# Patient Record
Sex: MALE | Race: WHITE | Employment: OTHER | URBAN - METROPOLITAN AREA
[De-identification: names, ages, dates, MRNs, and addresses within clinical notes are randomized per-mention and may not be internally consistent; named-entity substitution may affect disease eponyms.]

---

## 2017-10-31 PROBLEM — S72.415A: Status: ACTIVE | Noted: 2017-10-31

## 2017-10-31 PROBLEM — E87.1 HYPONATREMIA: Status: ACTIVE | Noted: 2017-10-31

## 2017-10-31 PROBLEM — E87.20 LACTIC ACIDOSIS: Status: ACTIVE | Noted: 2017-10-31

## 2017-10-31 PROBLEM — F10.10 ALCOHOL ABUSE: Status: ACTIVE | Noted: 2017-10-31

## 2017-11-01 PROBLEM — E44.0 MODERATE PROTEIN-CALORIE MALNUTRITION (HCC): Status: ACTIVE | Noted: 2017-11-01

## 2017-11-21 ENCOUNTER — OFFICE VISIT (OUTPATIENT)
Dept: ORTHOPEDIC SURGERY | Age: 67
End: 2017-11-21

## 2017-11-21 VITALS — BODY MASS INDEX: 24.39 KG/M2 | WEIGHT: 155.42 LBS | HEIGHT: 67 IN

## 2017-11-21 DIAGNOSIS — S72.415A: Primary | ICD-10-CM

## 2017-11-21 PROCEDURE — 1036F TOBACCO NON-USER: CPT | Performed by: ORTHOPAEDIC SURGERY

## 2017-11-21 PROCEDURE — 4040F PNEUMOC VAC/ADMIN/RCVD: CPT | Performed by: ORTHOPAEDIC SURGERY

## 2017-11-21 PROCEDURE — 1123F ACP DISCUSS/DSCN MKR DOCD: CPT | Performed by: ORTHOPAEDIC SURGERY

## 2017-11-21 PROCEDURE — G8420 CALC BMI NORM PARAMETERS: HCPCS | Performed by: ORTHOPAEDIC SURGERY

## 2017-11-21 PROCEDURE — G8484 FLU IMMUNIZE NO ADMIN: HCPCS | Performed by: ORTHOPAEDIC SURGERY

## 2017-11-21 PROCEDURE — G8427 DOCREV CUR MEDS BY ELIG CLIN: HCPCS | Performed by: ORTHOPAEDIC SURGERY

## 2017-11-21 PROCEDURE — 27516 TREAT THIGH FX GROWTH PLATE: CPT | Performed by: ORTHOPAEDIC SURGERY

## 2017-11-21 PROCEDURE — 1111F DSCHRG MED/CURRENT MED MERGE: CPT | Performed by: ORTHOPAEDIC SURGERY

## 2017-11-21 PROCEDURE — 3017F COLORECTAL CA SCREEN DOC REV: CPT | Performed by: ORTHOPAEDIC SURGERY

## 2017-11-21 PROCEDURE — 99203 OFFICE O/P NEW LOW 30 MIN: CPT | Performed by: ORTHOPAEDIC SURGERY

## 2017-11-21 NOTE — PROGRESS NOTES
left knee at that point. Consideration for full weightbearing and advancing activities then. All questions and concerns were addressed today. Patient is in agreement with the plan.         Nohemi Tsai MD  Hand & Upper Extremity Surgery  [address] [facility] [place] [facility] ([facility])

## 2017-12-12 ENCOUNTER — OFFICE VISIT (OUTPATIENT)
Dept: ORTHOPEDIC SURGERY | Age: 67
End: 2017-12-12

## 2017-12-12 VITALS — WEIGHT: 155.42 LBS | BODY MASS INDEX: 24.39 KG/M2 | HEIGHT: 67 IN

## 2017-12-12 DIAGNOSIS — S72.415A: Primary | ICD-10-CM

## 2017-12-12 PROCEDURE — 99024 POSTOP FOLLOW-UP VISIT: CPT | Performed by: ORTHOPAEDIC SURGERY

## 2021-07-20 ENCOUNTER — APPOINTMENT (OUTPATIENT)
Dept: GENERAL RADIOLOGY | Age: 71
DRG: 305 | End: 2021-07-20
Payer: MEDICARE

## 2021-07-20 ENCOUNTER — APPOINTMENT (OUTPATIENT)
Dept: CT IMAGING | Age: 71
DRG: 305 | End: 2021-07-20
Payer: MEDICARE

## 2021-07-20 ENCOUNTER — HOSPITAL ENCOUNTER (INPATIENT)
Age: 71
LOS: 6 days | Discharge: HOME | DRG: 305 | End: 2021-07-27
Attending: INTERNAL MEDICINE | Admitting: INTERNAL MEDICINE
Payer: MEDICARE

## 2021-07-20 DIAGNOSIS — I10 HYPERTENSION, UNSPECIFIED TYPE: ICD-10-CM

## 2021-07-20 DIAGNOSIS — R10.84 GENERALIZED ABDOMINAL CRAMPING: ICD-10-CM

## 2021-07-20 DIAGNOSIS — R55 SYNCOPE AND COLLAPSE: Primary | ICD-10-CM

## 2021-07-20 DIAGNOSIS — R11.2 NON-INTRACTABLE VOMITING WITH NAUSEA, UNSPECIFIED VOMITING TYPE: ICD-10-CM

## 2021-07-20 DIAGNOSIS — Z78.9 ALCOHOL USE: ICD-10-CM

## 2021-07-20 LAB
A/G RATIO: 1.5 (ref 1.1–2.2)
ALBUMIN SERPL-MCNC: 4.8 G/DL (ref 3.4–5)
ALP BLD-CCNC: 108 U/L (ref 40–129)
ALT SERPL-CCNC: 21 U/L (ref 10–40)
AMPHETAMINE SCREEN, URINE: NORMAL
ANION GAP SERPL CALCULATED.3IONS-SCNC: 16 MMOL/L (ref 3–16)
AST SERPL-CCNC: 36 U/L (ref 15–37)
BARBITURATE SCREEN URINE: NORMAL
BASOPHILS ABSOLUTE: 0.1 K/UL (ref 0–0.2)
BASOPHILS RELATIVE PERCENT: 0.7 %
BENZODIAZEPINE SCREEN, URINE: NORMAL
BILIRUB SERPL-MCNC: 0.5 MG/DL (ref 0–1)
BILIRUBIN URINE: NEGATIVE
BLOOD, URINE: NEGATIVE
BUN BLDV-MCNC: 10 MG/DL (ref 7–20)
CALCIUM SERPL-MCNC: 10.1 MG/DL (ref 8.3–10.6)
CANNABINOID SCREEN URINE: NORMAL
CHLORIDE BLD-SCNC: 92 MMOL/L (ref 99–110)
CLARITY: CLEAR
CO2: 26 MMOL/L (ref 21–32)
COCAINE METABOLITE SCREEN URINE: NORMAL
COLOR: YELLOW
CREAT SERPL-MCNC: 0.7 MG/DL (ref 0.8–1.3)
EOSINOPHILS ABSOLUTE: 0 K/UL (ref 0–0.6)
EOSINOPHILS RELATIVE PERCENT: 0.2 %
ETHANOL: NORMAL MG/DL (ref 0–0.08)
GFR AFRICAN AMERICAN: >60
GFR NON-AFRICAN AMERICAN: >60
GLOBULIN: 3.2 G/DL
GLUCOSE BLD-MCNC: 118 MG/DL (ref 70–99)
GLUCOSE URINE: NEGATIVE MG/DL
HCT VFR BLD CALC: 46.2 % (ref 40.5–52.5)
HEMOGLOBIN: 16.2 G/DL (ref 13.5–17.5)
KETONES, URINE: 15 MG/DL
LEUKOCYTE ESTERASE, URINE: NEGATIVE
LIPASE: 23 U/L (ref 13–60)
LYMPHOCYTES ABSOLUTE: 0.7 K/UL (ref 1–5.1)
LYMPHOCYTES RELATIVE PERCENT: 6.9 %
Lab: NORMAL
MCH RBC QN AUTO: 33.1 PG (ref 26–34)
MCHC RBC AUTO-ENTMCNC: 35.2 G/DL (ref 31–36)
MCV RBC AUTO: 94.1 FL (ref 80–100)
METHADONE SCREEN, URINE: NORMAL
MICROSCOPIC EXAMINATION: ABNORMAL
MONOCYTES ABSOLUTE: 0.4 K/UL (ref 0–1.3)
MONOCYTES RELATIVE PERCENT: 4.1 %
NEUTROPHILS ABSOLUTE: 9.2 K/UL (ref 1.7–7.7)
NEUTROPHILS RELATIVE PERCENT: 88.1 %
NITRITE, URINE: NEGATIVE
OPIATE SCREEN URINE: NORMAL
OXYCODONE URINE: NORMAL
PDW BLD-RTO: 13.4 % (ref 12.4–15.4)
PH UA: 7.5
PH UA: 7.5 (ref 5–8)
PHENCYCLIDINE SCREEN URINE: NORMAL
PLATELET # BLD: 244 K/UL (ref 135–450)
PMV BLD AUTO: 6.6 FL (ref 5–10.5)
POTASSIUM SERPL-SCNC: 4.2 MMOL/L (ref 3.5–5.1)
PROPOXYPHENE SCREEN: NORMAL
PROTEIN UA: NEGATIVE MG/DL
RBC # BLD: 4.91 M/UL (ref 4.2–5.9)
SODIUM BLD-SCNC: 134 MMOL/L (ref 136–145)
SPECIFIC GRAVITY UA: 1.01 (ref 1–1.03)
TOTAL PROTEIN: 8 G/DL (ref 6.4–8.2)
TROPONIN: <0.01 NG/ML
URINE REFLEX TO CULTURE: ABNORMAL
URINE TYPE: ABNORMAL
UROBILINOGEN, URINE: 0.2 E.U./DL
WBC # BLD: 10.5 K/UL (ref 4–11)

## 2021-07-20 PROCEDURE — 96375 TX/PRO/DX INJ NEW DRUG ADDON: CPT

## 2021-07-20 PROCEDURE — 93005 ELECTROCARDIOGRAM TRACING: CPT | Performed by: INTERNAL MEDICINE

## 2021-07-20 PROCEDURE — 82077 ASSAY SPEC XCP UR&BREATH IA: CPT

## 2021-07-20 PROCEDURE — 83690 ASSAY OF LIPASE: CPT

## 2021-07-20 PROCEDURE — 85025 COMPLETE CBC W/AUTO DIFF WBC: CPT

## 2021-07-20 PROCEDURE — 84484 ASSAY OF TROPONIN QUANT: CPT

## 2021-07-20 PROCEDURE — 99285 EMERGENCY DEPT VISIT HI MDM: CPT

## 2021-07-20 PROCEDURE — 81003 URINALYSIS AUTO W/O SCOPE: CPT

## 2021-07-20 PROCEDURE — 74177 CT ABD & PELVIS W/CONTRAST: CPT

## 2021-07-20 PROCEDURE — 6360000004 HC RX CONTRAST MEDICATION: Performed by: NURSE PRACTITIONER

## 2021-07-20 PROCEDURE — 6360000002 HC RX W HCPCS: Performed by: NURSE PRACTITIONER

## 2021-07-20 PROCEDURE — 80307 DRUG TEST PRSMV CHEM ANLYZR: CPT

## 2021-07-20 PROCEDURE — 96374 THER/PROPH/DIAG INJ IV PUSH: CPT

## 2021-07-20 PROCEDURE — 80053 COMPREHEN METABOLIC PANEL: CPT

## 2021-07-20 PROCEDURE — 70450 CT HEAD/BRAIN W/O DYE: CPT

## 2021-07-20 PROCEDURE — 71046 X-RAY EXAM CHEST 2 VIEWS: CPT

## 2021-07-20 RX ORDER — HYDRALAZINE HYDROCHLORIDE 20 MG/ML
10 INJECTION INTRAMUSCULAR; INTRAVENOUS ONCE
Status: COMPLETED | OUTPATIENT
Start: 2021-07-20 | End: 2021-07-20

## 2021-07-20 RX ORDER — ONDANSETRON 2 MG/ML
4 INJECTION INTRAMUSCULAR; INTRAVENOUS ONCE
Status: COMPLETED | OUTPATIENT
Start: 2021-07-20 | End: 2021-07-20

## 2021-07-20 RX ADMIN — HYDRALAZINE HYDROCHLORIDE 10 MG: 20 INJECTION INTRAMUSCULAR; INTRAVENOUS at 21:16

## 2021-07-20 RX ADMIN — ONDANSETRON 4 MG: 2 INJECTION INTRAMUSCULAR; INTRAVENOUS at 23:24

## 2021-07-20 RX ADMIN — IOPAMIDOL 75 ML: 755 INJECTION, SOLUTION INTRAVENOUS at 23:40

## 2021-07-20 ASSESSMENT — PAIN DESCRIPTION - LOCATION: LOCATION: HEAD

## 2021-07-20 ASSESSMENT — PAIN DESCRIPTION - PAIN TYPE: TYPE: ACUTE PAIN

## 2021-07-20 ASSESSMENT — PAIN SCALES - GENERAL: PAINLEVEL_OUTOF10: 4

## 2021-07-20 NOTE — Clinical Note
Patient Class: Observation [104]   REQUIRED: Diagnosis: Headache [[id number]]   Estimated Length of Stay: Estimated stay of less than 2 midnights   Admitting Provider:  Laura Flowers [[id number]]   Telemetry/Cardiac Monitoring Required?: Yes

## 2021-07-20 NOTE — ED NOTES
Bed: 31  Expected date:   Expected time:   Means of arrival:   Comments:  Steve Jiang RN  07/20/21 7805

## 2021-07-21 ENCOUNTER — APPOINTMENT (OUTPATIENT)
Dept: GENERAL RADIOLOGY | Age: 71
DRG: 305 | End: 2021-07-21
Payer: MEDICARE

## 2021-07-21 PROBLEM — R51.9 HEADACHE: Status: ACTIVE | Noted: 2021-07-21

## 2021-07-21 PROBLEM — R11.2 INTRACTABLE NAUSEA AND VOMITING: Status: ACTIVE | Noted: 2021-07-21

## 2021-07-21 PROBLEM — R13.10 DYSPHAGIA: Status: ACTIVE | Noted: 2021-07-21

## 2021-07-21 PROBLEM — Z85.819 HISTORY OF ORAL CANCER: Status: ACTIVE | Noted: 2021-07-21

## 2021-07-21 LAB
ANION GAP SERPL CALCULATED.3IONS-SCNC: 18 MMOL/L (ref 3–16)
BASOPHILS ABSOLUTE: 0 K/UL (ref 0–0.2)
BASOPHILS RELATIVE PERCENT: 0.2 %
BUN BLDV-MCNC: 13 MG/DL (ref 7–20)
CALCIUM SERPL-MCNC: 10 MG/DL (ref 8.3–10.6)
CHLORIDE BLD-SCNC: 92 MMOL/L (ref 99–110)
CO2: 23 MMOL/L (ref 21–32)
CREAT SERPL-MCNC: 0.7 MG/DL (ref 0.8–1.3)
EKG ATRIAL RATE: 80 BPM
EKG DIAGNOSIS: NORMAL
EKG P AXIS: 15 DEGREES
EKG P-R INTERVAL: 140 MS
EKG Q-T INTERVAL: 448 MS
EKG QRS DURATION: 124 MS
EKG QTC CALCULATION (BAZETT): 516 MS
EKG R AXIS: -42 DEGREES
EKG T AXIS: 38 DEGREES
EKG VENTRICULAR RATE: 80 BPM
EOSINOPHILS ABSOLUTE: 0.1 K/UL (ref 0–0.6)
EOSINOPHILS RELATIVE PERCENT: 0.5 %
GFR AFRICAN AMERICAN: >60
GFR NON-AFRICAN AMERICAN: >60
GLUCOSE BLD-MCNC: 136 MG/DL (ref 70–99)
GLUCOSE BLD-MCNC: 142 MG/DL (ref 70–99)
GLUCOSE BLD-MCNC: 151 MG/DL (ref 70–99)
GLUCOSE BLD-MCNC: 151 MG/DL (ref 70–99)
GLUCOSE BLD-MCNC: 154 MG/DL (ref 70–99)
HCT VFR BLD CALC: 49.2 % (ref 40.5–52.5)
HEMOGLOBIN: 17.2 G/DL (ref 13.5–17.5)
LYMPHOCYTES ABSOLUTE: 0.7 K/UL (ref 1–5.1)
LYMPHOCYTES RELATIVE PERCENT: 4.2 %
MAGNESIUM: 2.3 MG/DL (ref 1.8–2.4)
MCH RBC QN AUTO: 32.4 PG (ref 26–34)
MCHC RBC AUTO-ENTMCNC: 34.9 G/DL (ref 31–36)
MCV RBC AUTO: 92.9 FL (ref 80–100)
MONOCYTES ABSOLUTE: 1 K/UL (ref 0–1.3)
MONOCYTES RELATIVE PERCENT: 6.1 %
NEUTROPHILS ABSOLUTE: 14.7 K/UL (ref 1.7–7.7)
NEUTROPHILS RELATIVE PERCENT: 89 %
PDW BLD-RTO: 13.7 % (ref 12.4–15.4)
PERFORMED ON: ABNORMAL
PLATELET # BLD: 253 K/UL (ref 135–450)
PMV BLD AUTO: 6.6 FL (ref 5–10.5)
POTASSIUM SERPL-SCNC: 3.5 MMOL/L (ref 3.5–5.1)
RBC # BLD: 5.3 M/UL (ref 4.2–5.9)
REASON FOR REJECTION: NORMAL
REJECTED TEST: NORMAL
SODIUM BLD-SCNC: 133 MMOL/L (ref 136–145)
WBC # BLD: 16.5 K/UL (ref 4–11)

## 2021-07-21 PROCEDURE — 85025 COMPLETE CBC W/AUTO DIFF WBC: CPT

## 2021-07-21 PROCEDURE — 99221 1ST HOSP IP/OBS SF/LOW 40: CPT | Performed by: INTERNAL MEDICINE

## 2021-07-21 PROCEDURE — 83735 ASSAY OF MAGNESIUM: CPT

## 2021-07-21 PROCEDURE — 80048 BASIC METABOLIC PNL TOTAL CA: CPT

## 2021-07-21 PROCEDURE — 74220 X-RAY XM ESOPHAGUS 1CNTRST: CPT

## 2021-07-21 PROCEDURE — C9113 INJ PANTOPRAZOLE SODIUM, VIA: HCPCS | Performed by: INTERNAL MEDICINE

## 2021-07-21 PROCEDURE — 92610 EVALUATE SWALLOWING FUNCTION: CPT

## 2021-07-21 PROCEDURE — 2580000003 HC RX 258: Performed by: INTERNAL MEDICINE

## 2021-07-21 PROCEDURE — 6360000002 HC RX W HCPCS: Performed by: INTERNAL MEDICINE

## 2021-07-21 PROCEDURE — 1200000000 HC SEMI PRIVATE

## 2021-07-21 PROCEDURE — 92526 ORAL FUNCTION THERAPY: CPT

## 2021-07-21 PROCEDURE — 93010 ELECTROCARDIOGRAM REPORT: CPT | Performed by: INTERNAL MEDICINE

## 2021-07-21 RX ORDER — PROMETHAZINE HYDROCHLORIDE 25 MG/ML
12.5 INJECTION, SOLUTION INTRAMUSCULAR; INTRAVENOUS EVERY 4 HOURS PRN
Status: DISCONTINUED | OUTPATIENT
Start: 2021-07-21 | End: 2021-07-27 | Stop reason: HOSPADM

## 2021-07-21 RX ORDER — ACETAMINOPHEN 650 MG/1
650 SUPPOSITORY RECTAL EVERY 6 HOURS PRN
Status: DISCONTINUED | OUTPATIENT
Start: 2021-07-21 | End: 2021-07-27 | Stop reason: HOSPADM

## 2021-07-21 RX ORDER — SODIUM CHLORIDE 9 MG/ML
25 INJECTION, SOLUTION INTRAVENOUS PRN
Status: DISCONTINUED | OUTPATIENT
Start: 2021-07-21 | End: 2021-07-27 | Stop reason: HOSPADM

## 2021-07-21 RX ORDER — POTASSIUM CHLORIDE 7.45 MG/ML
10 INJECTION INTRAVENOUS PRN
Status: DISCONTINUED | OUTPATIENT
Start: 2021-07-21 | End: 2021-07-23

## 2021-07-21 RX ORDER — ONDANSETRON 2 MG/ML
4 INJECTION INTRAMUSCULAR; INTRAVENOUS EVERY 4 HOURS PRN
Status: DISCONTINUED | OUTPATIENT
Start: 2021-07-21 | End: 2021-07-21

## 2021-07-21 RX ORDER — POTASSIUM CHLORIDE 20 MEQ/1
40 TABLET, EXTENDED RELEASE ORAL PRN
Status: DISCONTINUED | OUTPATIENT
Start: 2021-07-21 | End: 2021-07-23

## 2021-07-21 RX ORDER — ACETAMINOPHEN 325 MG/1
650 TABLET ORAL EVERY 6 HOURS PRN
Status: DISCONTINUED | OUTPATIENT
Start: 2021-07-21 | End: 2021-07-27 | Stop reason: HOSPADM

## 2021-07-21 RX ORDER — SODIUM CHLORIDE, SODIUM LACTATE, POTASSIUM CHLORIDE, CALCIUM CHLORIDE 600; 310; 30; 20 MG/100ML; MG/100ML; MG/100ML; MG/100ML
2000 INJECTION, SOLUTION INTRAVENOUS ONCE
Status: COMPLETED | OUTPATIENT
Start: 2021-07-21 | End: 2021-07-21

## 2021-07-21 RX ORDER — SODIUM CHLORIDE 9 MG/ML
INJECTION, SOLUTION INTRAVENOUS CONTINUOUS
Status: DISCONTINUED | OUTPATIENT
Start: 2021-07-21 | End: 2021-07-22

## 2021-07-21 RX ORDER — ONDANSETRON 4 MG/1
4 TABLET, ORALLY DISINTEGRATING ORAL EVERY 4 HOURS PRN
Status: DISCONTINUED | OUTPATIENT
Start: 2021-07-21 | End: 2021-07-21

## 2021-07-21 RX ORDER — SODIUM CHLORIDE 0.9 % (FLUSH) 0.9 %
10 SYRINGE (ML) INJECTION PRN
Status: DISCONTINUED | OUTPATIENT
Start: 2021-07-21 | End: 2021-07-27 | Stop reason: HOSPADM

## 2021-07-21 RX ORDER — PANTOPRAZOLE SODIUM 40 MG/10ML
40 INJECTION, POWDER, LYOPHILIZED, FOR SOLUTION INTRAVENOUS DAILY
Status: DISCONTINUED | OUTPATIENT
Start: 2021-07-21 | End: 2021-07-27 | Stop reason: HOSPADM

## 2021-07-21 RX ORDER — PROMETHAZINE HYDROCHLORIDE 25 MG/1
12.5 TABLET ORAL EVERY 4 HOURS PRN
Status: DISCONTINUED | OUTPATIENT
Start: 2021-07-21 | End: 2021-07-27 | Stop reason: HOSPADM

## 2021-07-21 RX ORDER — HYDRALAZINE HYDROCHLORIDE 20 MG/ML
10 INJECTION INTRAMUSCULAR; INTRAVENOUS EVERY 6 HOURS PRN
Status: DISCONTINUED | OUTPATIENT
Start: 2021-07-21 | End: 2021-07-27 | Stop reason: HOSPADM

## 2021-07-21 RX ORDER — MAGNESIUM SULFATE 1 G/100ML
1000 INJECTION INTRAVENOUS PRN
Status: DISCONTINUED | OUTPATIENT
Start: 2021-07-21 | End: 2021-07-27 | Stop reason: HOSPADM

## 2021-07-21 RX ADMIN — PANTOPRAZOLE SODIUM 40 MG: 40 INJECTION, POWDER, FOR SOLUTION INTRAVENOUS at 10:01

## 2021-07-21 RX ADMIN — HYDRALAZINE HYDROCHLORIDE 10 MG: 20 INJECTION INTRAMUSCULAR; INTRAVENOUS at 10:02

## 2021-07-21 RX ADMIN — PROMETHAZINE HYDROCHLORIDE 12.5 MG: 25 INJECTION INTRAMUSCULAR; INTRAVENOUS at 05:23

## 2021-07-21 RX ADMIN — ENOXAPARIN SODIUM 40 MG: 40 INJECTION SUBCUTANEOUS at 10:25

## 2021-07-21 RX ADMIN — SODIUM CHLORIDE, POTASSIUM CHLORIDE, SODIUM LACTATE AND CALCIUM CHLORIDE 2000 ML: 600; 310; 30; 20 INJECTION, SOLUTION INTRAVENOUS at 05:45

## 2021-07-21 RX ADMIN — SODIUM CHLORIDE: 9 INJECTION, SOLUTION INTRAVENOUS at 12:43

## 2021-07-21 ASSESSMENT — PAIN SCALES - GENERAL
PAINLEVEL_OUTOF10: 0
PAINLEVEL_OUTOF10: 0

## 2021-07-21 NOTE — PROGRESS NOTES
4 Eyes Skin Assessment     The patient is being assess for   Admission    I agree that 2 RN's have performed a thorough Head to Toe Skin Assessment on the patient. ALL assessment sites listed below have been assessed. Areas assessed for pressure by both nurses:   [x]   Head, Face, and Ears   [x]   Shoulders, Back, and Chest, Abdomen  [x]   Arms, Elbows, and Hands   [x]   Coccyx, Sacrum, and Ischium  [x]   Legs, Feet, and Heels        Skin Assessed Under all Medical Devices by both nurses:  none              All Mepilex Borders were peeled back and area peeked at by both nurses:  Yes  Please list where Mepilex Borders are located:  n/a    Mass noted to right side of lower leg. **SHARE this note so that the co-signing nurse is able to place an eSignature**    Co-signer eSignature: Electronically signed by Gera Tuttle RN on 7/21/21 at 3:15 PM EDT    Does the Patient have Skin Breakdown related to pressure?   No              Stanislav Prevention initiated:  Yes   Wound Care Orders initiated:  No      [facility] nurse consulted for Pressure Injury (Stage 3,4, Unstageable, DTI, NWPT, Complex wounds)and New or Established Ostomies:  NA      Primary Nurse eSignature: Electronically signed by Ayah Mariscal RN on 7/21/21 at 3:13 PM EDT

## 2021-07-21 NOTE — PROGRESS NOTES
Called GI consult @0749  Re: Dysphagia w/ intractable nausea and vomiting per   RN-Natali spoke to  @[phone]

## 2021-07-21 NOTE — PROGRESS NOTES
Patient admitted to room 361 from ED. Patient oriented to room, call light, bed rails, phone, lights and bathroom. Patient instructed about the schedule of the day including: vital sign frequency, lab draws, possible tests, frequency of MD and staff rounds, including RN/MD rounding together at bedside, daily weights, and I &O's. Patient instructed about prescribed diet, how to use 8MENU, and television. Bed alarm in place, patient aware of placement and reason. Telemetry box 82 in place, patient aware of placement and reason. Bed locked, in lowest position, side rails up 2/4, call light within reach. Will continue to monitor.

## 2021-07-21 NOTE — ED NOTES
Was called to Radiology due to patient having multiple episodes of emesis. Patient was medicated with Zofran. Will continue to monitor.      Annel Box RN  07/20/21 7305

## 2021-07-21 NOTE — PROGRESS NOTES
Speech Language Pathology  Facility/Department: [facility] - MED SURG   CLINICAL BEDSIDE SWALLOW EVALUATION      Recommended Diet and Intervention  Diet Solids Recommendation: NPO  Liquid Consistency Recommendation: NPO  Recommended Form of Meds: Via alternative means of nutrition  *Oral care 2-3x/day; poor dental / oral hygiene    NAME: Sammi Louise  : 1950  MRN: [id number]    ADMISSION DATE: 2021  ADMITTING DIAGNOSIS: has Nondisplaced unspecified condyle fracture of lower end of left femur, initial encounter for closed fracture ([facility] [address] Alcohol abuse; Lactic acidosis; Hyponatremia; Moderate protein-calorie malnutrition ([facility] [address] Headache; Intractable nausea and vomiting; History of oral cancer; and Dysphagia on their problem list.  ONSET DATE: Pt admitted 21    Recent Chest Xray (21): Impression   No radiographic evidence of acute pulmonary disease. Date of Eval: 2021  Evaluating Therapist: HERMANN Ashley    Current Diet level:  Current Diet : NPO  Current Liquid Diet : NPO      Primary Complaint  Patient Complaint: Per MD H&P, \"Jerome Rosa is a 70 y.o. male. He presents primarily complaining of left sided headache, nausea and vomiting. He is a somewhat difficult historian and does not volunteer much information unless specifically asked. For example, his sister visited the ER earlier and related that he has also had three syncopal episodes in the past week.     He has a h/o oral cancer. He underwent partial glossectomy and mandibulectomy about seven years ago in  followed by radiation therapy. He states he completed five years of regular follow up and monitoring thereafter. It does not seem he has gotten much medical care aside from that. He does not take any prescribed medications.     Headache  He describes a fairly persistent left temporal headache over the past 3 days. He denies any visual changes but has noticed some photophobia.   It is unusual for him to have a headache.     Nausea/Vomiting  Eventually he reports about one year's worth of dysphagia, solids > liquids. He relates it feels like food gets stuck in the middle of his chest\". Pain:  Pain Assessment  Pain Assessment: 0-10  Pain Level: 0  Pain Type: Acute pain  Pain Location: Head    Reason for Referral  Nena Jefferson was referred for a bedside swallow evaluation to assess the efficiency of his swallow function, identify signs and symptoms of aspiration and make recommendations regarding safe dietary consistencies, effective compensatory strategies, and safe eating environment. Impression  Dysphagia Diagnosis: Moderate to severe oral stage dysphagia; Suspected needs further assessment  Dysphagia Outcome Severity Scale: Level 1: Severe dysphagia- NPO. Unable to tolerate any PO safely   Pt seen upright in bed, alert and agreeable to evaluation, RN OK'd SLP entry and evaluation. Pt's sister at bedside. Noted poor oral / dental hygiene with barium residue noted along hard and soft palate, lingual surface (as well as brown-tinged secretions?). Unable to successfully clear majority of residue with moistened toothette / toothbrush. SLP requesting suctioning set-up by RN. Oral suctiona lso used to clear, as well as, swish/spit with hot water. After multiple trials of oral care and swish/spit, majority of lingual surface now mucosa-colored, although brown-tinged secretions remain largely on tip of tongue. Noted pt with intermittent throat clearing during completion of swish/spit, as well as, instances of dry retching. Pt and pt's sister reported that pt typically uses a straw to drink. Pt not appropriate for PO trials at this time d/t poor oral / dental hygiene, as well as, intermittent throat clears noted during completion of oral care alone. Per chart and RN, pt likely to have EGD completed tomorrow.   Recommend that pt continue to be NPO with oral care 2-3x/day pending ongoing assessment. RN notified of recs. ST to continue to follow. Vitals/labs:   Temp: n/a  SpO2: 96%  RR: 16-20/min  BP: 139/88  HR: 89    Treatment Plan  Requires SLP Intervention: Yes  Duration/Frequency of Treatment: 3-5x/week for LOS  D/C Recommendations: To be determined       Recommended Diet and Intervention  Diet Solids Recommendation: NPO  Liquid Consistency Recommendation: NPO  Recommended Form of Meds: Via alternative means of nutrition  Recommendations: Dysphagia treatment; Modified barium swallow study;NPO  Therapeutic Interventions: Diet tolerance monitoring;Oral care; Patient/Family education    Compensatory Swallowing Strategies  NPO, oral care 2-3x/day    Treatment/Goals  Short-term Goals  Timeframe for Short-term Goals: 5 days (7/26/21)  Long-term Goals  Timeframe for Long-term Goals: 7 days (7/28/21)  Goal 1: The pt will tolerate safest and least restrictive diet without clinical s/s of aspiration. Dysphagia Goals: The patient will tolerate recommended diet without observed clinical signs of aspiration; The patient/caregiver will demonstrate understanding of compensatory strategies for improved swallowing safety. ;The patient will tolerate instrumental swallowing procedure; The patient will tolerate repeat BSE when able. General  Chart Reviewed: Yes  Comments: Pt admitted d/t syncope and collapse, N/V. Pt has PMHx of head and neck cancer with hemiglossectomy and mandibulectomy in 2014 per chart (pt had chemo and radiation at that time). Behavior/Cognition: Alert; Cooperative  Respiratory Status: Room air  O2 Device: None (Room air)  Communication Observation: Dysarthria  Follows Directions: Simple  Dentition: Edentulous; Poor dental/oral hygeine  Patient Positioning: Upright in bed  Baseline Vocal Quality: Weak  Volitional Cough: Weak  Prior Dysphagia History: Pt had MBSS completed in November 2019 per chart with radiologist findings stating, \"[id number]  Consistencies Administered:  (Lightly moistened toothete / toothbrush, hot water for oral swish and spit)           Vision/Hearing  Vision  Vision: Within Functional Limits  Hearing  Hearing: Within functional limits    Oral Motor Deficits  Oral/Motor  Oral Motor: Exceptions to [facility] (Reduced buccal strength)  Labial ROM: Reduced left; Reduced right  Labial Strength: Reduced  Labial Coordination: Reduced  Lingual ROM: Reduced left; Reduced right  Lingual Strength: Reduced  Lingual Coordination: Reduced    Oral Phase Dysfunction  Oral Phase  Oral Phase: Exceptions     Indicators of Pharyngeal Phase Dysfunction   Pharyngeal Phase  Pharyngeal Phase: Exceptions    Prognosis  Prognosis  Prognosis for safe diet advancement: fair  Barriers to reach goals: other (comment);severity of dysphagia  Barriers/Prognosis Comment: s/p partial glossectomy and mandibulectomy  Individuals consulted  Consulted and agree with results and recommendations: Family member;Patient;RN  Family member consulted: pt's sister    Education  Patient Education:Pt educated on reason for referral, role of ST, assessment results and recommendations. Patient Education Response: Needs reinforcement;Verbalizes understanding  Safety Devices in place: Yes  Type of devices: Call light within reach;Nurse notified; Left in bed       Therapy Time  SLP Individual Minutes  Time In: 1500  Time Out: 0721  Minutes: 30; dysphagia eval and tx    Savana Mckenzie M.S. [facility]  Speech-language pathologist  [phone]

## 2021-07-21 NOTE — ED NOTES
Patient incontinent of urine and had an episode of emesis. Patient's linen and gown changed. Will continue to monitor.      Ana Yi RN  07/21/21 [id number]

## 2021-07-21 NOTE — ED PROVIDER NOTES
**ADVANCED PRACTICE PROVIDER, I HAVE EVALUATED THIS PATIENT**        [facility] B3 - MED SURG  EMERGENCY DEPARTMENT ENCOUNTER      Pt Name: López Mcarthur  OVK:[id number]  [place] 1950  Date of evaluation: 7/20/2021  Provider: ROSALBA Youngblood - CNP      Chief Complaint:    Chief Complaint   Patient presents with    Nausea     gave 4 zofran in route, started this morning     Fatigue     having trouble standing up    Hypertension    Headache         Nursing Notes, Past Medical Hx, Past Surgical Hx, Social Hx, Allergies, and Family Hx were all reviewed and agreed with or any disagreements were addressed in the HPI.    HPI: (Location, Duration, Timing, Severity, Quality, Assoc Sx, Context, Modifying factors)    Chief Complaint of sided headache associated with nausea and vomiting since this morning. This is a  70 y.o. male who presents who has felt weak, not feeling good and left sided HA. States that he has been having nauseated and vomited this morning, but denies any abdominal pain. He states that his blood pressure is elevated which is new. 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 He is not on any prescribed medicines at home. However, by the time the sister arrives, she informs the patient has passed out 3 times over the past week. Patient states that he has had some intermittent lightheadedness or dizziness but is not having any the symptoms on exam.  He does report feeling very nauseous and has vomited several times this morning along with a headache on the left side of his head. He has elevated blood pressure upon ED arrival however, he does have a history of hypertension is not on any medicines for this. He is mostly complaining of left-sided headache, rates the pain a 4 out of 10. He has not taken any medicine for his symptoms. He denies any cough, congestion, fever or chills. No chest pain pleuritic chest pain or shortness of breath.   He denies any urinary symptoms. He does report simply just feeling weak and under the weather over the past week. He denies any additional complaints, no additional aggravating relieving factors. Patient presents awake, alert and in no acute respiratory stress or toxic appearance. PastMedical/Surgical History:      Diagnosis Date    Cancer ([facility] [address]     jaw    Radiation     S/P chemotherapy, time since greater than 12 weeks          Procedure Laterality Date    EYE SURGERY Right 12/26/13    cataract    OTHER SURGICAL HISTORY      jaw cancer       Medications:  Current Discharge Medication List      CONTINUE these medications which have NOT CHANGED    Details   dextromethorphan-guaiFENesin (MUCINEX DM)  MG per extended release tablet Take 1 tablet by mouth every 12 hours as needed               Review of Systems:  (2-9 systems needed)  Review of Systems   Constitutional: Negative for chills and fever. He denies any fever or chills, generally just feels weak. HENT: Negative for congestion. Patient has had resection of his jaw secondary to cancer. Eyes: Negative for photophobia and visual disturbance. He denies any visual disturbances, no blurred or lost vision. No photo or phonophobia. Respiratory: Negative for cough, shortness of breath and wheezing. Cardiovascular: Negative for chest pain. Gastrointestinal: Negative for abdominal pain, diarrhea, nausea and vomiting. He has been nauseated but denies abdominal pain, has had a couple episodes of vomiting this morning. He denies any diarrhea or blood in stool. Genitourinary: Negative for difficulty urinating, dysuria, frequency and hematuria. Musculoskeletal: Negative for back pain. Skin: Negative for color change. Neurological: Positive for weakness and headaches. Negative for numbness.         Patient complains of feeling weak and not feeling good for the past couple of days, also complains of a left-sided headache specifically in his temple region. Denies worst headache of life. \"Positives and Pertinent negatives as per HPI\"    Physical Exam:  Physical Exam  Vitals and nursing note reviewed. Constitutional:       Appearance: He is well-developed. He is not diaphoretic. HENT:      Head: Normocephalic. Right Ear: External ear normal.      Left Ear: External ear normal.   Eyes:      General: No scleral icterus. Right eye: No discharge. Left eye: No discharge. Cardiovascular:      Rate and Rhythm: Normal rate. Comments: Normal S1 and 2, peripheral pulses 2+, no edema observed. Pulmonary:      Effort: Pulmonary effort is normal. No respiratory distress. Comments: Lungs are clear anteriorly, diminished posteriorly in the bases, patient is not tachypneic or dyspneic, saturations are 96% on room air. Abdominal:      Tenderness: There is no abdominal tenderness. Comments: Abdomen is soft and nondistended, bowel sounds are positive but appears to be more hypoactive, patient has no acute tenderness guarding or rebound tenderness. No ascites or rigidity. Musculoskeletal:         General: Normal range of motion. Cervical back: Normal range of motion and neck supple. Skin:     General: Skin is warm. Coloration: Skin is not pale. Neurological:      General: No focal deficit present. Mental Status: He is alert and oriented to person, place, and time. GCS: GCS eye subscore is 4. GCS verbal subscore is 5. GCS motor subscore is 6. Cranial Nerves: Cranial nerves are intact. Sensory: Sensation is intact. Motor: Motor function is intact. Comments: Patient is awake, alert following commands correctly, neurologically intact no focal deficits. No numbness tingling or paresthesias. No facial asymmetry. Tongue is midline. Patient follows all commands correctly. Neurologically intact with no focal deficits.    Psychiatric:         Behavior: Behavior normal. MEDICAL DECISION MAKING    Vitals:    Vitals:    07/23/21 0000 07/23/21 0309 07/23/21 0545 07/23/21 0800   BP: (!) 139/50  125/87 (!) 128/93   Pulse: 72  80 85   Resp: 18  17 18   Temp: 97.6 °F (36.4 °C)  97.4 °F (36.3 °C) 97.4 °F (36.3 °C)   TempSrc: Axillary  Axillary Axillary   SpO2: 97%  96%    Weight:  159 lb 14.4 oz (72.5 kg)     Height:           LABS:  Labs Reviewed   CBC WITH AUTO DIFFERENTIAL - Abnormal; Notable for the following components:       Result Value    Neutrophils Absolute 9.2 (*)     Lymphocytes Absolute 0.7 (*)     All other components within normal limits    Narrative:     Performed at:  [place] [address] [employer]   Phone [phone]   URINE RT REFLEX TO CULTURE - Abnormal; Notable for the following components:    Ketones, Urine 15 (*)     All other components within normal limits    Narrative:     Performed at:  [place] [address] [employer]   Phone [phone]   COMPREHENSIVE METABOLIC PANEL - Abnormal; Notable for the following components:    Sodium 134 (*)     Chloride 92 (*)     Glucose 118 (*)     CREATININE 0.7 (*)     All other components within normal limits    Narrative:     Performed at:  [place] [address] [employer]   Phone [phone]   CBC WITH AUTO DIFFERENTIAL - Abnormal; Notable for the following components:    WBC 16.5 (*)     Neutrophils Absolute 14.7 (*)     Lymphocytes Absolute 0.7 (*)     All other components within normal limits    Narrative:     Farzaneh Martinez tel. [phone],  Rejected Test Name/Called to: RHEA Friedman, 07/21/2021 08:43, by Reilly Brownlee  Performed at:  [address], [facility] [employer]   Phone [phone]   BASIC METABOLIC PANEL - Abnormal; Notable for the following components:    Sodium 133 (*)     Chloride 92 (*) Anion Gap 18 (*)     Glucose 151 (*)     CREATININE 0.7 (*)     All other components within normal limits    Narrative:     Performed at:  [address],  [place], [address]   Phone [phone] PANEL - Abnormal; Notable for the following components:    Potassium 2.8 (*)     Chloride 95 (*)     Glucose 119 (*)     CREATININE 0.6 (*)     All other components within normal limits    Narrative:     CALL  Ngo  [facility] Davy Arvizu [phone],  Chemistry results called to and read back by Van Peters RN, 07/22/2021  09:39, by [facility]  Performed at:  [address],  [place], [address]   Phone [phone]   CBC WITH AUTO DIFFERENTIAL - Abnormal; Notable for the following components:    WBC 13.9 (*)     Neutrophils Absolute 11.9 (*)     All other components within normal limits    Narrative:     Performed at:  [address],  [place], [address]   Phone [phone]   TSH WITH REFLEX TO FT4 - Abnormal; Notable for the following components:    TSH Reflex FT4 4.61 (*)     All other components within normal limits    Narrative:     Performed at:  [address] [address]   Phone [phone]   BASIC METABOLIC PANEL - Abnormal; Notable for the following components:    Sodium 132 (*)     Potassium 2.7 (*)     Chloride 95 (*)     Glucose 130 (*)     CREATININE <0.5 (*)     All other components within normal limits    Narrative:     Jarrell Sage [address]. [phone],  Chemistry results called to and read back by RHEA Patel, 07/23/2021  07:28, by Ayah Garces  Performed at:  [address],  [place], [address] [place]   Phone [phone]   CBC WITH AUTO DIFFERENTIAL - Abnormal; Notable for the following components:    Neutrophils Absolute 8.5 (*)     All other components within normal limits    Narrative:     Performed at:  [address], [address] [employer]   Phone [phone]   BASIC METABOLIC PANEL - Abnormal; Notable for the following components:    Sodium 132 (*)     Potassium 2.7 (*)     Chloride 94 (*)     Glucose 123 (*)     CREATININE <0.5 (*)     All other components within normal limits    Narrative:     Bharti VILLALBA tel. [phone],  Chemistry results called to and read back by Sammi Becker RN, 07/23/2021  00:53, by [facility]  Performed at:  [facility]  [address] [facility],  [place], [address] [employer]   Phone [phone]   POCT GLUCOSE - Abnormal; Notable for the following components:    POC Glucose 151 (*)     All other components within normal limits    Narrative:     Performed at:  [address], [address] [employer]   Phone [phone]   POCT GLUCOSE - Abnormal; Notable for the following components:    POC Glucose 154 (*)     All other components within normal limits    Narrative:     Performed at:  [address], [address] [employer]   Phone [phone]   POCT GLUCOSE - Abnormal; Notable for the following components:    POC Glucose 142 (*)     All other components within normal limits    Narrative:     Performed at:  [facility]) [address],  [place], [address] [employer]   Phone [phone]   POCT GLUCOSE - Abnormal; Notable for the following components:    POC Glucose 136 (*)     All other components within normal limits    Narrative:     Performed at:  [address],  [place], [address] [employer]   Phone [phone]   POCT GLUCOSE - Abnormal; Notable for the following components:    POC Glucose 109 (*)     All other components within normal limits    Narrative:     Performed at:  [facility]) - [address], [address] [employer]   Phone [phone]   POCT GLUCOSE - Abnormal; Notable for the following components:    POC Glucose 109 (*)     All other components within normal limits    Narrative:     Performed at:  [address], [facility] [employer]   Phone [phone]   POCT GLUCOSE - Abnormal; Notable for the following components:    POC Glucose 104 (*)     All other components within normal limits    Narrative:     Performed at:  [address], [facility] [employer]   Phone [phone]   POCT GLUCOSE - Abnormal; Notable for the following components:    POC Glucose 106 (*)     All other components within normal limits    Narrative:     Performed at:  [address], [facility] [employer]   Phone [phone]   POCT GLUCOSE - Abnormal; Notable for the following components:    POC Glucose 104 (*)     All other components within normal limits    Narrative:     Performed at:  [address], [facility] [employer]   Phone [phone]   POCT GLUCOSE - Abnormal; Notable for the following components:    POC Glucose 114 (*)     All other components within normal limits    Narrative:     Performed at:  [address], [facility] [employer]   Phone [phone]   LIPASE    Narrative:     Performed at:  [address], [facility] [employer]   Phone [phone]   TROPONIN    Narrative:     Performed at:  [address], [facility] [employer]   Phone [phone]   ETHANOL    Narrative:     Performed at:  [address], [facility] [employer]   Phone [phone]   URINE DRUG SCREEN    Narrative: Performed at:  [address], [facility] [employer]   Phone [phone]    Narrative:     Jeannie Feliz tel. [phone],  Rejected Test Name/Called to: RHEA Brownlee, 07/21/2021 08:43, by Devyn Gilmore  Performed at:  [address], [facility] [employer]   Phone [phone]   MAGNESIUM    Narrative:     Performed at:  [address], [facility] [employer]   Phone [phone]   MAGNESIUM    Narrative:     [facility] tel. [phone],  Chemistry results called to and read back by Mayda Go RN, 07/22/2021  09:39, by [facility]  Performed at:  [address], [facility] [employer]   Phone [phone]   CORTISOL TOTAL    Narrative:     Performed at:  [facility]  [address] [facility] [employer] [address]   Phone [phone]   URINE DRUG SCREEN    Narrative:     Performed at:  [address], [facility] [employer]   Phone [phone]   MAGNESIUM    Narrative:     Shayna Jonathan tel. [phone],  Chemistry results called to and read back by RHEA Demarco, 07/23/2021  07:28, by Devyn Gilmore  Performed at:  [address], [facility] [employer]   Phone [phone]   T4, FREE    Narrative:     Performed at:  [facility]  [address] [facility] [employer] [address]   Phone [phone]   ALDOSTERONE   METANEPHRINES PLASMA FREE   RENIN   POCT GLUCOSE   POCT GLUCOSE   POCT GLUCOSE   POCT GLUCOSE   POCT GLUCOSE   POCT GLUCOSE   POCT GLUCOSE   POCT GLUCOSE   POCT GLUCOSE   POCT GLUCOSE   POCT GLUCOSE   POCT GLUCOSE   POCT GLUCOSE   POCT GLUCOSE   POCT GLUCOSE        Remainder of labs reviewed and were negative at this time or not returned at the time of this note. RADIOLOGY:   Non-plain film images such as CT, Ultrasound and MRI are read by the radiologist. Mira BROWN, APRN - CNP have directly visualized the radiologic plain film image(s) with the below findings:      Interpretation per the Radiologist below, if available at the time of this note:    MRI BRAIN WO CONTRAST   Final Result   1. No acute intracranial abnormality. No acute infarct. 2. There is global parenchymal volume loss with mild chronic microvascular   ischemic changes. 3.  The ventricular dilatation is out of proportion to the cortical sulci. A   component of normal pressure hydrocephalus cannot be excluded. FL ESOPHAGRAM   Final Result   1. Suboptimal study due to patient's limited ability to cooperate. 2. Esophageal dysmotility with disordered primary peristalsis. 3. Findings suggestive of smooth luminal narrowing of the distal esophagus in   the region of the GE junction. Finding could be on the basis of reflux   esophagitis. Other etiologies cannot be excluded based on this examination. CT ABDOMEN PELVIS W IV CONTRAST Additional Contrast? None   Final Result   1. Wall thickening of the visualized esophagus. Infection versus   inflammation. Clinical follow-up recommended. 2. No appendicitis, diverticulitis, or small bowel obstruction. 3. Other findings as described. CT Head WO Contrast   Final Result   Age-related involutional changes with no acute intracranial abnormality. XR CHEST (2 VW)   Final Result   No radiographic evidence of acute pulmonary disease.          VL Renal Arterial Duplex Complete    (Results Pending)        MEDICAL DECISION MAKING / ED COURSE:    Because of high probability of sudden clinical deterioration of the patient's condition and risk of further deterioration, critical care time required my full attention to the patient's condition; which included chart data review, documentation, medication ordering, reviewing the patient's old records, reevaluation patient's cardiac, pulmonary and neurological status. Reevaluation of vital signs. Consultations with ED attending and admitting physician. Ordering, interpreting reviewing diagnostic testing. Therefore a critical care time was 35 minutes of direct attention to the patient's condition did not include time spent on procedures.     PROCEDURES:   Procedures    None    Patient was given:  Medications   promethazine (PHENERGAN) injection 12.5 mg (12.5 mg Intravenous Given 7/21/21 0523)   promethazine (PHENERGAN) tablet 12.5 mg (has no administration in time range)   pantoprazole (PROTONIX) injection 40 mg (40 mg Intravenous Given 7/22/21 0846)   sodium chloride flush 0.9 % injection 10 mL (10 mLs Intravenous Given 7/22/21 1035)   0.9 % sodium chloride infusion (has no administration in time range)   potassium chloride (KLOR-CON M) extended release tablet 40 mEq ( Oral See Alternative 7/23/21 0752)     Or   potassium bicarb-citric acid (EFFER-K) effervescent tablet 40 mEq ( Oral See Alternative 7/23/21 0752)     Or   potassium chloride 10 mEq/100 mL IVPB (Peripheral Line) (10 mEq Intravenous New Bag 7/23/21 0752)   magnesium sulfate 1000 mg in dextrose 5% 100 mL IVPB (has no administration in time range)   acetaminophen (TYLENOL) tablet 650 mg (has no administration in time range)     Or   acetaminophen (TYLENOL) suppository 650 mg (has no administration in time range)   enoxaparin (LOVENOX) injection 40 mg (40 mg Subcutaneous Given 7/22/21 0846)   hydrALAZINE (APRESOLINE) injection 10 mg (10 mg Intravenous Given 7/22/21 0846)   butalbital-acetaminophen-caffeine (FIORICET, ESGIC) per tablet 1 tablet (has no administration in time range)   metoprolol (LOPRESSOR) injection 5 mg (5 mg Intravenous Given 7/22/21 1030)   cloNIDine (CATAPRES) 0.1 MG/24HR 1 patch (1 patch Transdermal Patch Applied 7/22/21 5844)   dextrose 5 % and 0.45 % sodium chloride infusion ( Intravenous New Bag 7/22/21 1505)   hydrALAZINE (APRESOLINE) injection 10 mg (10 mg Intravenous Given 7/20/21 2116)   ondansetron (ZOFRAN) injection 4 mg (4 mg Intravenous Given 7/20/21 2324)   iopamidol (ISOVUE-370) 76 % injection 75 mL (75 mLs Intravenous Given 7/20/21 2340)   lactated ringers infusion 2,000 mL (0 mLs Intravenous Stopped 7/21/21 1004)   potassium chloride 10 mEq/100 mL IVPB (Peripheral Line) (10 mEq Intravenous New Bag 7/22/21 2028)       Patient complains that he has felt weak, not feeling good and left sided HA. States that he has been having nauseated and vomited this morning, but denies any abdominal pain. He states that his blood pressure is elevated which is new. 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 He is not on any prescribed medicines at home. However, by the time the sister arrives, she informs the patient has passed out 3 times over the past week. Patient states that he has had some intermittent lightheadedness or dizziness but is not having any the symptoms on exam.    After evaluation and examination patient he denies worse headache of life, denies thunderclap presentation. He actually has an unremarkable neurological and physiological exam.  He states he just does not feel well, feels weak and has fallen a couple times. However he denies hitting his head or having loss of conscious. After examination IV access, blood work, chest x-ray, EKG, CT scan of the head were ordered. EKG showed a sinus rhythm rate of 80 bpm, no acute ST changes, please see Dr. Clark [place] EKG interpretation note. CBC shows no acute sepsis or anemia. Metabolic panel shows no electrolyte disturbances or renal insufficiency. Liver functions are normal.  EtOH is negative. Troponin is negative. CT head shows age-related involutional changes no acute intracranial abnormality.   Urinalysis shows no infection however there is ketones, I was leery to give the patient fluids because of his high blood pressure. When I went back to see the patient he was having elevation in his blood pressure still, who sister was no the bedside, she states because she is concerned that the patient keeps having vomiting. Patient evidently has vomited 3 times however did not tell myself or the nursing staff that he has vomited since he has been here. I ordered or more antiemetics. He states he is having some abdominal cramping, I given medicine for his blood pressure is not on any medicine at home. I then spoke with the sister about doing a CT scan of the abdomen, patient agrees and said his sister both. However, due to the patient actually actively vomiting without notifying the staff, developing pain while here in the ER, having an elevated white count of unknown etiology, the elevation in blood pressure I spoke with him about being admitted to the hospital, they both agree with this plan. I then informed both the patient and daughter that I would do a CT scan the abdomen, give him medicine for his blood pressure but more importantly that if he needed something he needs to notify the nursing staff, he does not seem to be want to complain. The sister states that patient has passed out earlier this week and lives alone, patient did not tell me any of this upon his ED arrival.  It was then agreed that he would be admitted. I did page Dr. Galloway Do the hospitalist on-call via [employer] to inform him of this need of admission. I informed him that the CT scan the abdomen was still pending, he agreed to accept the patient for admission. However, due to the end of my shift I gave face-to-face report to Dr. Carlson Ast the nighttime ED attending. However, the plan will be the patient be admitted. I instructed nursing staff to monitor his blood pressure more closely. The patient tolerated their visit well. I evaluated the patient. The physician was available for consultation as needed.   The patient and / or the family were informed of the results of any tests, a time was given to answer questions, a plan was proposed and they agreed with plan. CLINICAL IMPRESSION:  1. Syncope and collapse    2. Hypertension, unspecified type    3. Alcohol use    4. Non-intractable vomiting with nausea, unspecified vomiting type    5. Generalized abdominal cramping        DISPOSITION Admitted 07/21/2021 05:18:59 AM      PATIENT REFERRED TO:  No follow-up provider specified.     DISCHARGE MEDICATIONS:  Current Discharge Medication List          DISCONTINUED MEDICATIONS:  Current Discharge Medication List      STOP taking these medications       HYDROcodone-acetaminophen ([address]) 5-325 MG per tablet Comments:   Reason for Stopping:         docusate sodium (COLACE) 100 MG capsule Comments:   Reason for Stopping:                      (Please note the MDM and HPI sections of this note were completed with a voice recognition program.  Efforts were made to edit the dictations but occasionally words are mis-transcribed.)    Electronically signed, ROSALBA Yusuf CNP,          ROSALBA Yusuf CNP  07/23/21 0823       ROSALBA Yusuf CNP  07/23/21 0820 Increased nutrient needs (specify)

## 2021-07-21 NOTE — PROGRESS NOTES
Hospitalist Progress Note      PCP: No primary care provider on file. Date of Admission: 7/20/2021    Chief Complaint:   N/V/Headache    Hospital Course:   Yue Aguillon is a 70 y.o. male with a history of head and neck cancer-apparently in remission. Has had jaw surgery. He was admitted for intractable headache as well as dysphagia to solids more than liquids. He has a history of partial glossectomy and mandibulectomy about seven years ago in 2014 followed by radiation therapy. He states he completed five years of regular follow up and monitoring thereafter. It does not seem he has gotten much medical care aside from that. He does not take any prescribed medications. His headache is mostly in the temporal region on the left side more so in the last 3 days. He denies any visual symptoms but has photophobia. He has associated nausea and vomiting. Has had dysphagia to solids as well but needs work-up. He had an esophagogram this morning suggestive of esophagitis with impaired peristalsis. Subjective:   Still has a headache  Nausea has improved  Dysphagia persists      Medications:  Reviewed    Infusion Medications    lactated ringers 2,000 mL (07/21/21 0545)    sodium chloride       Scheduled Medications    pantoprazole  40 mg Intravenous Daily    enoxaparin  40 mg Subcutaneous Daily     PRN Meds: promethazine, promethazine, sodium chloride flush, sodium chloride, potassium chloride **OR** potassium alternative oral replacement **OR** potassium chloride, magnesium sulfate, acetaminophen **OR** acetaminophen      Intake/Output Summary (Last 24 hours) at 7/21/2021 0758  Last data filed at 7/21/2021 0233  Gross per 24 hour   Intake --   Output 300 ml   Net -300 ml       Physical Exam Performed:    BP (!) 144/105   Pulse 114   Temp 98.2 °F (36.8 °C) (Oral)   Resp 20   SpO2 96%     General appearance: No apparent distress, appears stated age and cooperative.   HEENT: Pupils equal, round, and reactive to light. Conjunctivae/corneas clear. Neck: Supple, with full range of motion. No jugular venous distention. Trachea midline. Respiratory:  Normal respiratory effort. Clear to auscultation, bilaterally without Rales/Wheezes/Rhonchi. Cardiovascular: Regular rate and rhythm with normal S1/S2 without murmurs, rubs or gallops. Abdomen: Soft, non-tender, non-distended with normal bowel sounds. Musculoskeletal: No clubbing, cyanosis or edema bilaterally. Full range of motion without deformity. Skin: Skin color, texture, turgor normal.  No rashes or lesions. Neurologic:  Neurovascularly intact without any focal sensory/motor deficits. Cranial nerves: II-XII intact, grossly non-focal.  Psychiatric: Alert and oriented, thought content appropriate, normal insight  Capillary Refill: Brisk,3 seconds, normal   Peripheral Pulses: +2 palpable, equal bilaterally       Labs:   Recent Labs     07/20/21  1705   WBC 10.5   HGB 16.2   HCT 46.2        Recent Labs     07/20/21  1705   *   K 4.2   CL 92*   CO2 26   BUN 10   CREATININE 0.7*   CALCIUM 10.1     Recent Labs     07/20/21  1705   AST 36   ALT 21   BILITOT 0.5   ALKPHOS 108     No results for input(s): INR in the last 72 hours. Recent Labs     07/20/21  1705   TROPONINI <0.01       Urinalysis:      Lab Results   Component Value Date    NITRU Negative 07/20/2021    BLOODU Negative 07/20/2021    SPECGRAV 1.015 07/20/2021    GLUCOSEU Negative 07/20/2021    GLUCOSEU Neg 03/25/2010       Radiology:  CT ABDOMEN PELVIS W IV CONTRAST Additional Contrast? None   Final Result   1. Wall thickening of the visualized esophagus. Infection versus   inflammation. Clinical follow-up recommended. 2. No appendicitis, diverticulitis, or small bowel obstruction. 3. Other findings as described. CT Head WO Contrast   Final Result   Age-related involutional changes with no acute intracranial abnormality.          XR CHEST (2 VW)   Final Result   No radiographic evidence of acute pulmonary disease. MRI BRAIN WO CONTRAST    (Results Pending)           Assessment/Plan:    ASSESSMENT & PLAN  Dysphagia to solids more than liquids associated with Nausea/Vomiting. Esophagogram suggestive of reflux esophagitis/poor peristalsis. - Continue fluid resuscitation   -We will need GI consult given the results of the esophagogram   -Continue IV protonix. -Use antiemetics prn.  -  Will request input from the GI service. It seems likely the patient has reflex esophagitis and possibly a peptic stricture.     Headache-bothersome. Unknown etiology yet.   -Await MRI brain w/o contrast primarily in hopes of excluding an intracranial mass lesion.     DVT prophylaxis:        SCDs, lovenox  Code Status:               Full  Disposition:                 Inpatient    PT/OT Eval Status: Lovenox    Dispo -keep as an inpatient    Connie Hanks MD

## 2021-07-21 NOTE — H&P
Hospital Medicine History & Physical      Patient:  Ragini Hearn  :   1950  MRN:   [id number]  Date of Service: 21    Chief Complaint   Patient presents with    Nausea     gave 4 zofran in route, started this morning     Fatigue     having trouble standing up    Hypertension    Headache       HISTORY OF PRESENT ILLNESS:    Ragini Hearn is a 70 y.o. male. He presents primarily complaining of left sided headache, nausea and vomiting. He is a somewhat difficult historian and does not volunteer much information unless specifically asked. For example, his sister visited the ER earlier and related that he has also had three syncopal episodes in the past week. He has a h/o oral cancer. He underwent partial glossectomy and mandibulectomy about seven years ago in  followed by radiation therapy. He states he completed five years of regular follow up and monitoring thereafter. It does not seem he has gotten much medical care aside from that. He does not take any prescribed medications. Headache  He describes a fairly persistent left temporal headache over the past 3 days. He denies any visual changes but has noticed some photophobia. It is unusual for him to have a headache. Nausea/Vomiting  Eventually he reports about one year's worth of dysphagia, solids > liquids. He relates it feels like food gets stuck in the middle of his chest.    Review of Systems:  All pertinent positives and negatives are as noted in the HPI section. All other systems were reviewed and are negative. Past Medical History:   Diagnosis Date    Cancer [facility])     jaw    Radiation     S/P chemotherapy, time since greater than 12 weeks        Past Surgical History:   Procedure Laterality Date    EYE SURGERY Right 13    cataract    OTHER SURGICAL HISTORY      jaw cancer         Prior to Admission medications    Medication Sig Start Date End Date Taking?  Authorizing Provider dextromethorphan-guaiFENesin (MUCINEX DM)  MG per extended release tablet Take 1 tablet by mouth every 12 hours as needed   Yes Historical Provider, MD       Allergies:   Morphine    Social:   reports that he quit smoking about 8 years ago. He quit after 40.00 years of use. He has never used smokeless tobacco.   reports current alcohol use. Social History     Substance and Sexual Activity   Drug Use No       History reviewed. No pertinent family history. PHYSICAL EXAM:  I performed this physical examination. Vitals:  Patient Vitals for the past 24 hrs:   BP Temp Temp src Pulse Resp SpO2   07/21/21 0232 (!) 127/105 97.9 °F (36.6 °C) Oral 120 25 97 %   07/20/21 2116 (!) 172/103 -- -- 87 -- 99 %   07/20/21 1929 (!) 188/102 -- -- 85 20 96 %   07/20/21 1806 -- 97.8 °F (36.6 °C) Tympanic -- -- --   07/20/21 1754 (!) 180/109 -- -- 82 18 96 %   07/20/21 1738 (!) 193/115 -- -- 86 24 96 %   07/20/21 1655 (!) 205/130 -- -- 98 18 99 %       Intake/Output Summary (Last 24 hours) at 7/21/2021 0454  Last data filed at 7/21/2021 1889  Gross per 24 hour   Intake --   Output 300 ml   Net -300 ml       GEN:  Appearance:  Age appropriate male in NAD but did vomit during interview . Level of Consciousness:  alert . Orientation:  full    HEENT: Sclera anicteric. No conjunctival chemosis. Moist mucus membranes. No specific or diagnostic oral lesions. Patient is s/p left partial glossectomy and left hemimandibulectomy. Point tenderness noted over the left TMJ and masseter. No edema, erythema, or tenderness over the course of the left temporal artery and branches. NECK:  No signs of meningismus. Jugular veins not distended. Carotid pulses  2+.  no cervical lymphadenopathy. no thyromegaly. CV:  regular rhythm. normal S1 & S2.    no murmur. no rub.  no gallop. PULM:  Chest excursion is symmetric. Breath sounds are vesicular.     Adventitious sounds:  none    AB:  Abdominal shape is normal.  Bowel sounds are active. Generally soft to palpation. Palpation causes general upper abdominal discomfort without point tenderness. no involuntary guarding. no rebound guarding. EXTR:  Skin is warm. Capillary refill brisk. no specific or pathognomic rash. no clubbing. no pitting edema. no active wound or ulcer. LABS:  Lab Results   Component Value Date    WBC 10.5 07/20/2021    HGB 16.2 07/20/2021    HCT 46.2 07/20/2021    MCV 94.1 07/20/2021     07/20/2021     Lab Results   Component Value Date    CREATININE 0.7 (L) 07/20/2021    BUN 10 07/20/2021     (L) 07/20/2021    K 4.2 07/20/2021    CL 92 (L) 07/20/2021    CO2 26 07/20/2021     Lab Results   Component Value Date    ALT 21 07/20/2021    AST 36 07/20/2021    ALKPHOS 108 07/20/2021    BILITOT 0.5 07/20/2021     Lab Results   Component Value Date    CKTOTAL 696 (H) 10/31/2017    TROPONINI <0.01 07/20/2021     No results for input(s): PHART, IZD0ILW, PO2ART in the last 72 hours. IMAGING:  XR CHEST (2 VW)    Result Date: 7/20/2021  EXAMINATION: TWO XRAY VIEWS OF THE CHEST 7/20/2021 5:08 pm COMPARISON: Chest x-ray dated 10/31/2017 HISTORY: ORDERING SYSTEM PROVIDED HISTORY: n/v TECHNOLOGIST PROVIDED HISTORY: Reason for exam:->n/v Reason for Exam: n/v Acuity: Acute Type of Exam: Initial FINDINGS: LINES/TUBES/OTHER: Right IJ port noted with its tip in the right atrium. HEART/MEDIASTINUM: The cardiomediastinal silhouette is within normal limits. PLEURA/LUNGS: There are no focal consolidations or pleural effusions. There is no appreciable pneumothorax. BONES/SOFT TISSUE: No acute abnormality. No radiographic evidence of acute pulmonary disease.      CT Head WO Contrast    Result Date: 7/20/2021  EXAMINATION: CT OF THE HEAD WITHOUT CONTRAST  7/20/2021 6:18 pm TECHNIQUE: CT of the head was performed without the administration of intravenous contrast. Dose modulation, iterative reconstruction, and/or weight based adjustment of the mA/kV was utilized to reduce the radiation dose to as low as reasonably achievable. COMPARISON: None. HISTORY: ORDERING SYSTEM PROVIDED HISTORY: left sided ha FINDINGS: BRAIN/VENTRICLES: There is no acute intracranial hemorrhage, mass effect or midline shift. No abnormal extra-axial fluid collection. The gray-white differentiation is maintained without evidence of an acute infarct. There is prominence of the ventricles and sulci due to global parenchymal volume loss. There are nonspecific areas of hypoattenuation within the periventricular and subcortical white matter, which likely represent chronic microvascular ischemic change. ORBITS: The visualized portion of the orbits demonstrate no acute abnormality. SINUSES: The visualized paranasal sinuses and mastoid air cells demonstrate no acute abnormality. SOFT TISSUES/SKULL: No acute abnormality of the visualized skull or soft tissues. Age-related involutional changes with no acute intracranial abnormality. CT ABDOMEN PELVIS W IV CONTRAST Additional Contrast? None    Result Date: 7/21/2021  EXAMINATION: CT OF THE ABDOMEN AND PELVIS WITH CONTRAST 7/20/2021 11:10 pm TECHNIQUE: CT of the abdomen and pelvis was performed with the administration of intravenous contrast. Multiplanar reformatted images are provided for review. Dose modulation, iterative reconstruction, and/or weight based adjustment of the mA/kV was utilized to reduce the radiation dose to as low as reasonably achievable. COMPARISON: None.  HISTORY: ORDERING SYSTEM PROVIDED HISTORY: ABD pain with n/v TECHNOLOGIST PROVIDED HISTORY: Reason for exam:->ABD pain with n/v Additional Contrast?->None Decision Support Exception - unselect if not a suspected or confirmed emergency medical condition->Emergency Medical Condition (MA) Reason for Exam: Abdominal discomfort, nausea and vomiting, fatigue Acuity: Acute Type of Exam: Initial FINDINGS: Lower Chest: Lung bases demonstrate no focal consolidation or pleural effusion. Scattered atelectasis and granulomas. Calcified lymph nodes in the mediastinum and sheri. Organs: Liver: Unremarkable on this phase of enhancement. Spleen: Unremarkable on this phase of enhancement. Granulomas. Pancreas: Unremarkable on this phase of enhancement. Adrenal glands: Unremarkable on this phase of enhancement. Kidneys: Unremarkable on this phase of enhancement. Suboptimal evaluation for renal calculi due to presence of contrast in the renal collecting systems. No discrete renal, ureteral, or bladder calculi. Gallbladder: Incompletely distended. GI/Bowel: Evaluation of the bowel and mesentery is limited due to lack of enteric contrast. Visualized esophagus/stomach: Small hiatal hernia. Wall thickening of the visualized esophagus. Small bowel: No dilated small bowel. Large bowel: Scattered colonic diverticula without adjacent stranding. Appendix: Normal. Pelvis: Bladder is incompletely distended. Prostate and seminal vesicles appear unremarkable. Peritoneum/Retroperitoneum: Adenopathy: Suboptimal evaluation for adenopathy due to lack of enteric contrast. Abdominal aorta: No abdominal aortic aneurysm. Free fluid/air: No free fluid. No free air. Bones/Soft Tissues: Scattered degenerative changes noted in the visualized spine with spondylolistheses. 1. Wall thickening of the visualized esophagus. Infection versus inflammation. Clinical follow-up recommended. 2. No appendicitis, diverticulitis, or small bowel obstruction. 3. Other findings as described. I directly reviewed all recent imaging studies as well as pertinent prior studies. Radiology reports may or may not be available at the time of my review. EKG:  New and pertinent prior tracings were directly reviewed. My interpretation is as follows:  Normal sinus. Left axis deviation. Voltage for LVH. Nonspecific IVCD.     Active Hospital Problems    Diagnosis Date Noted    Headache [R51.9] 07/21/2021    Intractable nausea and vomiting [R11.2] 07/21/2021    History of oral cancer [Z85.819] 07/21/2021    Dysphagia [R13.10] 07/21/2021       ASSESSMENT & PLAN  Dysphagia, Nausea/Vomiting  -  Keep NPO.  -  Seems hypovolemic. Infuse LR 2L over 4 hours then reduce to maintenance. -  Start scheudled IV protonix. Antiemetics made available prn.  -  Will request input from the GI service. It seems likely the patient has reflex esophagitis and possibly a peptic stricture. Headache  -  Concerning to have a new onset persistent headache in patient's age group plus a history of head/neck cancer. Will request MRI brain w/o contrast primarily in hopes of excluding an intracranial mass lesion.     DVT prophylaxis: SCDs, lovenox  Code Status:  Full  Disposition:  Inpatient    Zachary Shearer MD MD

## 2021-07-21 NOTE — ED PROVIDER NOTES
The Ekg interpreted by me shows  normal sinus rhythm with a rate of 80  Axis is   Left axis deviation  QTc is  prolonged  Left ventricular hypertrophy with QRS widening  ST Segments: Cannot rule out septal infarct, age undetermined. No significant change from prior EKG dated October 31, 2017      I did not see or evaluate this patient. This EKG interpretation only.       Megan Dutton MD  07/20/21 0318

## 2021-07-22 ENCOUNTER — APPOINTMENT (OUTPATIENT)
Dept: MRI IMAGING | Age: 71
DRG: 305 | End: 2021-07-22
Payer: MEDICARE

## 2021-07-22 LAB
AMPHETAMINE SCREEN, URINE: NORMAL
ANION GAP SERPL CALCULATED.3IONS-SCNC: 14 MMOL/L (ref 3–16)
BARBITURATE SCREEN URINE: NORMAL
BASOPHILS ABSOLUTE: 0 K/UL (ref 0–0.2)
BASOPHILS RELATIVE PERCENT: 0.1 %
BENZODIAZEPINE SCREEN, URINE: NORMAL
BUN BLDV-MCNC: 9 MG/DL (ref 7–20)
CALCIUM SERPL-MCNC: 9.5 MG/DL (ref 8.3–10.6)
CANNABINOID SCREEN URINE: NORMAL
CHLORIDE BLD-SCNC: 95 MMOL/L (ref 99–110)
CO2: 27 MMOL/L (ref 21–32)
COCAINE METABOLITE SCREEN URINE: NORMAL
CORTISOL TOTAL: 30.3 UG/DL
CREAT SERPL-MCNC: 0.6 MG/DL (ref 0.8–1.3)
EOSINOPHILS ABSOLUTE: 0 K/UL (ref 0–0.6)
EOSINOPHILS RELATIVE PERCENT: 0 %
GFR AFRICAN AMERICAN: >60
GFR NON-AFRICAN AMERICAN: >60
GLUCOSE BLD-MCNC: 104 MG/DL (ref 70–99)
GLUCOSE BLD-MCNC: 104 MG/DL (ref 70–99)
GLUCOSE BLD-MCNC: 106 MG/DL (ref 70–99)
GLUCOSE BLD-MCNC: 109 MG/DL (ref 70–99)
GLUCOSE BLD-MCNC: 109 MG/DL (ref 70–99)
GLUCOSE BLD-MCNC: 119 MG/DL (ref 70–99)
HCT VFR BLD CALC: 45.3 % (ref 40.5–52.5)
HEMOGLOBIN: 15.4 G/DL (ref 13.5–17.5)
LYMPHOCYTES ABSOLUTE: 1 K/UL (ref 1–5.1)
LYMPHOCYTES RELATIVE PERCENT: 7.1 %
Lab: NORMAL
MAGNESIUM: 2 MG/DL (ref 1.8–2.4)
MCH RBC QN AUTO: 32.4 PG (ref 26–34)
MCHC RBC AUTO-ENTMCNC: 34 G/DL (ref 31–36)
MCV RBC AUTO: 95.2 FL (ref 80–100)
METHADONE SCREEN, URINE: NORMAL
MONOCYTES ABSOLUTE: 1 K/UL (ref 0–1.3)
MONOCYTES RELATIVE PERCENT: 6.9 %
NEUTROPHILS ABSOLUTE: 11.9 K/UL (ref 1.7–7.7)
NEUTROPHILS RELATIVE PERCENT: 85.9 %
OPIATE SCREEN URINE: NORMAL
OXYCODONE URINE: NORMAL
PDW BLD-RTO: 13.7 % (ref 12.4–15.4)
PERFORMED ON: ABNORMAL
PH UA: 7
PHENCYCLIDINE SCREEN URINE: NORMAL
PLATELET # BLD: 233 K/UL (ref 135–450)
PMV BLD AUTO: 6.5 FL (ref 5–10.5)
POTASSIUM SERPL-SCNC: 2.8 MMOL/L (ref 3.5–5.1)
PROPOXYPHENE SCREEN: NORMAL
RBC # BLD: 4.76 M/UL (ref 4.2–5.9)
SODIUM BLD-SCNC: 136 MMOL/L (ref 136–145)
T4 FREE: 0.9 NG/DL (ref 0.9–1.8)
TSH REFLEX FT4: 4.61 UIU/ML (ref 0.27–4.2)
WBC # BLD: 13.9 K/UL (ref 4–11)

## 2021-07-22 PROCEDURE — 82088 ASSAY OF ALDOSTERONE: CPT

## 2021-07-22 PROCEDURE — 2580000003 HC RX 258: Performed by: INTERNAL MEDICINE

## 2021-07-22 PROCEDURE — 70551 MRI BRAIN STEM W/O DYE: CPT

## 2021-07-22 PROCEDURE — 2500000003 HC RX 250 WO HCPCS: Performed by: INTERNAL MEDICINE

## 2021-07-22 PROCEDURE — 6370000000 HC RX 637 (ALT 250 FOR IP): Performed by: INTERNAL MEDICINE

## 2021-07-22 PROCEDURE — 82533 TOTAL CORTISOL: CPT

## 2021-07-22 PROCEDURE — 6360000002 HC RX W HCPCS: Performed by: INTERNAL MEDICINE

## 2021-07-22 PROCEDURE — 36415 COLL VENOUS BLD VENIPUNCTURE: CPT

## 2021-07-22 PROCEDURE — 83835 ASSAY OF METANEPHRINES: CPT

## 2021-07-22 PROCEDURE — 83735 ASSAY OF MAGNESIUM: CPT

## 2021-07-22 PROCEDURE — C9113 INJ PANTOPRAZOLE SODIUM, VIA: HCPCS | Performed by: INTERNAL MEDICINE

## 2021-07-22 PROCEDURE — 92526 ORAL FUNCTION THERAPY: CPT

## 2021-07-22 PROCEDURE — 1200000000 HC SEMI PRIVATE

## 2021-07-22 PROCEDURE — 80048 BASIC METABOLIC PNL TOTAL CA: CPT

## 2021-07-22 PROCEDURE — 85025 COMPLETE CBC W/AUTO DIFF WBC: CPT

## 2021-07-22 PROCEDURE — 84443 ASSAY THYROID STIM HORMONE: CPT

## 2021-07-22 PROCEDURE — 80307 DRUG TEST PRSMV CHEM ANLYZR: CPT

## 2021-07-22 PROCEDURE — 84439 ASSAY OF FREE THYROXINE: CPT

## 2021-07-22 PROCEDURE — 84244 ASSAY OF RENIN: CPT

## 2021-07-22 RX ORDER — METOPROLOL TARTRATE 5 MG/5ML
5 INJECTION INTRAVENOUS EVERY 6 HOURS PRN
Status: DISCONTINUED | OUTPATIENT
Start: 2021-07-22 | End: 2021-07-27 | Stop reason: HOSPADM

## 2021-07-22 RX ORDER — BUTALBITAL, ACETAMINOPHEN AND CAFFEINE 50; 325; 40 MG/1; MG/1; MG/1
1 TABLET ORAL EVERY 4 HOURS PRN
Status: DISCONTINUED | OUTPATIENT
Start: 2021-07-22 | End: 2021-07-27 | Stop reason: HOSPADM

## 2021-07-22 RX ORDER — DEXTROSE AND SODIUM CHLORIDE 5; .45 G/100ML; G/100ML
INJECTION, SOLUTION INTRAVENOUS CONTINUOUS
Status: DISCONTINUED | OUTPATIENT
Start: 2021-07-22 | End: 2021-07-23

## 2021-07-22 RX ORDER — AMLODIPINE BESYLATE 5 MG/1
10 TABLET ORAL DAILY
Status: DISCONTINUED | OUTPATIENT
Start: 2021-07-22 | End: 2021-07-22

## 2021-07-22 RX ORDER — POTASSIUM CHLORIDE 7.45 MG/ML
10 INJECTION INTRAVENOUS
Status: COMPLETED | OUTPATIENT
Start: 2021-07-22 | End: 2021-07-22

## 2021-07-22 RX ORDER — CLONIDINE 0.1 MG/24H
1 PATCH, EXTENDED RELEASE TRANSDERMAL WEEKLY
Status: DISCONTINUED | OUTPATIENT
Start: 2021-07-22 | End: 2021-07-27 | Stop reason: HOSPADM

## 2021-07-22 RX ORDER — LISINOPRIL 20 MG/1
20 TABLET ORAL DAILY
Status: DISCONTINUED | OUTPATIENT
Start: 2021-07-22 | End: 2021-07-22

## 2021-07-22 RX ADMIN — METOPROLOL TARTRATE 5 MG: 5 INJECTION INTRAVENOUS at 10:30

## 2021-07-22 RX ADMIN — POTASSIUM CHLORIDE 10 MEQ: 7.46 INJECTION, SOLUTION INTRAVENOUS at 18:50

## 2021-07-22 RX ADMIN — PANTOPRAZOLE SODIUM 40 MG: 40 INJECTION, POWDER, FOR SOLUTION INTRAVENOUS at 08:46

## 2021-07-22 RX ADMIN — ENOXAPARIN SODIUM 40 MG: 40 INJECTION SUBCUTANEOUS at 08:46

## 2021-07-22 RX ADMIN — POTASSIUM CHLORIDE 10 MEQ: 7.46 INJECTION, SOLUTION INTRAVENOUS at 20:28

## 2021-07-22 RX ADMIN — POTASSIUM CHLORIDE 10 MEQ: 7.46 INJECTION, SOLUTION INTRAVENOUS at 14:57

## 2021-07-22 RX ADMIN — Medication 10 ML: at 10:35

## 2021-07-22 RX ADMIN — POTASSIUM CHLORIDE 10 MEQ: 7.46 INJECTION, SOLUTION INTRAVENOUS at 16:15

## 2021-07-22 RX ADMIN — POTASSIUM CHLORIDE 10 MEQ: 7.46 INJECTION, SOLUTION INTRAVENOUS at 10:33

## 2021-07-22 RX ADMIN — DEXTROSE AND SODIUM CHLORIDE: 5; 450 INJECTION, SOLUTION INTRAVENOUS at 15:05

## 2021-07-22 RX ADMIN — Medication 10 ML: at 08:48

## 2021-07-22 RX ADMIN — POTASSIUM CHLORIDE 10 MEQ: 7.46 INJECTION, SOLUTION INTRAVENOUS at 12:52

## 2021-07-22 RX ADMIN — SODIUM CHLORIDE: 9 INJECTION, SOLUTION INTRAVENOUS at 01:27

## 2021-07-22 RX ADMIN — HYDRALAZINE HYDROCHLORIDE 10 MG: 20 INJECTION INTRAMUSCULAR; INTRAVENOUS at 08:46

## 2021-07-22 ASSESSMENT — PAIN SCALES - GENERAL
PAINLEVEL_OUTOF10: 0

## 2021-07-22 NOTE — CONSULTS
[address] [facility],  [address] [place] [address] Ave  [place], [place]  Phone: [address] He is a Single [1] White (non-) [1] 70 y.o. . male      Main Problems/Chief Complaint for which GI service is seeing pt     Dysphagia    Clinical Summary      The patient has had oral cancer for which he has undergone multiple surgeries. In spite of these surgeries, he was doing relatively well and was swallowing decent amount of food. But recently, there has been progressive worsening dysphagia and he is unable to swallow much except for the liquids. There is no nausea or vomiting. No overt bleeding. The weight is slightly lower. PAST MEDICAL HISTORY     Past Medical History:   Diagnosis Date    Cancer [facility])     jaw    Radiation     S/P chemotherapy, time since greater than 12 weeks      FAMILY HISTORY   History reviewed. No pertinent family history. SURGICAL HISTORY     Past Surgical History:   Procedure Laterality Date    EYE SURGERY Right 12/26/13    cataract    OTHER SURGICAL HISTORY      jaw cancer     CURRENT MEDICATIONS   (This list may include medications prescribed during this encounter as epic can not insert only the list prior to this encounter.)      ALLERGIES     Allergies   Allergen Reactions    Morphine        REVIEW OF SYSTEMS   No chest pain suspicious for cardiac origin  No SOB    PHYSICAL EXAM   Vitals as per nursing record. Deformities of the lower jaw and neck-status post surgeries for oral cancer. Protruding tongue. Heart: WNL  Lungs: Clear to A&P  Abd: soft, non-tender, no masses are felt. Neurologic: Alert & oriented x 3. Psych: Good mood and memory. Pt is able to make appropriate decisions. FINAL IMPRESSION AND RECOMMENDATIONS     The patient's anatomy is likely to be difficult. This is because of the surgeries he has had.   He will need an EGD, but for now, as discussed with him and his sister, it is best to get a barium study to delineate the problem first.  The patient understands and agrees to undergo this test.    The total time spent face-to-face, review of the record and on the claudio during this visit was 30 minutes with more than 50% of the time spent in review of the electronic record to get some handle on what was done previously surgically and coordination of care for this stepwise work-up of dysphagia and counseling pt for alternatives for work-up and my preference about the steps necessary-a relatively complication free test first before undertaking the invasive procedure with more risks involved in his case. Dale Zepeda MD 7/21/21 9:13 PM EDT    CC:  No primary care provider on file. IMPORTANT: Please note that some portions of this note may have been created using Dragon voice recognition software. Some \"sound-alike\" and totally wrong word substitutions may have taken place due to known inherent limitations of any such software, including this voice recognition software. In spite of efforts to eliminate such errors, some may not have been corrected. So please read the note with this in mind and recognize such mistakes and understand the correct version using the  context. Thanks.

## 2021-07-22 NOTE — PROGRESS NOTES
Speech Language Pathology  Facility/Department: [facility] - MED SURG  Dysphagia Daily Treatment Note    NAME: Darian Daniels  : 1950  MRN: [id number]    Patient Diagnosis(es):   Patient Active Problem List    Diagnosis Date Noted    Headache 2021    Intractable nausea and vomiting 2021    History of oral cancer 2021    Dysphagia 2021    Moderate protein-calorie malnutrition ([facility] [address] 2017    Nondisplaced unspecified condyle fracture of lower end of left femur, initial encounter for closed fracture ([facility] [address] 10/31/2017    Alcohol abuse 10/31/2017    Lactic acidosis 10/31/2017    Hyponatremia 10/31/2017     Allergies: Allergies   Allergen Reactions    Morphine      Subjective: 70year old male admitted on 21 with \"headache. \" Pt is s/p partial glossectomy and mandibulectomy (about seven years ago in ) followed by radiation therapy. Pain: denies    Current Diet: Diet NPO Exceptions are: Ice Chips    Diet Tolerance:  Patient tolerating current diet level without signs/symptoms of penetration / aspiration. P.O. Trials: Thin   x Cup sip x 2, straw sip x 15   Puree   x X 12   Solid         Dysphagia Treatment and Impressions:  RN okays SLP entry into pt's room. Pt is asleep in bed but awakens easily with verbal stim. RR 16/min with O2 sat of 97% on RA. Of note, pt's tongue is positioned anteriorly and often interdentally (placed in between gum surfaces anteriorly as pt is edentulous) throughout assessment. With oral motor assessment, there is obvious lack of lateralization to left labial seal, and left labial seal is impaired. Thin liquids via cup sip result in rodney anterior loss of partial bolus. Pt requested to use straw, and when he used straw with thin liquids, there was nearly no anterior loss. Vocal quality remains dry and clear throughout assessment. Pt able to swallow pureed food trials without overt s/s of aspiration.   SLP notes occasional spontaneous double swallow, but pt does not c/o pharyngeal globus sensation. There was minimal left anterior loss of pureed food trial due to impaired labial seal on this side. Pt reports that he eats softer foods at home, like TV dinners with soft turkey cuts. RR stable at 16/min following po trials with O2 sat of 95% on RA. Dysphagia Goals:  Timeframe for Long-term Goals: 7 days (7/28/21)  Goal 1: The pt will tolerate safest and least restrictive diet without clinical s/s of aspiration. Today, 07/22: Progressing. Ongoing. Short-term Goals  Timeframe for Short-term Goals: 5 days (7/26/21)  1. The patient will tolerate recommended diet without observed clinical signs of aspiration, Today, 07/22: Progressing. Ongoing. 2. The patient/caregiver will demonstrate understanding of compensatory strategies for improved swallowing safety. , Today, 07/22: Progressing. Ongoing. 3. The patient will tolerate instrumental swallowing procedure, Not indicated this date  4. The patient will tolerate repeat BSE when able. Today, 07/22: Progressing. Ongoing. Recommendations:  Solid Consistency: advance to MINCED/MOIST  Liquid Consistency: advance to THIN  Medication: crushed in puree    Patient/Family/Caregiver Education: reviewed plan of care and aspiration precautions with pt, RN in room    Compensatory Strategies:   Small bites/sips  Upright for all po intake  Straws with liquids are OK  Alternate solids and liquids    Plan:    Continued Dysphagia treatment with goals per plan of care. Discharge Recommendations: defer to PT/OT    If pt discharges from hospital prior to Speech/Swallowing discharge, this note serves as tx and discharge summary. Total Treatment Time / Charges     Time in Time out Total Time / units   Cognitive Tx         Speech Tx      Dysphagia Tx  [id number] 19 min / 1 unit     Signature:    Dolores Corcoran, [address] XF[address][id number]  Speech-Language Pathologist

## 2021-07-22 NOTE — CONSULTS
[facility]    [facility]              [phone]                        Interval History and plan:      Blood pressure is extremely high on admission  Coming down but is  still  On  high side  Currently on clonidine patch and also as needed hydralazine and metoprolol  Plan:  Agree with current management  Hopefully blood pressure will continue to get better  We will need to switch to oral treatment as soon as possible  Also given that he has severe hypertension and hyperkalemia will check for aldosterone and renin ratio  Also will check for metanephrines  Goal of blood pressure by tomorrow-150/90 and not lower  He might benefit from neurology input because of possible hydrocephalus   Hypokalemia is associated with more salt reabsorption in kidney tubules and hypertension  Treatment of hypokalemia will help  Decrease the NS rate that should also help the blood pressure                   Assessment :     Hypertension - emergency  BP: (161-184)/()  Pulse:  [78-83]   BP goal inpatient [phone] systolic inpatient  Severe  Peak was 205/130 on admission coming down significantly though is still on high side at the time of consult  UA-bland  CT abdomen on 7/21-kidneys unremarkable, unremarkable adrenal glands  Renal function is normal    Hypokalemia  Likely due to nausea and vomiting      [facility] would like to thank Rebeka Aguirre MD   for opportunity to serve this patient      Please call with questions at-   24 Hrs Answering service [phone] or  7 am- 5 pm via Perfect serve or cell phone  Arianna Partida MD          CC/reason for consult :     Hypertension urgency     HPI :     Jimy Ziegler is a 70 y.o. male presented to   the hospital on 7/20/2021 with left-sided headache nausea and vomiting. He has dysarthria and unable to provide much details. Per report she also has syncopal episodes several times over the last 7 days.   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 He also has dysphagia because of which he is unable to take p.o. we will continue p.o. medications. He is not known to be on any medication at home. He is currently n.p.o. and getting evaluated for dysphagia. He is noted to have severe hypertension which has been difficult to control. He is also complaining dizziness and headache for which he has had CT scan of the brain done and also MRI. He has chronic microvascular ischemic changes normal pressure hydrocephalus cannot be excluded. We are consulted for severe hypertension and related issues    ROS:     Seen with-no family    positives in bold   Constitutional:  fever, chills, weakness, weight change, fatigue  Skin:  rash, pruritus, hair loss, bruising, dry skin, petechiae  Head, Face, Neck   headaches, swelling,  cervical adenopathy  Respiratory: shortness of breath, cough, or wheezing  Cardiovascular: chest pain, palpitations, dizzy, edema  Gastrointestinal: nausea, vomiting, diarrhea, constipation,belly pain    Yellow skin, blood in stool  Musculoskeletal:  back pain, muscle weakness, gait problems,       joint pain or swelling. Genitourinary:  dysuria, poor urine flow, flank pain, blood in urine  Neurologic:  vertigo, TIA'S, syncope, seizures, focal weakness  Psychosocial:  insomnia, anxiety, or depression. Additional positive findings:             All other remaining systems are negative or unable to obtain        PMH/PSH/SH/Family History:     Past Medical History:   Diagnosis Date    Cancer ([facility] [address]     jaw    Radiation     S/P chemotherapy, time since greater than 12 weeks        Past Surgical History:   Procedure Laterality Date    EYE SURGERY Right 12/26/13    cataract    OTHER SURGICAL HISTORY      jaw cancer        reports that he quit smoking about 8 years ago. He quit after 40.00 years of use. He has never used smokeless tobacco. He reports current alcohol use.  He reports that symmetrical, JVD- not visible  Respiratory: Respiratory effort-normal, wheeze- no, crackles -none  Cardiovascular:  Ausculation- No M/R/G, Edema none  Abdomen: visible mass- no, distention- no, scar- no, tenderness- no                            hepatosplenomegaly-  no  Musculoskeletal:  clubbing no,cyanosis- no , digital ischemia- no                           muscle strength- grossly normal , tone - grossly normal  Skin: rashes- no , ulcers- no, induration- no, tightening - no  Psychiatric:  Judgement and insight- normal           AAO X 3  Additional finding:-     LABS:     Recent Labs     07/20/21  1705 07/21/21  0834 07/22/21  0748   WBC 10.5 16.5* 13.9*   HGB 16.2 17.2 15.4   HCT 46.2 49.2 45.3    253 233     Recent Labs     07/20/21  1705 07/21/21  0901 07/22/21  0748   * 133* 136   K 4.2 3.5 2.8*   CL 92* 92* 95*   CO2 26 23 27   BUN 10 13 9   CREATININE 0.7* 0.7* 0.6*   GLUCOSE 118* 151* 119*   MG  --  2.30 2.00

## 2021-07-22 NOTE — PROGRESS NOTES
Hospitalist Progress Note      PCP: No primary care provider on file. Date of Admission: 7/20/2021    Chief Complaint:   Headache/ dysphagia    Hospital Course:   Snehal Conte a 70 y. o. male with a history of head and neck cancer-apparently in remission. Has had jaw surgery. He was admitted for intractable headache as well as dysphagia to solids more than liquids. He has a history of partial glossectomy and mandibulectomy about seven years ago in 2014 followed by radiation therapy. Kimberly Henao states he completed five years of regular follow up and monitoring thereafter. Sony Mike does not seem he has gotten much medical care aside from that. [facility] does not take any prescribed medications. His headache is mostly in the temporal region on the left side more so in the last 3 days. He denies any visual symptoms but has photophobia. He has associated nausea and vomiting. Has had dysphagia to solids as well but needs work-up. He had an esophagogram this morning suggestive of esophagitis with impaired peristalsis.       Subjective:   Headache reduced slightly  Denies nausea or vomiting  No visual symptoms      Medications:  Reviewed    Infusion Medications    sodium chloride      sodium chloride 80 mL/hr at 07/22/21 0127     Scheduled Medications    pantoprazole  40 mg Intravenous Daily    enoxaparin  40 mg Subcutaneous Daily     PRN Meds: promethazine, promethazine, sodium chloride flush, sodium chloride, potassium chloride **OR** potassium alternative oral replacement **OR** potassium chloride, magnesium sulfate, acetaminophen **OR** acetaminophen, hydrALAZINE      Intake/Output Summary (Last 24 hours) at 7/22/2021 0947  Last data filed at 7/22/2021 0618  Gross per 24 hour   Intake 2578.79 ml   Output 1200 ml   Net 1378.79 ml       Physical Exam Performed:    BP (!) 179/91   Pulse 78   Temp 97.6 °F (36.4 °C)   Resp 14   Ht 5' 9\" (1.753 m)   Wt 160 lb 4.4 oz (72.7 kg)   SpO2 96%   BMI 23.67 kg/m² General appearance: No apparent distress, appears stated age and cooperative. HEENT: Glossectomised, eccentric/ exposed leonor/dry. Pupils equal, round, and reactive to light. Conjunctivae/corneas clear. Neck: Supple, with full range of motion. No jugular venous distention. Trachea midline. Respiratory:  Normal respiratory effort. Clear to auscultation, bilaterally without Rales/Wheezes/Rhonchi. Cardiovascular: Regular rate and rhythm with normal S1/S2 without murmurs, rubs or gallops. Abdomen: Soft, non-tender, non-distended with normal bowel sounds. Musculoskeletal: No clubbing, cyanosis or edema bilaterally. Full range of motion without deformity. Skin: Skin color, texture, turgor normal.  No rashes or lesions. Neurologic:  Neurovascularly intact without any focal sensory/motor deficits. Cranial nerves: II-XII intact, grossly non-focal.  Psychiatric: Alert and oriented, thought content appropriate, normal insight  Capillary Refill: Brisk,3 seconds, normal   Peripheral Pulses: +2 palpable, equal bilaterally       Labs:   Recent Labs     07/20/21  1705 07/21/21  0834 07/22/21  0748   WBC 10.5 16.5* 13.9*   HGB 16.2 17.2 15.4   HCT 46.2 49.2 45.3    253 233     Recent Labs     07/20/21  1705 07/21/21  0901 07/22/21  0748   * 133* 136   K 4.2 3.5 2.8*   CL 92* 92* 95*   CO2 26 23 27   BUN 10 13 9   CREATININE 0.7* 0.7* 0.6*   CALCIUM 10.1 10.0 9.5     Recent Labs     07/20/21  1705   AST 36   ALT 21   BILITOT 0.5   ALKPHOS 108     No results for input(s): INR in the last 72 hours. Recent Labs     07/20/21  1705   TROPONINI <0.01       Urinalysis:      Lab Results   Component Value Date    NITRU Negative 07/20/2021    BLOODU Negative 07/20/2021    SPECGRAV 1.015 07/20/2021    GLUCOSEU Negative 07/20/2021    GLUCOSEU Neg 03/25/2010       Radiology:  MRI BRAIN WO CONTRAST   Final Result   1. No acute intracranial abnormality. No acute infarct.    2. There is global parenchymal volume loss with mild chronic microvascular   ischemic changes. 3.  The ventricular dilatation is out of proportion to the cortical sulci. A   component of normal pressure hydrocephalus cannot be excluded. FL ESOPHAGRAM   Final Result   1. Suboptimal study due to patient's limited ability to cooperate. 2. Esophageal dysmotility with disordered primary peristalsis. 3. Findings suggestive of smooth luminal narrowing of the distal esophagus in   the region of the GE junction. Finding could be on the basis of reflux   esophagitis. Other etiologies cannot be excluded based on this examination. CT ABDOMEN PELVIS W IV CONTRAST Additional Contrast? None   Final Result   1. Wall thickening of the visualized esophagus. Infection versus   inflammation. Clinical follow-up recommended. 2. No appendicitis, diverticulitis, or small bowel obstruction. 3. Other findings as described. CT Head WO Contrast   Final Result   Age-related involutional changes with no acute intracranial abnormality. XR CHEST (2 VW)   Final Result   No radiographic evidence of acute pulmonary disease. ASSESSMENT & PLAN  1. Hypertensive urgency  -Start Lopressor 5mg iv q6 prn for BP >160/95mmHg  -Add clonidine 0.1mg/d patch    2. Hypokalemia  -KCl 20mEq iv x 3 doses q4  -Check K level after 3rd dose  -Fluid resus    3. Dysphagia to solids more than liquids associated with Nausea/Vomiting. Esophagogram suggestive of reflux esophagitis/poor peristalsis. - Continue fluid resuscitation   - GI consulted, plan for EGD today.   -Continue IV protonix.    -Use antiemetics prn.  Joselyn James request input from the GI service. Isabella Nichole seems likely the patient has reflex esophagitis and possibly a peptic stricture.     4. Headache-bothersome. Unknown etiology yet.  MRI head suggestive of normal pressure hydrocephalus  -Consult Neurology  -Try Fioricet       DVT prophylaxis:        SCDs, lovenox  Code Status:               Full  Disposition:                 Inpatient     PT/OT Eval Status: Lovenox     Dispo -keep as an inpatient  Gee Tee MD

## 2021-07-22 NOTE — PLAN OF CARE
Problem: Falls - Risk of:  Goal: Will remain free from falls  Description: Will remain free from falls  7/21/2021 2135 by Phil Bhat RN  Outcome: Met This Shift  7/21/2021 1123 by Vernette Goodell, RN  Outcome: Ongoing  Goal: Absence of physical injury  Description: Absence of physical injury  7/21/2021 2135 by Phil Bhat RN  Outcome: Met This Shift  7/21/2021 1123 by Vernette Goodell, RN  Outcome: Ongoing     Problem: Nausea/Vomiting:  Goal: Absence of nausea/vomiting  Description: Absence of nausea/vomiting  7/21/2021 2135 by Phil Bhat RN  Outcome: Met This Shift  7/21/2021 1123 by Vernette Goodell, RN  Outcome: Ongoing     Problem: Nausea/Vomiting:  Goal: Able to drink  Description: Able to drink  7/21/2021 2135 by Phil Bhat RN  Outcome: Ongoing  7/21/2021 1123 by Vernette Goodell, RN  Outcome: Ongoing  Goal: Able to eat  Description: Able to eat  7/21/2021 2135 by Phil Bhat RN  Outcome: Ongoing  7/21/2021 1123 by Vernette Goodell, RN  Outcome: Ongoing     Problem: Skin Integrity:  Goal: Will show no infection signs and symptoms  Description: Will show no infection signs and symptoms  Outcome: Ongoing  Goal: Absence of new skin breakdown  Description: Absence of new skin breakdown  Outcome: Ongoing     Problem: Nausea/Vomiting:  Goal: Ability to achieve adequate nutritional intake will improve  Description: Ability to achieve adequate nutritional intake will improve  7/21/2021 2135 by Phil Bhat RN  Outcome: Not Met This Shift  7/21/2021 1123 by Vernette Goodell, RN  Outcome: Ongoing

## 2021-07-22 NOTE — CARE COORDINATION
CASE MANAGEMENT INITIAL ASSESSMENT      Reviewed chart and completed assessment at bedside with patient. Explained Case Management role/services. Primary contact information:Karen Maria  [phone]    Health Care Decision Maker :     As above      Can this person be reached and be able to respond quickly, such as within a few minutes or hours? Yes  Who would be your back-up decision maker? Name [facility]   Phone Number:-[phone]    [facility] date/status:7/21/21    Diagnosis:headache  Is this a Readmission?:  No      Insurance:Medicare    Precert required for SNF: No       3 night stay required: Yes    Living arrangements, Adls, care needs, prior to admission:Lives in one story home with sisters.  Uses walker and cane as needed    Transportation:self and family  Durable Medical Equipment at home:  Walker_x_Cane_x_RTS__ BSC__Shower Chair__  02__ HHN__ CPAP__  BiPap__  Hospital Bed__ W/C___ Other__________    Services in the home and/or outpatient, prior to [address] (if applicable)   · Name:  · Address:  · Dialysis Schedule:  · Phone:  · Fax:    PT/OT recs:not ordered    Hospital Exemption Notification (HEN):needed if SNF    Barriers to discharge:none    Plan/comments:Plans dc home with family    ECOC on chart for MD pauline Griffin RN

## 2021-07-23 LAB
ANION GAP SERPL CALCULATED.3IONS-SCNC: 11 MMOL/L (ref 3–16)
ANION GAP SERPL CALCULATED.3IONS-SCNC: 13 MMOL/L (ref 3–16)
BASOPHILS ABSOLUTE: 0 K/UL (ref 0–0.2)
BASOPHILS RELATIVE PERCENT: 0.2 %
BUN BLDV-MCNC: 10 MG/DL (ref 7–20)
BUN BLDV-MCNC: 9 MG/DL (ref 7–20)
CALCIUM SERPL-MCNC: 9.4 MG/DL (ref 8.3–10.6)
CALCIUM SERPL-MCNC: 9.4 MG/DL (ref 8.3–10.6)
CHLORIDE BLD-SCNC: 94 MMOL/L (ref 99–110)
CHLORIDE BLD-SCNC: 95 MMOL/L (ref 99–110)
CO2: 25 MMOL/L (ref 21–32)
CO2: 26 MMOL/L (ref 21–32)
CREAT SERPL-MCNC: <0.5 MG/DL (ref 0.8–1.3)
CREAT SERPL-MCNC: <0.5 MG/DL (ref 0.8–1.3)
EOSINOPHILS ABSOLUTE: 0 K/UL (ref 0–0.6)
EOSINOPHILS RELATIVE PERCENT: 0.2 %
GFR AFRICAN AMERICAN: >60
GFR AFRICAN AMERICAN: >60
GFR NON-AFRICAN AMERICAN: >60
GFR NON-AFRICAN AMERICAN: >60
GLUCOSE BLD-MCNC: 111 MG/DL (ref 70–99)
GLUCOSE BLD-MCNC: 111 MG/DL (ref 70–99)
GLUCOSE BLD-MCNC: 114 MG/DL (ref 70–99)
GLUCOSE BLD-MCNC: 123 MG/DL (ref 70–99)
GLUCOSE BLD-MCNC: 130 MG/DL (ref 70–99)
GLUCOSE BLD-MCNC: 134 MG/DL (ref 70–99)
HCT VFR BLD CALC: 46.3 % (ref 40.5–52.5)
HEMOGLOBIN: 16.3 G/DL (ref 13.5–17.5)
LYMPHOCYTES ABSOLUTE: 1.1 K/UL (ref 1–5.1)
LYMPHOCYTES RELATIVE PERCENT: 10 %
MAGNESIUM: 2 MG/DL (ref 1.8–2.4)
MCH RBC QN AUTO: 33.2 PG (ref 26–34)
MCHC RBC AUTO-ENTMCNC: 35.3 G/DL (ref 31–36)
MCV RBC AUTO: 94 FL (ref 80–100)
MONOCYTES ABSOLUTE: 1 K/UL (ref 0–1.3)
MONOCYTES RELATIVE PERCENT: 9.7 %
NEUTROPHILS ABSOLUTE: 8.5 K/UL (ref 1.7–7.7)
NEUTROPHILS RELATIVE PERCENT: 79.9 %
PDW BLD-RTO: 13.3 % (ref 12.4–15.4)
PERFORMED ON: ABNORMAL
PLATELET # BLD: 238 K/UL (ref 135–450)
PMV BLD AUTO: 6.4 FL (ref 5–10.5)
POTASSIUM SERPL-SCNC: 2.7 MMOL/L (ref 3.5–5.1)
POTASSIUM SERPL-SCNC: 2.7 MMOL/L (ref 3.5–5.1)
POTASSIUM SERPL-SCNC: 3.1 MMOL/L (ref 3.5–5.1)
RBC # BLD: 4.92 M/UL (ref 4.2–5.9)
SODIUM BLD-SCNC: 132 MMOL/L (ref 136–145)
SODIUM BLD-SCNC: 132 MMOL/L (ref 136–145)
WBC # BLD: 10.7 K/UL (ref 4–11)

## 2021-07-23 PROCEDURE — 80048 BASIC METABOLIC PNL TOTAL CA: CPT

## 2021-07-23 PROCEDURE — 6360000002 HC RX W HCPCS: Performed by: INTERNAL MEDICINE

## 2021-07-23 PROCEDURE — 83735 ASSAY OF MAGNESIUM: CPT

## 2021-07-23 PROCEDURE — 84132 ASSAY OF SERUM POTASSIUM: CPT

## 2021-07-23 PROCEDURE — 1200000000 HC SEMI PRIVATE

## 2021-07-23 PROCEDURE — 99232 SBSQ HOSP IP/OBS MODERATE 35: CPT | Performed by: INTERNAL MEDICINE

## 2021-07-23 PROCEDURE — 85025 COMPLETE CBC W/AUTO DIFF WBC: CPT

## 2021-07-23 PROCEDURE — 93975 VASCULAR STUDY: CPT

## 2021-07-23 PROCEDURE — 36415 COLL VENOUS BLD VENIPUNCTURE: CPT

## 2021-07-23 PROCEDURE — 92526 ORAL FUNCTION THERAPY: CPT

## 2021-07-23 PROCEDURE — C9113 INJ PANTOPRAZOLE SODIUM, VIA: HCPCS | Performed by: INTERNAL MEDICINE

## 2021-07-23 PROCEDURE — 99222 1ST HOSP IP/OBS MODERATE 55: CPT | Performed by: PSYCHIATRY & NEUROLOGY

## 2021-07-23 RX ORDER — SODIUM CHLORIDE AND POTASSIUM CHLORIDE .9; .15 G/100ML; G/100ML
SOLUTION INTRAVENOUS CONTINUOUS
Status: DISCONTINUED | OUTPATIENT
Start: 2021-07-23 | End: 2021-07-23

## 2021-07-23 RX ORDER — POTASSIUM CHLORIDE AND SODIUM CHLORIDE 900; 300 MG/100ML; MG/100ML
INJECTION, SOLUTION INTRAVENOUS CONTINUOUS
Status: DISCONTINUED | OUTPATIENT
Start: 2021-07-23 | End: 2021-07-25

## 2021-07-23 RX ORDER — POTASSIUM CHLORIDE 20 MEQ/1
20 TABLET, EXTENDED RELEASE ORAL PRN
Status: DISCONTINUED | OUTPATIENT
Start: 2021-07-23 | End: 2021-07-27 | Stop reason: HOSPADM

## 2021-07-23 RX ORDER — POTASSIUM CHLORIDE 7.45 MG/ML
10 INJECTION INTRAVENOUS
Status: DISPENSED | OUTPATIENT
Start: 2021-07-23 | End: 2021-07-23

## 2021-07-23 RX ORDER — POTASSIUM CHLORIDE 7.45 MG/ML
10 INJECTION INTRAVENOUS PRN
Status: DISCONTINUED | OUTPATIENT
Start: 2021-07-23 | End: 2021-07-27 | Stop reason: HOSPADM

## 2021-07-23 RX ORDER — POTASSIUM CHLORIDE 20 MEQ/1
40 TABLET, EXTENDED RELEASE ORAL EVERY 4 HOURS
Status: DISCONTINUED | OUTPATIENT
Start: 2021-07-23 | End: 2021-07-23

## 2021-07-23 RX ADMIN — POTASSIUM CHLORIDE 10 MEQ: 7.46 INJECTION, SOLUTION INTRAVENOUS at 17:01

## 2021-07-23 RX ADMIN — PANTOPRAZOLE SODIUM 40 MG: 40 INJECTION, POWDER, FOR SOLUTION INTRAVENOUS at 10:54

## 2021-07-23 RX ADMIN — POTASSIUM CHLORIDE 10 MEQ: 7.46 INJECTION, SOLUTION INTRAVENOUS at 19:15

## 2021-07-23 RX ADMIN — POTASSIUM CHLORIDE 10 MEQ: 7.46 INJECTION, SOLUTION INTRAVENOUS at 03:14

## 2021-07-23 RX ADMIN — POTASSIUM CHLORIDE 10 MEQ: 7.46 INJECTION, SOLUTION INTRAVENOUS at 10:54

## 2021-07-23 RX ADMIN — POTASSIUM CHLORIDE 10 MEQ: 7.46 INJECTION, SOLUTION INTRAVENOUS at 05:55

## 2021-07-23 RX ADMIN — POTASSIUM CHLORIDE 10 MEQ: 7.46 INJECTION, SOLUTION INTRAVENOUS at 20:09

## 2021-07-23 RX ADMIN — POTASSIUM CHLORIDE 10 MEQ: 7.46 INJECTION, SOLUTION INTRAVENOUS at 01:23

## 2021-07-23 RX ADMIN — POTASSIUM CHLORIDE 10 MEQ: 7.46 INJECTION, SOLUTION INTRAVENOUS at 04:24

## 2021-07-23 RX ADMIN — ENOXAPARIN SODIUM 40 MG: 40 INJECTION SUBCUTANEOUS at 10:54

## 2021-07-23 RX ADMIN — POTASSIUM CHLORIDE 10 MEQ: 7.46 INJECTION, SOLUTION INTRAVENOUS at 07:52

## 2021-07-23 RX ADMIN — POTASSIUM CHLORIDE 10 MEQ: 7.46 INJECTION, SOLUTION INTRAVENOUS at 18:52

## 2021-07-23 RX ADMIN — POTASSIUM CHLORIDE AND SODIUM CHLORIDE: 900; 300 INJECTION, SOLUTION INTRAVENOUS at 17:01

## 2021-07-23 ASSESSMENT — PAIN SCALES - GENERAL
PAINLEVEL_OUTOF10: 0

## 2021-07-23 ASSESSMENT — ENCOUNTER SYMPTOMS
BACK PAIN: 0
DIARRHEA: 0
WHEEZING: 0
COLOR CHANGE: 0
VOMITING: 0
COUGH: 0
ABDOMINAL PAIN: 0
NAUSEA: 0
PHOTOPHOBIA: 0
SHORTNESS OF BREATH: 0

## 2021-07-23 NOTE — PLAN OF CARE
Problem: Nutrition  Goal: Optimal nutrition therapy  7/23/2021 1507 by Johann Guerra, MS, RD, LD  Outcome: Ongoing  Note: Nutrition Problem #1: Inadequate oral intake  Intervention: Food and/or Nutrient Delivery: Continue Current Diet, Start Oral Nutrition Supplement  Nutritional Goals:  Tolerate and consume 50% or greater of meals and ONS this admission w/o signs of aspiration

## 2021-07-23 NOTE — PROGRESS NOTES
[facility]    [facility]              [phone]                        Interval History and plan:      BP is normal  On clonidine patch  Cr is normal  Denies dizziness  Still NPO    Plan:  Asked RN to remove clonidine patch if his BP drops below 517 systolic, or if he feels dizzy  Aldosterone/renin, renal duplex pending                   Assessment :     Hypertension - emergency  BP: (120-128)/(75-93)  Pulse:  [80-85]   BP goal inpatient [phone] systolic inpatient  Severe  Peak was 205/130 on admission coming down significantly though is still on high side at the time of consult  UA-bland  CT abdomen on 7/21-kidneys unremarkable, unremarkable adrenal glands  Renal function is normal    Hypokalemia  Likely due to nausea and vomiting      [facility] would like to thank Yvette Campbell MD   for opportunity to serve this patient      Please call with questions at-   24 Hrs Answering service [phone] or  7 am- 5 pm via Perfect serve or cell phone  Montse Trujillo MD          CC/reason for consult :     Hypertension urgency     HPI :     Deja Valerio is a 70 y.o. male presented to   the hospital on 7/20/2021 with left-sided headache nausea and vomiting. He has dysarthria and unable to provide much details. Per report she also has syncopal episodes several times over the last 7 days. 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 He also has dysphagia because of which he is unable to take p.o. we will continue p.o. medications. He is not known to be on any medication at home. He is currently n.p.o. and getting evaluated for dysphagia. He is noted to have severe hypertension which has been difficult to control. He is also complaining dizziness and headache for which he has had CT scan of the brain done and also MRI.   He has chronic microvascular ischemic changes normal pressure hydrocephalus cannot be excluded. We are consulted for severe hypertension and related issues    ROS:     Seen with-no family    positives in bold   Constitutional:  fever, chills, weakness, weight change, fatigue  Skin:  rash, pruritus, hair loss, bruising, dry skin, petechiae  Head, Face, Neck   headaches, swelling,  cervical adenopathy  Respiratory: shortness of breath, cough, or wheezing  Cardiovascular: chest pain, palpitations, dizzy, edema  Gastrointestinal: nausea, vomiting, diarrhea, constipation,belly pain    Yellow skin, blood in stool  Musculoskeletal:  back pain, muscle weakness, gait problems,       joint pain or swelling. Genitourinary:  dysuria, poor urine flow, flank pain, blood in urine  Neurologic:  vertigo, TIA'S, syncope, seizures, focal weakness  Psychosocial:  insomnia, anxiety, or depression. Additional positive findings:             All other remaining systems are negative or unable to obtain        PMH/PSH/SH/Family History:     Past Medical History:   Diagnosis Date    Cancer ([facility] [address]     jaw    Radiation     S/P chemotherapy, time since greater than 12 weeks        Past Surgical History:   Procedure Laterality Date    EYE SURGERY Right 12/26/13    cataract    OTHER SURGICAL HISTORY      jaw cancer        reports that he quit smoking about 8 years ago. He quit after 40.00 years of use. He has never used smokeless tobacco. He reports current alcohol use. He reports that he does not use drugs. family history is not on file.          Medication:     Current Facility-Administered Medications: butalbital-acetaminophen-caffeine (FIORICET, ESGIC) per tablet 1 tablet, 1 tablet, Oral, Q4H PRN  metoprolol (LOPRESSOR) injection 5 mg, 5 mg, Intravenous, Q6H PRN  cloNIDine (CATAPRES) 0.1 MG/24HR 1 patch, 1 patch, Transdermal, Weekly  dextrose 5 % and 0.45 % sodium chloride infusion, , Intravenous, Continuous  promethazine (PHENERGAN) injection 12.5 mg, 12.5 mg, Intravenous, Q4H PRN  promethazine (PHENERGAN) tablet 12.5 mg, 12.5 mg, Oral, Q4H PRN  pantoprazole (PROTONIX) injection 40 mg, 40 mg, Intravenous, Daily  sodium chloride flush 0.9 % injection 10 mL, 10 mL, Intravenous, PRN  0.9 % sodium chloride infusion, 25 mL, Intravenous, PRN  potassium chloride (KLOR-CON M) extended release tablet 40 mEq, 40 mEq, Oral, PRN **OR** potassium bicarb-citric acid (EFFER-K) effervescent tablet 40 mEq, 40 mEq, Oral, PRN **OR** potassium chloride 10 mEq/100 mL IVPB (Peripheral Line), 10 mEq, Intravenous, PRN  magnesium sulfate 1000 mg in dextrose 5% 100 mL IVPB, 1,000 mg, Intravenous, PRN  acetaminophen (TYLENOL) tablet 650 mg, 650 mg, Oral, Q6H PRN **OR** acetaminophen (TYLENOL) suppository 650 mg, 650 mg, Rectal, Q6H PRN  enoxaparin (LOVENOX) injection 40 mg, 40 mg, Subcutaneous, Daily  hydrALAZINE (APRESOLINE) injection 10 mg, 10 mg, Intravenous, Q6H PRN       Vitals :     Vitals:    07/23/21 1102   BP: 120/75   Pulse: 83   Resp: 18   Temp: 98.7 °F (37.1 °C)   SpO2: 98%       I & O :       Intake/Output Summary (Last 24 hours) at 7/23/2021 1124  Last data filed at 7/23/2021 0433  Gross per 24 hour   Intake 0 ml   Output 1200 ml   Net -1200 ml        Physical Examination :     General appearance: Anxious- no, distressed- no, in good spirits-  No, symmetric face  HEENT: Lips- normal, teeth- ok , oral mucosa- moist  Neck : Mass- no, appears symmetrical, JVD- not visible  Respiratory: Respiratory effort-normal, wheeze- no, crackles -none  Cardiovascular:  Ausculation- No M/R/G, Edema none  Abdomen: visible mass- no, distention- no, scar- no, tenderness- no                            hepatosplenomegaly-  no  Musculoskeletal:  clubbing no,cyanosis- no , digital ischemia- no                           muscle strength- grossly normal , tone - grossly normal  Skin: rashes- no , ulcers- no, induration- no, tightening - no  Psychiatric:  Judgement and insight- normal           AAO X 3  Additional finding:-     LABS:     Recent Labs

## 2021-07-23 NOTE — PROGRESS NOTES
Speech Language Pathology  Facility/Department: [facility] B3 - MED SURG  Dysphagia Daily Treatment Note      Recommendations:  Solid Consistency: Continue Dysphagia II (Minced and moist) diet  Liquid Consistency: Thin liquids, straws OK  Medication: crushed in puree  Consider completion of MBSS to instrumentally assess the pharyngeal phase of the swallow and correlate clinical findings      NAME: Erick Jackson  : 1950  MRN: [id number]    Patient Diagnosis(es):   Patient Active Problem List    Diagnosis Date Noted    Headache 2021    Intractable nausea and vomiting 2021    History of oral cancer 2021    Dysphagia 2021    Moderate protein-calorie malnutrition ([facility] [address] 2017    Nondisplaced unspecified condyle fracture of lower end of left femur, initial encounter for closed fracture ([facility] [address] 10/31/2017    Alcohol abuse 10/31/2017    Lactic acidosis 10/31/2017    Hyponatremia 10/31/2017     Allergies: Allergies   Allergen Reactions    Morphine      Subjective: 70year old male admitted on 21 with \"headache. \" Pt is s/p partial glossectomy and mandibulectomy (about seven years ago in ) followed by radiation therapy. Pt seen upright in bed, alert and agreeable to therapy, RN OK'd SLP entry and therapy. Pt NPO prior to f/u. Pt seen by ST yesterday with recommendation for Dysphagia II (minced and moist) diet with thin liquids -- RN aware. *SLP relayed that ST unable to modify diet orders when pt is NPO. Order then modified by RN from NPO to Dysphagia II in EMR. Pain: no c/o pain    Current Diet: ADULT DIET; Dysphagia - Minced and Moist    Diet Tolerance:  Patient tolerating current diet level without signs/symptoms of penetration / aspiration. P.O. Trials: Thin   x Multiple straw sips   Puree   x Pt declined   Solid         Dysphagia Treatment and Impressions:  Pt is asleep in bed initially but awakens easily with verbal stim.   Noted thick yellow mucous along roof of mouth, as well as, white secretions across lingual surface. SLP used Yankauer and moistened toothettes to successfully clear oral secretions. RR 20/min with O2 sat of 97-98% on RA. Poor lingual lateralization to left. Pt observed with thin liquid trials via straw, poor labial seal formed. Instances of delayed throat clear noted. No anterior loss. Noted CXR this admission with findings of:  Impression   No radiographic evidence of acute pulmonary disease. No instances of wet vocal quality or change in RR / O2 sats throughout. Pt endorsed \"burning\" sensation near sternal notch area post-swallow. Noted GI following, pt s/p esophagram.   Recommend continuing pt's current diet with consideration of MBSS to instrumentally assess the pharyngeal phase of the swallow and correlate clinical findings. ST to continue to follow. Dysphagia Goals:  Timeframe for Long-term Goals: 7 days (7/28/21)  Goal 1: The pt will tolerate safest and least restrictive diet without clinical s/s of aspiration. Today, 07/23: Progressing. Ongoing. Short-term Goals  Timeframe for Short-term Goals: 5 days (7/26/21)  1. The patient will tolerate recommended diet without observed clinical signs of aspiration, Today, 07/23: Progressing. Ongoing. 2. The patient/caregiver will demonstrate understanding of compensatory strategies for improved swallowing safety. , Today, 07/23: Progressing. Ongoing. 3. The patient will tolerate instrumental swallowing procedure, Will complete in the future as clinically indicated  4. The patient will tolerate repeat BSE when able. Goal met      Recommendations:  Solid Consistency: Continue Dysphagia II (Minced and moist) diet  Liquid Consistency: Thin liquids, straws OK  Medication: crushed in puree    Patient/Family/Caregiver Education:  SLP re: rationale for modified diet recommendation, importance of oral care 2-3x/day.   Pt verbalized understanding, would benefit from ongoing

## 2021-07-23 NOTE — PROGRESS NOTES
Hospitalist Progress Note      PCP: No primary care provider on file. Date of Admission: 7/20/2021    Chief Complaint:   Headache/ dysphagia    Hospital Course:   Ramon Wylie a 70 y. o. male with a history of head and neck cancer-apparently in remission. Has had jaw surgery. He was admitted for intractable headache as well as dysphagia to solids more than liquids. He has a history of partial glossectomy and mandibulectomy about seven years ago in 2014 followed by radiation therapy. Sary Angelique states he completed five years of regular follow up and monitoring thereafter. Tyra Harmon does not seem he has gotten much medical care aside from that. [facility] does not take any prescribed medications. His headache is mostly in the temporal region on the left side more so in the last 3 days. He denies any visual symptoms but has photophobia. He has associated nausea and vomiting. Has had dysphagia to solids as well but needs work-up. He had an esophagogram this morning suggestive of esophagitis with impaired peristalsis.       Subjective:   Headache reduced slightly  Denies nausea or vomiting  No visual symptoms      Medications:  Reviewed    Infusion Medications    dextrose 5 % and 0.45 % NaCl 30 mL/hr at 07/22/21 1505    sodium chloride       Scheduled Medications    potassium chloride  40 mEq Oral Q4H    cloNIDine  1 patch Transdermal Weekly    pantoprazole  40 mg Intravenous Daily    enoxaparin  40 mg Subcutaneous Daily     PRN Meds: butalbital-acetaminophen-caffeine, metoprolol, promethazine, promethazine, sodium chloride flush, sodium chloride, potassium chloride **OR** potassium alternative oral replacement **OR** potassium chloride, magnesium sulfate, acetaminophen **OR** acetaminophen, hydrALAZINE      Intake/Output Summary (Last 24 hours) at 7/23/2021 1246  Last data filed at 7/23/2021 0433  Gross per 24 hour   Intake 0 ml   Output 1200 ml   Net -1200 ml       Physical Exam Performed:    /75   Pulse 83   Temp 98.7 °F (37.1 °C) (Axillary)   Resp 18   Ht 5' 9\" (1.753 m)   Wt 159 lb 14.4 oz (72.5 kg)   SpO2 98%   BMI 23.61 kg/m²     General appearance: No apparent distress, appears stated age and cooperative. HEENT: Glossectomised, eccentric/ exposed leonor/dry. Pupils equal, round, and reactive to light. Conjunctivae/corneas clear. Neck: Supple, with full range of motion. No jugular venous distention. Trachea midline. Respiratory:  Normal respiratory effort. Clear to auscultation, bilaterally without Rales/Wheezes/Rhonchi. Cardiovascular: Regular rate and rhythm with normal S1/S2 without murmurs, rubs or gallops. Abdomen: Soft, non-tender, non-distended with normal bowel sounds. Musculoskeletal: No clubbing, cyanosis or edema bilaterally. Full range of motion without deformity. Skin: Skin color, texture, turgor normal.  No rashes or lesions. Neurologic:  Neurovascularly intact without any focal sensory/motor deficits. Cranial nerves: II-XII intact, grossly non-focal.  Psychiatric: Alert and oriented, thought content appropriate, normal insight  Capillary Refill: Brisk,3 seconds, normal   Peripheral Pulses: +2 palpable, equal bilaterally       Labs:   Recent Labs     07/21/21  0834 07/22/21  0748 07/23/21  0542   WBC 16.5* 13.9* 10.7   HGB 17.2 15.4 16.3   HCT 49.2 45.3 46.3    233 238     Recent Labs     07/22/21  0748 07/23/21  0019 07/23/21  0542    132* 132*   K 2.8* 2.7* 2.7*   CL 95* 94* 95*   CO2 27 25 26   BUN 9 9 10   CREATININE 0.6* <0.5* <0.5*   CALCIUM 9.5 9.4 9.4     Recent Labs     07/20/21  1705   AST 36   ALT 21   BILITOT 0.5   ALKPHOS 108     No results for input(s): INR in the last 72 hours.   Recent Labs     07/20/21  1705   TROPONINI <0.01       Urinalysis:      Lab Results   Component Value Date    NITRU Negative 07/20/2021    BLOODU Negative 07/20/2021    SPECGRAV 1.015 07/20/2021    GLUCOSEU Negative 07/20/2021    GLUCOSEU Neg 03/25/2010       Radiology:  VL Renal Arterial Duplex Complete         MRI BRAIN WO CONTRAST   Final Result   1. No acute intracranial abnormality. No acute infarct. 2. There is global parenchymal volume loss with mild chronic microvascular   ischemic changes. 3.  The ventricular dilatation is out of proportion to the cortical sulci. A   component of normal pressure hydrocephalus cannot be excluded. FL ESOPHAGRAM   Final Result   1. Suboptimal study due to patient's limited ability to cooperate. 2. Esophageal dysmotility with disordered primary peristalsis. 3. Findings suggestive of smooth luminal narrowing of the distal esophagus in   the region of the GE junction. Finding could be on the basis of reflux   esophagitis. Other etiologies cannot be excluded based on this examination. CT ABDOMEN PELVIS W IV CONTRAST Additional Contrast? None   Final Result   1. Wall thickening of the visualized esophagus. Infection versus   inflammation. Clinical follow-up recommended. 2. No appendicitis, diverticulitis, or small bowel obstruction. 3. Other findings as described. CT Head WO Contrast   Final Result   Age-related involutional changes with no acute intracranial abnormality. XR CHEST (2 VW)   Final Result   No radiographic evidence of acute pulmonary disease. ASSESSMENT & PLAN  1. Hypertensive urgency- resolved  -Cont Lopressor 5mg iv q6 prn for BP >160/95mmHg  -Continue on clonidine 0.1mg/d patch  -DC Clonidine patch if BP<110mmHg    2. Hypokalemia  -KCl 20mEq iv x 4 doses q4  -Check K level after 3rd dose  -Fluid resus    3. Dysphagia to solids more than liquids associated with Nausea/Vomiting. Esophagogram suggestive of reflux esophagitis/poor peristalsis.   - Continue fluid resuscitation   - GI consulted, plan for EGD today.   -Continue IV protonix.    -Use antiemetics prn.  Darling Dash request input from the GI service. Adriana Howard seems likely the patient has reflex esophagitis and possibly a peptic stricture.     4. Headache-bothersome. Unknown etiology yet.  MRI head suggestive of normal pressure hydrocephalus  -Consult Neurology  -Try Fioricet       DVT prophylaxis:        SCDs, lovenox  Code Status:               Full  Disposition:                 Inpatient     PT/OT Eval Status: Lovenox     Dispo -keep as an inpatient  [facility] MD RADHA

## 2021-07-23 NOTE — PLAN OF CARE
Problem: Nutrition  Goal: Optimal nutrition therapy  Outcome: Ongoing  Note: Nutrition Problem #1: Inadequate oral intake  Intervention: Food and/or Nutrient Delivery: Continue Current Diet, Start Oral Nutrition Supplement  Nutritional Goals:  Tolerate and consume 50% or greater of meals and ONS this admission w/o signs of aspiration

## 2021-07-23 NOTE — PLAN OF CARE
Problem: Falls - Risk of:  Goal: Will remain free from falls  Description: Will remain free from falls  Outcome: Ongoing     Problem: Skin Integrity:  Goal: Will show no infection signs and symptoms  Description: Will show no infection signs and symptoms  Outcome: Ongoing     Problem: SAFETY  Goal: Free from accidental physical injury  Outcome: Ongoing     Problem: DAILY CARE  Goal: Daily care needs are met  Outcome: Ongoing     Problem: PAIN  Goal: Patient's pain/discomfort is manageable  Outcome: Ongoing     Problem: SKIN INTEGRITY  Goal: Skin integrity is maintained or improved  Outcome: Ongoing     Problem: KNOWLEDGE DEFICIT  Goal: Patient/S.O. demonstrates understanding of disease process, treatment plan, medications, and discharge instructions.   Outcome: Ongoing

## 2021-07-23 NOTE — CONSULTS
In patient Neurology consult        [facility] Neurology      Ranjit Hammond MD      Keith Burtour  1950    Date of Service: 2021    Referring Physician: Jessica Ricci MD      Reason for the consult and CC: New onset headache and possible NPH    History was obtained from chart reviewing and discussion with the patient. I do not feel the patient is a good historian. HPI:   The patient is a 70y.o.  years old male with multiple medical problems chronic history of headache cancer status tongue resection with lymph node exploration and chemotherapy who was admitted to the hospital 2 days ago with new onset headache. Symptoms started few days ago. Description was left-sided dull achy pain. Degree was moderate but persistent. No visual changes or claudication. No fever chills or falling. No other relieving or aggravating factors. No weakness or numbness or tingling. Patient came in for evaluation. He was admitted. Initial work-up with CT head showed no acute findings. Patient had MRI of the brain yesterday which showed no acute stroke or abnormal lesion. There was concern of possible hydrocephalus or NPH. Blood pressure was elevated. Today he feels better. No more headaches. No weakness or numbness or tingling. Blood test reviewed and showed sodium 132 and potassium of 2.7. He walks with his walker. Denies any shuffling feet or freezing. Other review of system was unremarkable    Family history: Noncontributory    History reviewed. No pertinent family history.   Past Surgical History:   Procedure Laterality Date    EYE SURGERY Right 13    cataract    OTHER SURGICAL HISTORY      jaw cancer        Past Medical History:   Diagnosis Date    Cancer [facility])     jaw    Radiation     S/P chemotherapy, time since greater than 12 weeks      Social History     Tobacco Use    Smoking status: Former Smoker     Years: 40.00     Quit date: 2013     Years since quittin.5    Smokeless tobacco: Never Used   Substance Use Topics    Alcohol use: Yes     Comment: Multiple beers daily    Drug use: No     Allergies   Allergen Reactions    Morphine      Current Facility-Administered Medications   Medication Dose Route Frequency Provider Last Rate Last Admin    potassium chloride (KLOR-CON M) extended release tablet 20 mEq  20 mEq Oral PRN Yazmin Camacho MD        Or    potassium bicarb-citric acid (EFFER-K) effervescent tablet 40 mEq  40 mEq Oral PRN Yazmin Camacho MD        Or    potassium chloride 10 mEq/100 mL IVPB (Peripheral Line)  10 mEq Intravenous PRN Yazmin Camacho MD        potassium chloride 10 mEq/100 mL IVPB (Peripheral Line)  10 mEq Intravenous Q1H Yazmin Camacho MD        0.9% NaCl with KCl 40 mEq infusion   Intravenous Continuous Yazmin Camacho MD        butalbital-acetaminophen-caffeine (FIORICET, [facility]) per tablet 1 tablet  1 tablet Oral Q4H PRN Yazmin Camacho MD        metoprolol (LOPRESSOR) injection 5 mg  5 mg Intravenous Q6H PRN Yazmin Camacho MD   5 mg at 07/22/21 1030    cloNIDine (CATAPRES) 0.1 MG/24HR 1 patch  1 patch Transdermal Weekly Yazmin Camacho MD   1 patch at 07/22/21 1454    promethazine (PHENERGAN) injection 12.5 mg  12.5 mg Intravenous Q4H PRN Viky Acevedo MD   12.5 mg at 07/21/21 0523    promethazine (PHENERGAN) tablet 12.5 mg  12.5 mg Oral Q4H PRN Viky Acevedo MD        pantoprazole (PROTONIX) injection 40 mg  40 mg Intravenous Daily Viky Acevedo MD   40 mg at 07/23/21 1054    sodium chloride flush 0.9 % injection 10 mL  10 mL Intravenous PRN Viky Acevedo MD   10 mL at 07/22/21 1035    0.9 % sodium chloride infusion  25 mL Intravenous PRN Viky Acevedo MD        magnesium sulfate 1000 mg in dextrose 5% 100 mL IVPB  1,000 mg Intravenous PRN Viky Acevedo MD        acetaminophen (TYLENOL) tablet 650 mg  650 mg Oral Q6H PRN Viky Acevedo MD Or    acetaminophen (TYLENOL) suppository 650 mg  650 mg Rectal Q6H PRN Lola Tyler MD        enoxaparin (LOVENOX) injection 40 mg  40 mg Subcutaneous Daily Lola Tyler MD   40 mg at 07/23/21 1054    hydrALAZINE (APRESOLINE) injection 10 mg  10 mg Intravenous Q6H PRN Elizabeth Mack MD   10 mg at 07/22/21 0846       ROS : A 10-14 system review of constitutional, cardiovascular, respiratory, eyes, musculoskeletal, endocrine, GI, ENT, skin, hematological, genitourinary, psychiatric and neurologic systems was obtained and updated today and is unremarkable except as mentioned in my HPI      Exam:     Constitutional:   Vitals:    07/23/21 0800 07/23/21 1102 07/23/21 1456 07/23/21 1515   BP: (!) 128/93 120/75  (!) 143/100   Pulse: 85 83  77   Resp: 18 18  18   Temp: 97.4 °F (36.3 °C) 98.7 °F (37.1 °C)  97.9 °F (36.6 °C)   TempSrc: Axillary Axillary  Oral   SpO2:  98%  98%   Weight:       Height:   5' 9\" (1.753 m)        General appearance:  appear in no acute distress. Eye:  Fundus of the eye: Funduscopic examination cannot be performed due to COVID19 restrictions and precautions. Neck: Muscle atrophy and full range of motion  Cardiovascular: No lower leg edema with good pulsation. Mental Status:   Oriented to person, place, problem, and time. Memory: Good immediate recall. Intact remote memory  Normal attention span and concentration. Language: Dysarthric  Good fund of Knowledge. Aware of current events and vocabulary   Cranial Nerves:   II: Visual fields: Full. Pupils: equal, round, reactive to light  III,IV,VI: Extra Ocular Movements are intact. No nystagmus  V: Facial sensation is intact   VII: Facial strength and movements: intact and symmetric  VIII: Hearing: Intact  IX: Palate elevation is symmetric  XI: Shoulder shrug is intact  XII: Tongue movements are normal  Musculoskeletal: No focal weakness. .   Tone: Normal tone.    Reflexes: 1+ throughout  Planters: flexor bilaterally. Coordination: no pronator drift, no dysmetria with FNF in upper extremities. Normal REM. Sensation: normal to all modalities in both arms and legs. Gait/Posture: Not tested due to poor cooperation     Data:  LABS:   Lab Results   Component Value Date     07/23/2021    K 3.1 07/23/2021    CL 95 07/23/2021    CO2 26 07/23/2021    BUN 10 07/23/2021    CREATININE <0.5 07/23/2021    GFRAA >60 07/23/2021    GFRAA >60 03/25/2010    LABGLOM >60 07/23/2021    GLUCOSE 130 07/23/2021    PHOS 2.6 11/02/2017    MG 2.00 07/23/2021    CALCIUM 9.4 07/23/2021     Lab Results   Component Value Date    WBC 10.7 07/23/2021    RBC 4.92 07/23/2021    HGB 16.3 07/23/2021    HCT 46.3 07/23/2021    MCV 94.0 07/23/2021    RDW 13.3 07/23/2021     07/23/2021     Lab Results   Component Value Date    INR 1.07 11/01/2017    PROTIME 12.1 11/01/2017       Neuroimaging were independently reviewed by me and discussed results with the patient  Reviewed notes from different physicians  Reviewed lab and blood testing    Impression:  New onset headache seems to be improving. Could be secondary to hypertensive urgency or metabolic encephalopathy with hyponatremia and dehydration. Abnormal MRI concerning for NPH. So far he does not clinical features of such disease. If there is still concern, can be addressed in outpatient setting with [facility] to consider CSF flow study. History of head and neck cancer  Hypertension, not controlled  Malnutrition    Recommendation:  Continue current supportive care  Hydration  Repeat potassium and follow sodium level  DVT and GI prophylaxis  Continue current blood pressure medication  Pain control  PT OT  Telemetry  DVT and GI prophylaxis  Fioricet as needed  Can be discharged from neurology once medically stable  No further recommendation   I will sign off      Thank you for referring such patient.  If you have any questions regarding my consult note, please don't hesitate to call me.     Lissy Dominguez MD  [phone]    This dictation was generated by voice recognition computer software.  Although all attempts are made to edit the dictation for accuracy, there may be errors in the  transcription that are not intended

## 2021-07-23 NOTE — PROGRESS NOTES
Comprehensive Nutrition Assessment    Type and Reason for Visit:  Initial, Positive Nutrition Screen (MST=2: weight loss, decreased appetite)    Nutrition Recommendations/Plan:   1. Continue general diet - textures and consistencies per SLP   2. Add Ensure TID - flavor preferences added   3. Encourage PO intakes   4. Monitor nutrition adequacy, pertinent labs, bowel habits, wt changes, and clinical progress    Nutrition Assessment:  69 yo male admitted with headache and dysphagia. Hx of head and neck cancer s/p partial glassectomy and mandibulectomy several years ago. SLP consulted and recommend minced and moist diet with thin liquids, advanced this afternoon. Denies any difficulties chewing/swallowing current diet level. Pt reports no appetite for several years. Pt does not know UBW, GENARO weight loss. Pt does drink ONS at home BID, will add TID to promote nutrition. Encouraged PO intakes as tolerated. Will continue to monitor. Malnutrition Assessment:  Malnutrition Status: At risk for malnutrition (Comment)    Context:  Chronic Illness       Estimated Daily Nutrient Needs:  Energy (kcal):  5715-4627 kcal; Weight Used for Energy Requirements:  Current (72 kg)     Protein (g):  72-87 g; Weight Used for Protein Requirements:  Current (1.0-1.2 g/kg)        Fluid (ml/day):   ; Method Used for Fluid Requirements:  1 ml/kcal      Nutrition Related Findings:  Na 132. K 3.1. Wounds:  None       Current Nutrition Therapies:    ADULT DIET; Dysphagia - Minced and Moist    Anthropometric Measures:  · Height: 5' 9\" (175.3 cm)  · Current Body Weight: 159 lb (72.1 kg)   · Ideal Body Weight: 160 lbs; % Ideal Body Weight 99.4 %   · BMI: 23.5  · BMI Categories: Normal Weight (BMI 18.5-24. 9)       Nutrition Diagnosis:   · Inadequate oral intake related to inadequate protein-energy intake, partial or complete edentulism as evidenced by poor intake prior to admission, swallow study results      Nutrition Interventions: Food and/or Nutrient Delivery:  Continue Current Diet, Start Oral Nutrition Supplement  Nutrition Education/Counseling:  No recommendation at this time   Coordination of Nutrition Care:  Continue to monitor while inpatient, Speech Therapy    Goals: Tolerate and consume 50% or greater of meals and ONS this admission w/o signs of aspiration       Nutrition Monitoring and Evaluation:   Behavioral-Environmental Outcomes:  None Identified   Food/Nutrient Intake Outcomes:  Diet Advancement/Tolerance, Food and Nutrient Intake, Supplement Intake  Physical Signs/Symptoms Outcomes:  Biochemical Data, Chewing or Swallowing, Nutrition Focused Physical Findings, Weight     Discharge Planning:     Too soon to determine     Electronically signed by Carmita Anna MS, RD, LD on 7/23/21 at 3:06 PM EDT    Contact: [phone]

## 2021-07-24 LAB
ALDOSTERONE: 4.7 NG/DL
ANION GAP SERPL CALCULATED.3IONS-SCNC: 12 MMOL/L (ref 3–16)
BASOPHILS ABSOLUTE: 0 K/UL (ref 0–0.2)
BASOPHILS RELATIVE PERCENT: 0.4 %
BUN BLDV-MCNC: 16 MG/DL (ref 7–20)
CALCIUM SERPL-MCNC: 9.1 MG/DL (ref 8.3–10.6)
CHLORIDE BLD-SCNC: 97 MMOL/L (ref 99–110)
CO2: 25 MMOL/L (ref 21–32)
CREAT SERPL-MCNC: 0.7 MG/DL (ref 0.8–1.3)
EOSINOPHILS ABSOLUTE: 0.1 K/UL (ref 0–0.6)
EOSINOPHILS RELATIVE PERCENT: 1 %
GFR AFRICAN AMERICAN: >60
GFR NON-AFRICAN AMERICAN: >60
GLUCOSE BLD-MCNC: 121 MG/DL (ref 70–99)
GLUCOSE BLD-MCNC: 129 MG/DL (ref 70–99)
GLUCOSE BLD-MCNC: 93 MG/DL (ref 70–99)
GLUCOSE BLD-MCNC: 96 MG/DL (ref 70–99)
GLUCOSE BLD-MCNC: 98 MG/DL (ref 70–99)
GLUCOSE BLD-MCNC: 99 MG/DL (ref 70–99)
HCT VFR BLD CALC: 44.7 % (ref 40.5–52.5)
HEMOGLOBIN: 15.5 G/DL (ref 13.5–17.5)
LYMPHOCYTES ABSOLUTE: 1.8 K/UL (ref 1–5.1)
LYMPHOCYTES RELATIVE PERCENT: 20 %
MAGNESIUM: 2.2 MG/DL (ref 1.8–2.4)
MCH RBC QN AUTO: 32.7 PG (ref 26–34)
MCHC RBC AUTO-ENTMCNC: 34.8 G/DL (ref 31–36)
MCV RBC AUTO: 93.9 FL (ref 80–100)
MONOCYTES ABSOLUTE: 0.9 K/UL (ref 0–1.3)
MONOCYTES RELATIVE PERCENT: 10 %
NEUTROPHILS ABSOLUTE: 6.1 K/UL (ref 1.7–7.7)
NEUTROPHILS RELATIVE PERCENT: 68.6 %
PDW BLD-RTO: 13.4 % (ref 12.4–15.4)
PERFORMED ON: ABNORMAL
PERFORMED ON: ABNORMAL
PERFORMED ON: NORMAL
PLATELET # BLD: 228 K/UL (ref 135–450)
PMV BLD AUTO: 6.4 FL (ref 5–10.5)
POTASSIUM SERPL-SCNC: 3.3 MMOL/L (ref 3.5–5.1)
RBC # BLD: 4.75 M/UL (ref 4.2–5.9)
SODIUM BLD-SCNC: 134 MMOL/L (ref 136–145)
WBC # BLD: 8.9 K/UL (ref 4–11)

## 2021-07-24 PROCEDURE — 1200000000 HC SEMI PRIVATE

## 2021-07-24 PROCEDURE — C9113 INJ PANTOPRAZOLE SODIUM, VIA: HCPCS | Performed by: INTERNAL MEDICINE

## 2021-07-24 PROCEDURE — 6360000002 HC RX W HCPCS: Performed by: INTERNAL MEDICINE

## 2021-07-24 PROCEDURE — 85025 COMPLETE CBC W/AUTO DIFF WBC: CPT

## 2021-07-24 PROCEDURE — 6370000000 HC RX 637 (ALT 250 FOR IP): Performed by: INTERNAL MEDICINE

## 2021-07-24 PROCEDURE — 83735 ASSAY OF MAGNESIUM: CPT

## 2021-07-24 PROCEDURE — 2580000003 HC RX 258: Performed by: INTERNAL MEDICINE

## 2021-07-24 PROCEDURE — 36415 COLL VENOUS BLD VENIPUNCTURE: CPT

## 2021-07-24 PROCEDURE — 80048 BASIC METABOLIC PNL TOTAL CA: CPT

## 2021-07-24 PROCEDURE — 92526 ORAL FUNCTION THERAPY: CPT

## 2021-07-24 RX ORDER — SODIUM CHLORIDE 9 MG/ML
INJECTION, SOLUTION INTRAVENOUS CONTINUOUS
Status: DISCONTINUED | OUTPATIENT
Start: 2021-07-24 | End: 2021-07-24 | Stop reason: ALTCHOICE

## 2021-07-24 RX ADMIN — SODIUM CHLORIDE: 9 INJECTION, SOLUTION INTRAVENOUS at 06:17

## 2021-07-24 RX ADMIN — POTASSIUM CHLORIDE AND SODIUM CHLORIDE: 900; 300 INJECTION, SOLUTION INTRAVENOUS at 10:19

## 2021-07-24 RX ADMIN — ENOXAPARIN SODIUM 40 MG: 40 INJECTION SUBCUTANEOUS at 09:46

## 2021-07-24 RX ADMIN — PANTOPRAZOLE SODIUM 40 MG: 40 INJECTION, POWDER, FOR SOLUTION INTRAVENOUS at 09:46

## 2021-07-24 RX ADMIN — POTASSIUM BICARBONATE 40 MEQ: 782 TABLET, EFFERVESCENT ORAL at 16:20

## 2021-07-24 ASSESSMENT — PAIN SCALES - GENERAL
PAINLEVEL_OUTOF10: 0

## 2021-07-24 NOTE — PROGRESS NOTES
Speech Language Pathology  Facility/Department: [facility] B3 - MED SURG  Dysphagia Daily Treatment Note      Recommendations:  Solid Consistency: Continue Dysphagia II (Minced and moist) diet  Liquid Consistency: Thin liquids, straws OK  Medication: crushed in puree  Consider completion of MBSS to instrumentally assess the pharyngeal phase of the swallow and correlate clinical findings      NAME: Dacia Cruz  : 1950  MRN: [id number]    Patient Diagnosis(es):   Patient Active Problem List    Diagnosis Date Noted    New persistent daily headache     NPH (normal pressure hydrocephalus) ([facility] [address] [facility]     HTN (hypertension), benign     Headache 2021    Intractable nausea and vomiting 2021    History of oral cancer 2021    Dysphagia 2021    Moderate protein-calorie malnutrition ([facility] [address] 2017    Nondisplaced unspecified condyle fracture of lower end of left femur, initial encounter for closed fracture ([facility] [address] 10/31/2017    Alcohol abuse 10/31/2017    Lactic acidosis 10/31/2017    Hyponatremia 10/31/2017     Allergies: Allergies   Allergen Reactions    Morphine      Subjective: 70year old male admitted on 21 with \"headache. \" Pt is s/p partial glossectomy and mandibulectomy (about seven years ago in ) followed by radiation therapy. Pt seen upright in bed, alert and agreeable to therapy, RN OK'd SLP entry and therapy. Pt NPO prior to f/u. Pt seen by ST yesterday with recommendation for Dysphagia II (minced and moist) diet with thin liquids -- RN aware. *SLP relayed that ST unable to modify diet orders when pt is NPO. Order then modified by RN from NPO to Dysphagia II in EMR. Pain: no c/o pain    Current Diet: ADULT DIET; Dysphagia - Minced and Moist  Adult Oral Nutrition Supplement; Standard High Calorie/High Protein Oral Supplement  Diet NPO    Diet Tolerance:  Patient tolerating current diet level without signs/symptoms of penetration / aspiration.     P.O. Trials: Thin   x Multiple straw sips   Puree   x Pt declined   Solid         Dysphagia Treatment and Impressions:  Patient upright in chair, family member at bedside  Oral cavity clear, dry- xerostomia   RR 20/min, room air  Poor lingual lateralization to left. Discussed oral rinse of part peroxide  And water, patient reports he does this at home. ETOH free mouth wash provided for oral rinse. Pt observed with thin liquid trials via straw, no labial seal formed. Seal with upper lip and lingual surface utilized. No anterior loss, no overt clinical indicators of aspiration,   Noted CXR this admission with findings of:  Impression   No radiographic evidence of acute pulmonary disease. Recommend continue with MBS (after EGD mOnday?). Continue SLP intervention for development of dysphaiga maintenance program.   Recommend continuing pt's current diet with consideration of MBSS to instrumentally assess the pharyngeal phase of the swallow and correlate clinical findings. ST to continue to follow. Dysphagia Goals:  Timeframe for Long-term Goals: 7 days (7/28/21)  Goal 1: The pt will tolerate safest and least restrictive diet without clinical s/s of aspiration. Today, 07/24: Progressing. Ongoing. Short-term Goals  Timeframe for Short-term Goals: 5 days (7/26/21)  1. The patient will tolerate recommended diet without observed clinical signs of aspiration, Today, 07/24: Progressing. Ongoing. 2. The patient/caregiver will demonstrate understanding of compensatory strategies for improved swallowing safety. , Today, 07/24: Progressing. Ongoing. 3. The patient will tolerate instrumental swallowing procedure, Will complete in the future as clinically indicated  4. The patient will tolerate repeat BSE when able. Goal met      Recommendations:  Solid Consistency: Continue Dysphagia II (Minced and moist) diet  Liquid Consistency:  Thin liquids, straws OK  Medication: crushed in puree    Patient/Family/Caregiver Education:  SLP re: rationale for modified diet recommendation, importance of oral care 2-3x/day. Pt verbalized understanding, would benefit from ongoing reinforcement. Compensatory Strategies:   Small bites/sips  Upright for all po intake  Straws with liquids are OK  Alternate solids and liquids    Plan:    Continued Dysphagia treatment with goals per plan of care. Discharge Recommendations: defer to PT/OT    If pt discharges from hospital prior to Speech/Swallowing discharge, this note serves as tx and discharge summary. Total Treatment Time / Charges     Time in Time out Total Time / units   Cognitive Tx         Speech Tx      Dysphagia Tx 1428 1442  14min / 1 unit     Signature:   Vu Morrissey MS CCC-SLP  [place] .[id number]  Speech Language Pathologist  7/24/2021

## 2021-07-24 NOTE — PROGRESS NOTES
[address] [facility],  [address] [place] [address] Ave  [place], [facility]  Phone: [address] He is a Single [1] White (non-) [1] 70 y.o. . male      Main Problems/Chief Complaint for which GI service is seeing pt     Dysphagia    Clinical Summary      Pt is able to swallow liquids, but not solids. PAST MEDICAL HISTORY     Past Medical History:   Diagnosis Date    Cancer [facility])     jaw    Radiation     S/P chemotherapy, time since greater than 12 weeks      FAMILY HISTORY   History reviewed. No pertinent family history. SURGICAL HISTORY     Past Surgical History:   Procedure Laterality Date    EYE SURGERY Right 12/26/13    cataract    OTHER SURGICAL HISTORY      jaw cancer     CURRENT MEDICATIONS   (This list may include medications prescribed during this encounter as epic can not insert only the list prior to this encounter.)      ALLERGIES     Allergies   Allergen Reactions    Morphine        REVIEW OF SYSTEMS   No chest pain suspicious for cardiac origin  SOB    PHYSICAL EXAM   Vitals as per nursing record. Deformity of mouth S/P surgery for CA. Neurologic: Alert & oriented x 3. Psych: Good mood and memory. Pt is able to make appropriate decisions. FINAL IMPRESSION AND RECOMMENDATIONS     The Ba study has shown narrowing in the distal esophagus. There is suspicion of deformity secondary to the surgery playing a role in causing dysphagia, but if a treatable cause is present, then it is worth investigating it and treating it. An EGD and for possible dilation is recommended. EGD with Bx and possible dilation, cauterization and other interventions during the procedure as needed is recommended. The patient is explained the above procedure(s) in simple words along with its need, risks, benefits and alternatives.  The risks explained to the patient included, but are not limited to, bleeding, perforation, infection, suppression of breathing, heart attack, stroke, need for hospitalization and/or surgery and remotely even death. The fact that the intended procedure may not be completed under certain circumstances and that in spite of our intention to keep the patient as comfortable as possible, there is some possibility of the patient experiencing discomfort or pain during and after the procedure remains. All the risks put together account for  0.03% of cases, but in his case - 1% with specifically more risk of perforation and bleeding. The prognosis is explained if the procedure is not done. The medical condition(s) which increase the risks of the procedure as explained to the patient are: S/P surgery and radiation for oral CA and multiple other conditions listed on problem list.    The patient has voiced the understanding of the above and has been given opportunity to ask questions. No question was left unanswered. The informed consent for the above procedure(s) is obtained. The total time spent face-to-face, review of the record and on the claudio during this visit was 25 minutes with more than 50% of the time spent in review of the electronic record and finding the narrowing the distal esophagus and coordination of care for an EGD and and counseling the patient for EGD and for possible dilation            Lady Robertson MD 7/23/21 9:22 PM EDT    CC:  No primary care provider on file. IMPORTANT: Please note that some portions of this note may have been created using Dragon voice recognition software. Some \"sound-alike\" and totally wrong word substitutions may have taken place due to known inherent limitations of any such software, including this voice recognition software. In spite of efforts to eliminate such errors, some may not have been corrected. So please read the note with this in mind and recognize such mistakes and understand the correct version using the  context. Thanks.

## 2021-07-24 NOTE — PROGRESS NOTES
Pt with no UOP this shift. Denies symptoms. Bladder not overly distended on assessment. Bladder scan shows 409 ml. Secure message sent to Dr. Mary Kayser.

## 2021-07-24 NOTE — PROGRESS NOTES
[facility]    [facility]              [phone]                        Interval History and plan:      His blood pressure is better  Had drop in urine output  Creatinine and BUN is a stable  Potassium low      Plan:  No change in BP medication  Current blood pressure is appropriate  Potassium supplement ordered                   Assessment :     Hypertension - emergency  BP: (146)/(90)  Pulse:  [80]   BP goal inpatient [phone] systolic inpatient  Severe  Peak was 205/130 on admission coming down significantly though is still on high side at the time of consult  UA-bland  CT abdomen on 7/21-kidneys unremarkable, unremarkable adrenal glands  Renal function is normal    Hypokalemia  Likely due to nausea and vomiting      [facility] would like to thank Raj Huggins MD   for opportunity to serve this patient      Please call with questions at-   24 Hrs Answering service [phone] or  7 am- 5 pm via Perfect serve or cell phone  Antoni Chavez MD          CC/reason for consult :     Hypertension urgency     HPI :     Supa No is a 70 y.o. male presented to   the hospital on 7/20/2021 with left-sided headache nausea and vomiting. He has dysarthria and unable to provide much details. Per report she also has syncopal episodes several times over the last 7 days. 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 He also has dysphagia because of which he is unable to take p.o. we will continue p.o. medications. He is not known to be on any medication at home. He is currently n.p.o. and getting evaluated for dysphagia. He is noted to have severe hypertension which has been difficult to control. He is also complaining dizziness and headache for which he has had CT scan of the brain done and also MRI. He has chronic microvascular ischemic changes normal pressure hydrocephalus cannot be excluded.     We are consulted injection 5 mg, 5 mg, Intravenous, Q6H PRN  cloNIDine (CATAPRES) 0.1 MG/24HR 1 patch, 1 patch, Transdermal, Weekly  promethazine (PHENERGAN) injection 12.5 mg, 12.5 mg, Intravenous, Q4H PRN  promethazine (PHENERGAN) tablet 12.5 mg, 12.5 mg, Oral, Q4H PRN  pantoprazole (PROTONIX) injection 40 mg, 40 mg, Intravenous, Daily  sodium chloride flush 0.9 % injection 10 mL, 10 mL, Intravenous, PRN  0.9 % sodium chloride infusion, 25 mL, Intravenous, PRN  magnesium sulfate 1000 mg in dextrose 5% 100 mL IVPB, 1,000 mg, Intravenous, PRN  acetaminophen (TYLENOL) tablet 650 mg, 650 mg, Oral, Q6H PRN **OR** acetaminophen (TYLENOL) suppository 650 mg, 650 mg, Rectal, Q6H PRN  enoxaparin (LOVENOX) injection 40 mg, 40 mg, Subcutaneous, Daily  hydrALAZINE (APRESOLINE) injection 10 mg, 10 mg, Intravenous, Q6H PRN       Vitals :     Vitals:    07/24/21 0930   BP: (!) 146/90   Pulse: 80   Resp: 18   Temp: 98 °F (36.7 °C)   SpO2: 98%       I & O :       Intake/Output Summary (Last 24 hours) at 7/24/2021 1154  Last data filed at 7/24/2021 1050  Gross per 24 hour   Intake 1282.4 ml   Output 800 ml   Net 482.4 ml        Physical Examination :     General appearance: Anxious- no, distressed- no, in good spirits-  No, symmetric face  HEENT: Lips- normal, teeth- ok , oral mucosa- moist  Neck : Mass- no, appears symmetrical, JVD- not visible  Respiratory: Respiratory effort-normal, wheeze- no, crackles -none  Cardiovascular:  Ausculation- No M/R/G, Edema none  Abdomen: visible mass- no, distention- no, scar- no, tenderness- no                            hepatosplenomegaly-  no  Musculoskeletal:  clubbing no,cyanosis- no , digital ischemia- no                           muscle strength- grossly normal , tone - grossly normal  Skin: rashes- no , ulcers- no, induration- no, tightening - no  Psychiatric:  Judgement and insight- normal           AAO X 3  Additional finding:-     LABS:     Recent Labs     07/22/21  0748 07/23/21  0542 07/24/21  0600 WBC 13.9* 10.7 8.9   HGB 15.4 16.3 15.5   HCT 45.3 46.3 44.7    238 228     Recent Labs     07/22/21  0748 07/22/21  0748 07/23/21  0019 07/23/21  0019 07/23/21  0542 07/23/21  1412 07/24/21  0600      < > 132*  --  132*  --  134*   K 2.8*   < > 2.7*   < > 2.7* 3.1* 3.3*   CL 95*   < > 94*  --  95*  --  97*   CO2 27   < > 25  --  26  --  25   BUN 9   < > 9  --  10  --  16   CREATININE 0.6*   < > <0.5*  --  <0.5*  --  0.7*   GLUCOSE 119*   < > 123*  --  130*  --  98   MG 2.00  --   --   --  2.00  --  2.20    < > = values in this interval not displayed.

## 2021-07-24 NOTE — PROGRESS NOTES
Hospitalist Progress Note      PCP: No primary care provider on file. Date of Admission: 7/20/2021    Chief Complaint:   Headache/ dysphagia    Hospital Course:   Snehal Conte a 70 y. o. male with a history of head and neck cancer-apparently in remission. Has had jaw surgery. He was admitted for intractable headache as well as dysphagia to solids more than liquids. He has a history of partial glossectomy and mandibulectomy about seven years ago in 2014 followed by radiation therapy. Kimberly Henao states he completed five years of regular follow up and monitoring thereafter. Sony Mike does not seem he has gotten much medical care aside from that. [facility] does not take any prescribed medications. His headache is mostly in the temporal region on the left side more so in the last 3 days. He denies any visual symptoms but has photophobia. He has associated nausea and vomiting. Has had dysphagia to solids as well but needs work-up. He had an esophagogram this morning suggestive of esophagitis with impaired peristalsis.       Subjective:   Headache reduced slightly  Denies nausea or vomiting  No visual symptoms  Reduced urine output      Medications:  Reviewed    Infusion Medications    0.9% NaCl with KCl 40 mEq 50 mL/hr at 07/24/21 1212    sodium chloride       Scheduled Medications    cloNIDine  1 patch Transdermal Weekly    pantoprazole  40 mg Intravenous Daily    enoxaparin  40 mg Subcutaneous Daily     PRN Meds: potassium chloride **OR** potassium alternative oral replacement **OR** potassium chloride, butalbital-acetaminophen-caffeine, metoprolol, promethazine, promethazine, sodium chloride flush, sodium chloride, magnesium sulfate, acetaminophen **OR** acetaminophen, hydrALAZINE      Intake/Output Summary (Last 24 hours) at 7/24/2021 1218  Last data filed at 7/24/2021 1205  Gross per 24 hour   Intake 1282.4 ml   Output 1200 ml   Net 82.4 ml       Physical Exam Performed:    BP (!) 146/90   Pulse 80   Temp 98 °F (36.7 °C) (Oral)   Resp 18   Ht 5' 9\" (1.753 m)   Wt 159 lb 1.6 oz (72.2 kg)   SpO2 98%   BMI 23.49 kg/m²     General appearance: No apparent distress, appears stated age and cooperative. HEENT: Glossectomised, eccentric/ exposed leonor/dry. Pupils equal, round, and reactive to light. Conjunctivae/corneas clear. Neck: Supple, with full range of motion. No jugular venous distention. Trachea midline. Respiratory:  Normal respiratory effort. Clear to auscultation, bilaterally without Rales/Wheezes/Rhonchi. Cardiovascular: Regular rate and rhythm with normal S1/S2 without murmurs, rubs or gallops. Abdomen: Soft, non-tender, non-distended with normal bowel sounds. Musculoskeletal: No clubbing, cyanosis or edema bilaterally. Full range of motion without deformity. Skin: Skin color, texture, turgor normal.  No rashes or lesions. Neurologic:  Neurovascularly intact without any focal sensory/motor deficits. Cranial nerves: II-XII intact, grossly non-focal.  Psychiatric: Alert and oriented, thought content appropriate, normal insight  Capillary Refill: Brisk,3 seconds, normal   Peripheral Pulses: +2 palpable, equal bilaterally       Labs:   Recent Labs     07/22/21  0748 07/23/21  0542 07/24/21  0600   WBC 13.9* 10.7 8.9   HGB 15.4 16.3 15.5   HCT 45.3 46.3 44.7    238 228     Recent Labs     07/23/21  0019 07/23/21  0019 07/23/21  0542 07/23/21  1412 07/24/21  0600   *  --  132*  --  134*   K 2.7*   < > 2.7* 3.1* 3.3*   CL 94*  --  95*  --  97*   CO2 25  --  26  --  25   BUN 9  --  10  --  16   CREATININE <0.5*  --  <0.5*  --  0.7*   CALCIUM 9.4  --  9.4  --  9.1    < > = values in this interval not displayed. No results for input(s): AST, ALT, BILIDIR, BILITOT, ALKPHOS in the last 72 hours. No results for input(s): INR in the last 72 hours. No results for input(s): Joyice Woodway in the last 72 hours.     Urinalysis:      Lab Results   Component Value Date    NITRU Negative 07/20/2021 BLOODU Negative 07/20/2021    SPECGRAV 1.015 07/20/2021    GLUCOSEU Negative 07/20/2021    GLUCOSEU Neg 03/25/2010       Radiology:  VL Renal Arterial Duplex Complete   Final Result      MRI BRAIN WO CONTRAST   Final Result   1. No acute intracranial abnormality. No acute infarct. 2. There is global parenchymal volume loss with mild chronic microvascular   ischemic changes. 3.  The ventricular dilatation is out of proportion to the cortical sulci. A   component of normal pressure hydrocephalus cannot be excluded. FL ESOPHAGRAM   Final Result   1. Suboptimal study due to patient's limited ability to cooperate. 2. Esophageal dysmotility with disordered primary peristalsis. 3. Findings suggestive of smooth luminal narrowing of the distal esophagus in   the region of the GE junction. Finding could be on the basis of reflux   esophagitis. Other etiologies cannot be excluded based on this examination. CT ABDOMEN PELVIS W IV CONTRAST Additional Contrast? None   Final Result   1. Wall thickening of the visualized esophagus. Infection versus   inflammation. Clinical follow-up recommended. 2. No appendicitis, diverticulitis, or small bowel obstruction. 3. Other findings as described. CT Head WO Contrast   Final Result   Age-related involutional changes with no acute intracranial abnormality. XR CHEST (2 VW)   Final Result   No radiographic evidence of acute pulmonary disease. ASSESSMENT & PLAN  1. Hypertensive urgency- resolved  -Cont Lopressor 5mg iv q6 prn for BP >160/95mmHg  -Continue on clonidine 0.1mg/d patch  -DC Clonidine patch if BP<110mmHg    2. Hypokalemia, slow to correct  -Check mag  -KCl 20mEq iv x 4 doses q4  -Check K level after 3rd dose  -Fluid resus  -Monitor urine output    3. Dysphagia to solids more than liquids associated with Nausea/Vomiting. Esophagogram suggestive of reflux esophagitis/poor peristalsis.   - Continue fluid resuscitation   - GI consulted, plan for EGD may be Monday.   -Continue IV protonix.    -Use antiemetics prn.  Susan Read request input from the GI service. Sony Bers seems likely the patient has reflex esophagitis and possibly a peptic stricture.     4. Headache-bothersome. Unknown etiology yet. MRI head suggestive of normal pressure hydrocephalus. Resolved.   -Consult Neurology  -Continue Fioricet prn       DVT prophylaxis:        SCDs, lovenox  Code Status:               Full  Disposition:                 Inpatient     PT/OT Eval Status: Lovenox     Dispo -keep as an inpatient  [facility] MD RADHA

## 2021-07-25 LAB
ANION GAP SERPL CALCULATED.3IONS-SCNC: 10 MMOL/L (ref 3–16)
BASOPHILS ABSOLUTE: 0.1 K/UL (ref 0–0.2)
BASOPHILS RELATIVE PERCENT: 0.7 %
BUN BLDV-MCNC: 14 MG/DL (ref 7–20)
CALCIUM SERPL-MCNC: 8.7 MG/DL (ref 8.3–10.6)
CHLORIDE BLD-SCNC: 100 MMOL/L (ref 99–110)
CO2: 25 MMOL/L (ref 21–32)
CREAT SERPL-MCNC: 0.6 MG/DL (ref 0.8–1.3)
EOSINOPHILS ABSOLUTE: 0.2 K/UL (ref 0–0.6)
EOSINOPHILS RELATIVE PERCENT: 2.7 %
GFR AFRICAN AMERICAN: >60
GFR NON-AFRICAN AMERICAN: >60
GLUCOSE BLD-MCNC: 100 MG/DL (ref 70–99)
GLUCOSE BLD-MCNC: 101 MG/DL (ref 70–99)
GLUCOSE BLD-MCNC: 101 MG/DL (ref 70–99)
GLUCOSE BLD-MCNC: 129 MG/DL (ref 70–99)
GLUCOSE BLD-MCNC: 91 MG/DL (ref 70–99)
HCT VFR BLD CALC: 40.6 % (ref 40.5–52.5)
HEMOGLOBIN: 14 G/DL (ref 13.5–17.5)
LYMPHOCYTES ABSOLUTE: 1.8 K/UL (ref 1–5.1)
LYMPHOCYTES RELATIVE PERCENT: 22.6 %
MAGNESIUM: 1.9 MG/DL (ref 1.8–2.4)
MCH RBC QN AUTO: 32.1 PG (ref 26–34)
MCHC RBC AUTO-ENTMCNC: 34.5 G/DL (ref 31–36)
MCV RBC AUTO: 93.2 FL (ref 80–100)
MONOCYTES ABSOLUTE: 0.6 K/UL (ref 0–1.3)
MONOCYTES RELATIVE PERCENT: 8.2 %
NEUTROPHILS ABSOLUTE: 5.2 K/UL (ref 1.7–7.7)
NEUTROPHILS RELATIVE PERCENT: 65.8 %
PDW BLD-RTO: 13.4 % (ref 12.4–15.4)
PERFORMED ON: ABNORMAL
PERFORMED ON: NORMAL
PLATELET # BLD: 185 K/UL (ref 135–450)
PLATELET SLIDE REVIEW: ADEQUATE
PMV BLD AUTO: 7.8 FL (ref 5–10.5)
POTASSIUM SERPL-SCNC: 3.5 MMOL/L (ref 3.5–5.1)
RBC # BLD: 4.36 M/UL (ref 4.2–5.9)
SLIDE REVIEW: NORMAL
SODIUM BLD-SCNC: 135 MMOL/L (ref 136–145)
WBC # BLD: 7.9 K/UL (ref 4–11)

## 2021-07-25 PROCEDURE — 6370000000 HC RX 637 (ALT 250 FOR IP): Performed by: INTERNAL MEDICINE

## 2021-07-25 PROCEDURE — 83735 ASSAY OF MAGNESIUM: CPT

## 2021-07-25 PROCEDURE — 85025 COMPLETE CBC W/AUTO DIFF WBC: CPT

## 2021-07-25 PROCEDURE — 36415 COLL VENOUS BLD VENIPUNCTURE: CPT

## 2021-07-25 PROCEDURE — 2580000003 HC RX 258: Performed by: INTERNAL MEDICINE

## 2021-07-25 PROCEDURE — C9113 INJ PANTOPRAZOLE SODIUM, VIA: HCPCS | Performed by: INTERNAL MEDICINE

## 2021-07-25 PROCEDURE — 6360000002 HC RX W HCPCS: Performed by: INTERNAL MEDICINE

## 2021-07-25 PROCEDURE — 1200000000 HC SEMI PRIVATE

## 2021-07-25 PROCEDURE — 80048 BASIC METABOLIC PNL TOTAL CA: CPT

## 2021-07-25 RX ORDER — AMLODIPINE BESYLATE 5 MG/1
5 TABLET ORAL DAILY
Status: DISCONTINUED | OUTPATIENT
Start: 2021-07-25 | End: 2021-07-27 | Stop reason: HOSPADM

## 2021-07-25 RX ADMIN — PANTOPRAZOLE SODIUM 40 MG: 40 INJECTION, POWDER, FOR SOLUTION INTRAVENOUS at 10:33

## 2021-07-25 RX ADMIN — POTASSIUM CHLORIDE AND SODIUM CHLORIDE: 900; 300 INJECTION, SOLUTION INTRAVENOUS at 10:43

## 2021-07-25 RX ADMIN — Medication 10 ML: at 10:33

## 2021-07-25 RX ADMIN — AMLODIPINE BESYLATE 5 MG: 5 TABLET ORAL at 12:13

## 2021-07-25 ASSESSMENT — PAIN SCALES - GENERAL
PAINLEVEL_OUTOF10: 0

## 2021-07-25 NOTE — PLAN OF CARE
Resting quietly. No acute distress noted. Denies pain/discomfort. IV fluids infusing. Monitored frequently. Problem: Falls - Risk of:  Goal: Will remain free from falls  Description: Will remain free from falls  Outcome: Ongoing  Goal: Absence of physical injury  Description: Absence of physical injury  Outcome: Ongoing     Problem: Nausea/Vomiting:  Goal: Absence of nausea/vomiting  Description: Absence of nausea/vomiting  Outcome: Ongoing  Goal: Able to drink  Description: Able to drink  Outcome: Ongoing  Goal: Able to eat  Description: Able to eat  Outcome: Ongoing  Goal: Ability to achieve adequate nutritional intake will improve  Description: Ability to achieve adequate nutritional intake will improve  Outcome: Ongoing     Problem: Skin Integrity:  Goal: Will show no infection signs and symptoms  Description: Will show no infection signs and symptoms  Outcome: Ongoing  Goal: Absence of new skin breakdown  Description: Absence of new skin breakdown  Outcome: Ongoing     Problem: SAFETY  Goal: Free from accidental physical injury  Outcome: Ongoing  Goal: Free from intentional harm  Outcome: Ongoing     Problem: DAILY CARE  Goal: Daily care needs are met  Outcome: Ongoing     Problem: PAIN  Goal: Patient's pain/discomfort is manageable  Outcome: Ongoing     Problem: SKIN INTEGRITY  Goal: Skin integrity is maintained or improved  Outcome: Ongoing     Problem: KNOWLEDGE DEFICIT  Goal: Patient/S.O. demonstrates understanding of disease process, treatment plan, medications, and discharge instructions.   Outcome: Ongoing     Problem: DISCHARGE BARRIERS  Goal: Patient's continuum of care needs are met  Outcome: Ongoing     Problem: Nutrition  Goal: Optimal nutrition therapy  Outcome: Ongoing

## 2021-07-25 NOTE — PLAN OF CARE
Problem: Falls - Risk of:  Goal: Will remain free from falls  Description: Will remain free from falls  7/25/2021 1541 by Sudhakar Sierra RN  Outcome: Ongoing  7/25/2021 8299 by David Browning RN  Outcome: Ongoing  Goal: Absence of physical injury  Description: Absence of physical injury  7/25/2021 1541 by Sudhakar Sierra RN  Outcome: Ongoing  7/25/2021 0633 by David Browning RN  Outcome: Ongoing

## 2021-07-25 NOTE — PROGRESS NOTES
[facility]    [facility]              [phone]                        Interval History and plan:      His blood pressure is going up. On normal saline  Now allowed to take dysphagia diet      Plan:  Add amlodipine crushed  Hold IV saline                   Assessment :     Hypertension - emergency  BP: (122-160)/(69-83)  Pulse:  [63-70]   BP goal inpatient 850-312 systolic inpatient  Severe  Peak was 205/130 on admission coming down significantly though is still on high side at the time of consult  UA-bland  CT abdomen on 7/21-kidneys unremarkable, unremarkable adrenal glands  Renal function is normal    Hypokalemia  Likely due to nausea and vomiting      [facility] would like to thank Monica Matias MD   for opportunity to serve this patient      Please call with questions at-   24 Hrs Answering service [phone] or  7 am- 5 pm via Perfect serve or cell phone  Salinas Lafleur MD          CC/reason for consult :     Hypertension urgency     HPI :     Sammi Louise is a 70 y.o. male presented to   the hospital on 7/20/2021 with left-sided headache nausea and vomiting. He has dysarthria and unable to provide much details. Per report she also has syncopal episodes several times over the last 7 days. 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 He also has dysphagia because of which he is unable to take p.o. we will continue p.o. medications. He is not known to be on any medication at home. He is currently n.p.o. and getting evaluated for dysphagia. He is noted to have severe hypertension which has been difficult to control. He is also complaining dizziness and headache for which he has had CT scan of the brain done and also MRI. He has chronic microvascular ischemic changes normal pressure hydrocephalus cannot be excluded.     We are consulted for severe hypertension and related issues    ROS:     Seen with-no family    positives in bold   Constitutional:  fever, chills, weakness, weight change, fatigue  Skin:  rash, pruritus, hair loss, bruising, dry skin, petechiae  Head, Face, Neck   headaches, swelling,  cervical adenopathy  Respiratory: shortness of breath, cough, or wheezing  Cardiovascular: chest pain, palpitations, dizzy, edema  Gastrointestinal: nausea, vomiting, diarrhea, constipation,belly pain    Yellow skin, blood in stool  Musculoskeletal:  back pain, muscle weakness, gait problems,       joint pain or swelling. Genitourinary:  dysuria, poor urine flow, flank pain, blood in urine  Neurologic:  vertigo, TIA'S, syncope, seizures, focal weakness  Psychosocial:  insomnia, anxiety, or depression. Additional positive findings:             All other remaining systems are negative or unable to obtain        PMH/PSH/SH/Family History:     Past Medical History:   Diagnosis Date    Cancer ([facility] [address]     jaw    Radiation     S/P chemotherapy, time since greater than 12 weeks        Past Surgical History:   Procedure Laterality Date    EYE SURGERY Right 12/26/13    cataract    OTHER SURGICAL HISTORY      jaw cancer        reports that he quit smoking about 8 years ago. He quit after 40.00 years of use. He has never used smokeless tobacco. He reports current alcohol use. He reports that he does not use drugs. family history is not on file.          Medication:     Current Facility-Administered Medications: amLODIPine (NORVASC) tablet 5 mg, 5 mg, Oral, Daily  potassium chloride (KLOR-CON M) extended release tablet 20 mEq, 20 mEq, Oral, PRN **OR** potassium bicarb-citric acid (EFFER-K) effervescent tablet 40 mEq, 40 mEq, Oral, PRN **OR** potassium chloride 10 mEq/100 mL IVPB (Peripheral Line), 10 mEq, Intravenous, PRN  0.9% NaCl with KCl 40 mEq infusion, , Intravenous, Continuous  butalbital-acetaminophen-caffeine (FIORICET, ESGIC) per tablet 1 tablet, 1 tablet, Oral, Q4H PRN  metoprolol (LOPRESSOR) injection 5 mg, 5 mg, Intravenous, Q6H PRN  cloNIDine (CATAPRES) 0.1 MG/24HR 1 patch, 1 patch, Transdermal, Weekly  promethazine (PHENERGAN) injection 12.5 mg, 12.5 mg, Intravenous, Q4H PRN  promethazine (PHENERGAN) tablet 12.5 mg, 12.5 mg, Oral, Q4H PRN  pantoprazole (PROTONIX) injection 40 mg, 40 mg, Intravenous, Daily  sodium chloride flush 0.9 % injection 10 mL, 10 mL, Intravenous, PRN  0.9 % sodium chloride infusion, 25 mL, Intravenous, PRN  magnesium sulfate 1000 mg in dextrose 5% 100 mL IVPB, 1,000 mg, Intravenous, PRN  acetaminophen (TYLENOL) tablet 650 mg, 650 mg, Oral, Q6H PRN **OR** acetaminophen (TYLENOL) suppository 650 mg, 650 mg, Rectal, Q6H PRN  [Held by provider] enoxaparin (LOVENOX) injection 40 mg, 40 mg, Subcutaneous, Daily  hydrALAZINE (APRESOLINE) injection 10 mg, 10 mg, Intravenous, Q6H PRN       Vitals :     Vitals:    07/25/21 0745   BP: (!) 160/83   Pulse: 63   Resp: 16   Temp: 97.9 °F (36.6 °C)   SpO2: 94%       I & O :       Intake/Output Summary (Last 24 hours) at 7/25/2021 1137  Last data filed at 7/25/2021 9777  Gross per 24 hour   Intake 630 ml   Output 1400 ml   Net -770 ml        Physical Examination :     General appearance: Anxious- no, distressed- no, in good spirits-  No, symmetric face  HEENT: Lips- normal, teeth- ok , oral mucosa- moist  Neck : Mass- no, appears symmetrical, JVD- not visible  Respiratory: Respiratory effort-normal, wheeze- no, crackles -none  Cardiovascular:  Ausculation- No M/R/G, Edema none  Abdomen: visible mass- no, distention- no, scar- no, tenderness- no                            hepatosplenomegaly-  no  Musculoskeletal:  clubbing no,cyanosis- no , digital ischemia- no                           muscle strength- grossly normal , tone - grossly normal  Skin: rashes- no , ulcers- no, induration- no, tightening - no  Psychiatric:  Judgement and insight- normal           AAO X 3  Additional finding:-     LABS:     Recent Labs     07/23/21  0542 07/24/21  0600 07/25/21  0728   WBC 10.7 8.9 7.9   HGB 16.3 15.5 14.0   HCT 46.3 44.7 40.6    228 185     Recent Labs     07/23/21  0542 07/23/21  0542 07/23/21  1412 07/24/21  0600 07/25/21  0728   *  --   --  134* 135*   K 2.7*   < > 3.1* 3.3* 3.5   CL 95*  --   --  97* 100   CO2 26  --   --  25 25   BUN 10  --   --  16 14   CREATININE <0.5*  --   --  0.7* 0.6*   GLUCOSE 130*  --   --  98 100*   MG 2.00  --   --  2.20 1.90    < > = values in this interval not displayed.

## 2021-07-25 NOTE — PROGRESS NOTES
Hospitalist Progress Note      PCP: No primary care provider on file. Date of Admission: 7/20/2021    Chief Complaint:   Headache/ dysphagia    Hospital Course:   Floyd Mcintosh a 70 y. o. male with a history of head and neck cancer-apparently in remission. Has had jaw surgery. He was admitted for intractable headache as well as dysphagia to solids more than liquids. He has a history of partial glossectomy and mandibulectomy about seven years ago in 2014 followed by radiation therapy. [facility] states he completed five years of regular follow up and monitoring thereafter. Roland Sandoval does not seem he has gotten much medical care aside from that. [facility] does not take any prescribed medications. His headache is mostly in the temporal region on the left side more so in the last 3 days. He denies any visual symptoms but has photophobia. He has associated nausea and vomiting. Has had dysphagia to solids as well but needs work-up. He had an esophagogram this morning suggestive of esophagitis with impaired peristalsis. She is due to have an EGD with GI tomorrow.       Subjective:   Headache reduced slightly  Denies nausea or vomiting  No visual symptoms  Reduced urine output      Medications:  Reviewed    Infusion Medications    sodium chloride       Scheduled Medications    amLODIPine  5 mg Oral Daily    cloNIDine  1 patch Transdermal Weekly    pantoprazole  40 mg Intravenous Daily    [Held by provider] enoxaparin  40 mg Subcutaneous Daily     PRN Meds: potassium chloride **OR** potassium alternative oral replacement **OR** potassium chloride, butalbital-acetaminophen-caffeine, metoprolol, promethazine, promethazine, sodium chloride flush, sodium chloride, magnesium sulfate, acetaminophen **OR** acetaminophen, hydrALAZINE      Intake/Output Summary (Last 24 hours) at 7/25/2021 1212  Last data filed at 7/25/2021 0962  Gross per 24 hour   Intake 630 ml   Output 1000 ml   Net -370 ml       Physical Exam Performed:    /76   Pulse 73   Temp 97.9 °F (36.6 °C) (Oral)   Resp 16   Ht 5' 9\" (1.753 m)   Wt 160 lb 1.6 oz (72.6 kg)   SpO2 95%   BMI 23.64 kg/m²     General appearance: No apparent distress, appears stated age and cooperative. HEENT: Glossectomised, eccentric/ exposed leonor/dry. Pupils equal, round, and reactive to light. Conjunctivae/corneas clear. Neck: Supple, with full range of motion. No jugular venous distention. Trachea midline. Respiratory:  Normal respiratory effort. Clear to auscultation, bilaterally without Rales/Wheezes/Rhonchi. Cardiovascular: Regular rate and rhythm with normal S1/S2 without murmurs, rubs or gallops. Abdomen: Soft, non-tender, non-distended with normal bowel sounds. Musculoskeletal: No clubbing, cyanosis or edema bilaterally. Full range of motion without deformity. Skin: Skin color, texture, turgor normal.  No rashes or lesions. Neurologic:  Neurovascularly intact without any focal sensory/motor deficits. Cranial nerves: II-XII intact, grossly non-focal.  Psychiatric: Alert and oriented, thought content appropriate, normal insight  Capillary Refill: Brisk,3 seconds, normal   Peripheral Pulses: +2 palpable, equal bilaterally       Labs:   Recent Labs     07/23/21  0542 07/24/21  0600 07/25/21  0728   WBC 10.7 8.9 7.9   HGB 16.3 15.5 14.0   HCT 46.3 44.7 40.6    228 185     Recent Labs     07/23/21  0542 07/23/21  0542 07/23/21  1412 07/24/21  0600 07/25/21  0728   *  --   --  134* 135*   K 2.7*   < > 3.1* 3.3* 3.5   CL 95*  --   --  97* 100   CO2 26  --   --  25 25   BUN 10  --   --  16 14   CREATININE <0.5*  --   --  0.7* 0.6*   CALCIUM 9.4  --   --  9.1 8.7    < > = values in this interval not displayed. No results for input(s): AST, ALT, BILIDIR, BILITOT, ALKPHOS in the last 72 hours. No results for input(s): INR in the last 72 hours. No results for input(s): Adamaris Basques in the last 72 hours.     Urinalysis:      Lab Results Component Value Date    NITRU Negative 07/20/2021    BLOODU Negative 07/20/2021    SPECGRAV 1.015 07/20/2021    GLUCOSEU Negative 07/20/2021    GLUCOSEU Neg 03/25/2010       Radiology:  VL Renal Arterial Duplex Complete   Final Result      MRI BRAIN WO CONTRAST   Final Result   1. No acute intracranial abnormality. No acute infarct. 2. There is global parenchymal volume loss with mild chronic microvascular   ischemic changes. 3.  The ventricular dilatation is out of proportion to the cortical sulci. A   component of normal pressure hydrocephalus cannot be excluded. FL ESOPHAGRAM   Final Result   1. Suboptimal study due to patient's limited ability to cooperate. 2. Esophageal dysmotility with disordered primary peristalsis. 3. Findings suggestive of smooth luminal narrowing of the distal esophagus in   the region of the GE junction. Finding could be on the basis of reflux   esophagitis. Other etiologies cannot be excluded based on this examination. CT ABDOMEN PELVIS W IV CONTRAST Additional Contrast? None   Final Result   1. Wall thickening of the visualized esophagus. Infection versus   inflammation. Clinical follow-up recommended. 2. No appendicitis, diverticulitis, or small bowel obstruction. 3. Other findings as described. CT Head WO Contrast   Final Result   Age-related involutional changes with no acute intracranial abnormality. XR CHEST (2 VW)   Final Result   No radiographic evidence of acute pulmonary disease. ASSESSMENT & PLAN  1. Hypertensive urgency-blood pressure profile not optimally controlled . -Cont Lopressor 5mg iv q6 prn for BP >160/95mmHg  -Continue on clonidine 0.1mg/d patch  -DC Clonidine patch if BP<110mmHg  -Norvasc 5 mg daily added    2. Hypokalemia, slow to correct  -Check mag  -KCl 20mEq iv x 4 doses q4  -Check K level after 3rd dose  -Fluid resus  -Monitor urine output    3.  Dysphagia to solids more than liquids associated with Nausea/Vomiting. Esophagogram suggestive of reflux esophagitis/poor peristalsis. - Continue fluid resuscitation   - Await EGD tomorrow.   -Continue IV protonix.    -Use antiemetics prn.  -N.p.o. past midnight.     4. Headache-bothersome. Unknown etiology yet. MRI head suggestive of normal pressure hydrocephalus. Resolved.   -Consult Neurology  -Continue Fioricet prn       DVT prophylaxis:        SCDs, lovenox  Code Status:               Full  Disposition:                 Inpatient     PT/OT Eval Status: Lovenox     Dispo -keep as an inpatient  José Miguel Kaplan MD

## 2021-07-26 ENCOUNTER — ANESTHESIA EVENT (OUTPATIENT)
Dept: ENDOSCOPY | Age: 71
DRG: 305 | End: 2021-07-26
Payer: MEDICARE

## 2021-07-26 ENCOUNTER — ANESTHESIA (OUTPATIENT)
Dept: ENDOSCOPY | Age: 71
DRG: 305 | End: 2021-07-26
Payer: MEDICARE

## 2021-07-26 VITALS — DIASTOLIC BLOOD PRESSURE: 117 MMHG | SYSTOLIC BLOOD PRESSURE: 162 MMHG | OXYGEN SATURATION: 100 %

## 2021-07-26 LAB
ANION GAP SERPL CALCULATED.3IONS-SCNC: 9 MMOL/L (ref 3–16)
BASOPHILS ABSOLUTE: 0 K/UL (ref 0–0.2)
BASOPHILS RELATIVE PERCENT: 0.6 %
BUN BLDV-MCNC: 12 MG/DL (ref 7–20)
CALCIUM SERPL-MCNC: 9.1 MG/DL (ref 8.3–10.6)
CHLORIDE BLD-SCNC: 99 MMOL/L (ref 99–110)
CO2: 27 MMOL/L (ref 21–32)
CREAT SERPL-MCNC: 0.6 MG/DL (ref 0.8–1.3)
EOSINOPHILS ABSOLUTE: 0.3 K/UL (ref 0–0.6)
EOSINOPHILS RELATIVE PERCENT: 4.9 %
GFR AFRICAN AMERICAN: >60
GFR NON-AFRICAN AMERICAN: >60
GLUCOSE BLD-MCNC: 117 MG/DL (ref 70–99)
GLUCOSE BLD-MCNC: 80 MG/DL (ref 70–99)
GLUCOSE BLD-MCNC: 84 MG/DL (ref 70–99)
GLUCOSE BLD-MCNC: 90 MG/DL (ref 70–99)
GLUCOSE BLD-MCNC: 98 MG/DL (ref 70–99)
HCT VFR BLD CALC: 39.9 % (ref 40.5–52.5)
HEMOGLOBIN: 14 G/DL (ref 13.5–17.5)
LYMPHOCYTES ABSOLUTE: 1.4 K/UL (ref 1–5.1)
LYMPHOCYTES RELATIVE PERCENT: 21.1 %
MAGNESIUM: 1.9 MG/DL (ref 1.8–2.4)
MCH RBC QN AUTO: 33 PG (ref 26–34)
MCHC RBC AUTO-ENTMCNC: 35.1 G/DL (ref 31–36)
MCV RBC AUTO: 93.8 FL (ref 80–100)
METANEPH/PLASMA INTERP: ABNORMAL
METANEPHRINE FREE PLASMA: 0.27 NMOL/L (ref 0–0.49)
MONOCYTES ABSOLUTE: 0.6 K/UL (ref 0–1.3)
MONOCYTES RELATIVE PERCENT: 9.3 %
NEUTROPHILS ABSOLUTE: 4.1 K/UL (ref 1.7–7.7)
NEUTROPHILS RELATIVE PERCENT: 64.1 %
NORMETANEPHRINE FREE PLASMA: 1.08 NMOL/L (ref 0–0.89)
PDW BLD-RTO: 13.4 % (ref 12.4–15.4)
PERFORMED ON: ABNORMAL
PERFORMED ON: NORMAL
PLATELET # BLD: 191 K/UL (ref 135–450)
PMV BLD AUTO: 6.7 FL (ref 5–10.5)
POTASSIUM SERPL-SCNC: 3.1 MMOL/L (ref 3.5–5.1)
RBC # BLD: 4.25 M/UL (ref 4.2–5.9)
SODIUM BLD-SCNC: 135 MMOL/L (ref 136–145)
WBC # BLD: 6.4 K/UL (ref 4–11)

## 2021-07-26 PROCEDURE — 6360000002 HC RX W HCPCS: Performed by: INTERNAL MEDICINE

## 2021-07-26 PROCEDURE — 88313 SPECIAL STAINS GROUP 2: CPT

## 2021-07-26 PROCEDURE — 1200000000 HC SEMI PRIVATE

## 2021-07-26 PROCEDURE — 80048 BASIC METABOLIC PNL TOTAL CA: CPT

## 2021-07-26 PROCEDURE — 88305 TISSUE EXAM BY PATHOLOGIST: CPT

## 2021-07-26 PROCEDURE — 3700000000 HC ANESTHESIA ATTENDED CARE: Performed by: INTERNAL MEDICINE

## 2021-07-26 PROCEDURE — 2580000003 HC RX 258: Performed by: NURSE ANESTHETIST, CERTIFIED REGISTERED

## 2021-07-26 PROCEDURE — 2500000003 HC RX 250 WO HCPCS: Performed by: NURSE ANESTHETIST, CERTIFIED REGISTERED

## 2021-07-26 PROCEDURE — 88341 IMHCHEM/IMCYTCHM EA ADD ANTB: CPT

## 2021-07-26 PROCEDURE — 85025 COMPLETE CBC W/AUTO DIFF WBC: CPT

## 2021-07-26 PROCEDURE — C9113 INJ PANTOPRAZOLE SODIUM, VIA: HCPCS | Performed by: INTERNAL MEDICINE

## 2021-07-26 PROCEDURE — 7100000011 HC PHASE II RECOVERY - ADDTL 15 MIN: Performed by: INTERNAL MEDICINE

## 2021-07-26 PROCEDURE — 88342 IMHCHEM/IMCYTCHM 1ST ANTB: CPT

## 2021-07-26 PROCEDURE — 3609012400 HC EGD TRANSORAL BIOPSY SINGLE/MULTIPLE: Performed by: INTERNAL MEDICINE

## 2021-07-26 PROCEDURE — 6370000000 HC RX 637 (ALT 250 FOR IP): Performed by: INTERNAL MEDICINE

## 2021-07-26 PROCEDURE — 6360000002 HC RX W HCPCS: Performed by: NURSE ANESTHETIST, CERTIFIED REGISTERED

## 2021-07-26 PROCEDURE — 2709999900 HC NON-CHARGEABLE SUPPLY: Performed by: INTERNAL MEDICINE

## 2021-07-26 PROCEDURE — 83735 ASSAY OF MAGNESIUM: CPT

## 2021-07-26 PROCEDURE — 2580000003 HC RX 258: Performed by: INTERNAL MEDICINE

## 2021-07-26 PROCEDURE — 7100000010 HC PHASE II RECOVERY - FIRST 15 MIN: Performed by: INTERNAL MEDICINE

## 2021-07-26 PROCEDURE — 88312 SPECIAL STAINS GROUP 1: CPT

## 2021-07-26 PROCEDURE — 0DB58ZX EXCISION OF ESOPHAGUS, VIA NATURAL OR ARTIFICIAL OPENING ENDOSCOPIC, DIAGNOSTIC: ICD-10-PCS | Performed by: INTERNAL MEDICINE

## 2021-07-26 PROCEDURE — 3700000001 HC ADD 15 MINUTES (ANESTHESIA): Performed by: INTERNAL MEDICINE

## 2021-07-26 PROCEDURE — 36415 COLL VENOUS BLD VENIPUNCTURE: CPT

## 2021-07-26 RX ORDER — PROPOFOL 10 MG/ML
INJECTION, EMULSION INTRAVENOUS PRN
Status: DISCONTINUED | OUTPATIENT
Start: 2021-07-26 | End: 2021-07-26 | Stop reason: SDUPTHER

## 2021-07-26 RX ORDER — DIPHENHYDRAMINE HYDROCHLORIDE 50 MG/ML
12.5 INJECTION INTRAMUSCULAR; INTRAVENOUS
Status: DISCONTINUED | OUTPATIENT
Start: 2021-07-26 | End: 2021-07-26 | Stop reason: HOSPADM

## 2021-07-26 RX ORDER — POTASSIUM CHLORIDE 7.45 MG/ML
10 INJECTION INTRAVENOUS
Status: DISCONTINUED | OUTPATIENT
Start: 2021-07-26 | End: 2021-07-27 | Stop reason: HOSPADM

## 2021-07-26 RX ORDER — HYDRALAZINE HYDROCHLORIDE 20 MG/ML
5 INJECTION INTRAMUSCULAR; INTRAVENOUS EVERY 10 MIN PRN
Status: DISCONTINUED | OUTPATIENT
Start: 2021-07-26 | End: 2021-07-26 | Stop reason: HOSPADM

## 2021-07-26 RX ORDER — PROMETHAZINE HYDROCHLORIDE 25 MG/ML
6.25 INJECTION, SOLUTION INTRAMUSCULAR; INTRAVENOUS
Status: DISCONTINUED | OUTPATIENT
Start: 2021-07-26 | End: 2021-07-26 | Stop reason: HOSPADM

## 2021-07-26 RX ORDER — LABETALOL HYDROCHLORIDE 5 MG/ML
5 INJECTION, SOLUTION INTRAVENOUS EVERY 10 MIN PRN
Status: DISCONTINUED | OUTPATIENT
Start: 2021-07-26 | End: 2021-07-26 | Stop reason: HOSPADM

## 2021-07-26 RX ORDER — OXYCODONE HYDROCHLORIDE AND ACETAMINOPHEN 5; 325 MG/1; MG/1
2 TABLET ORAL PRN
Status: DISCONTINUED | OUTPATIENT
Start: 2021-07-26 | End: 2021-07-26 | Stop reason: HOSPADM

## 2021-07-26 RX ORDER — ONDANSETRON 2 MG/ML
4 INJECTION INTRAMUSCULAR; INTRAVENOUS PRN
Status: DISCONTINUED | OUTPATIENT
Start: 2021-07-26 | End: 2021-07-26 | Stop reason: HOSPADM

## 2021-07-26 RX ORDER — OXYCODONE HYDROCHLORIDE AND ACETAMINOPHEN 5; 325 MG/1; MG/1
1 TABLET ORAL PRN
Status: DISCONTINUED | OUTPATIENT
Start: 2021-07-26 | End: 2021-07-26 | Stop reason: HOSPADM

## 2021-07-26 RX ORDER — SODIUM CHLORIDE, SODIUM LACTATE, POTASSIUM CHLORIDE, CALCIUM CHLORIDE 600; 310; 30; 20 MG/100ML; MG/100ML; MG/100ML; MG/100ML
INJECTION, SOLUTION INTRAVENOUS CONTINUOUS PRN
Status: DISCONTINUED | OUTPATIENT
Start: 2021-07-26 | End: 2021-07-26 | Stop reason: SDUPTHER

## 2021-07-26 RX ORDER — MEPERIDINE HYDROCHLORIDE 50 MG/ML
12.5 INJECTION INTRAMUSCULAR; INTRAVENOUS; SUBCUTANEOUS EVERY 5 MIN PRN
Status: DISCONTINUED | OUTPATIENT
Start: 2021-07-26 | End: 2021-07-26 | Stop reason: HOSPADM

## 2021-07-26 RX ORDER — LIDOCAINE HYDROCHLORIDE 20 MG/ML
INJECTION, SOLUTION INFILTRATION; PERINEURAL PRN
Status: DISCONTINUED | OUTPATIENT
Start: 2021-07-26 | End: 2021-07-26 | Stop reason: SDUPTHER

## 2021-07-26 RX ADMIN — POTASSIUM CHLORIDE 10 MEQ: 7.46 INJECTION, SOLUTION INTRAVENOUS at 10:36

## 2021-07-26 RX ADMIN — Medication 30 UNITS: at 13:09

## 2021-07-26 RX ADMIN — PROPOFOL 20 MG: 10 INJECTION, EMULSION INTRAVENOUS at 10:07

## 2021-07-26 RX ADMIN — Medication 10 ML: at 08:44

## 2021-07-26 RX ADMIN — POTASSIUM CHLORIDE 10 MEQ: 7.46 INJECTION, SOLUTION INTRAVENOUS at 09:49

## 2021-07-26 RX ADMIN — PANTOPRAZOLE SODIUM 40 MG: 40 INJECTION, POWDER, FOR SOLUTION INTRAVENOUS at 08:44

## 2021-07-26 RX ADMIN — LIDOCAINE HYDROCHLORIDE 100 MG: 20 INJECTION, SOLUTION INFILTRATION; PERINEURAL at 10:03

## 2021-07-26 RX ADMIN — POTASSIUM CHLORIDE 10 MEQ: 7.46 INJECTION, SOLUTION INTRAVENOUS at 12:26

## 2021-07-26 RX ADMIN — POTASSIUM CHLORIDE 10 MEQ: 7.46 INJECTION, SOLUTION INTRAVENOUS at 15:05

## 2021-07-26 RX ADMIN — SODIUM CHLORIDE, SODIUM LACTATE, POTASSIUM CHLORIDE, AND CALCIUM CHLORIDE: .6; .31; .03; .02 INJECTION, SOLUTION INTRAVENOUS at 09:42

## 2021-07-26 RX ADMIN — SODIUM CHLORIDE 25 ML: 9 INJECTION, SOLUTION INTRAVENOUS at 09:40

## 2021-07-26 RX ADMIN — AMLODIPINE BESYLATE 5 MG: 5 TABLET ORAL at 12:39

## 2021-07-26 RX ADMIN — PROPOFOL 50 MG: 10 INJECTION, EMULSION INTRAVENOUS at 10:05

## 2021-07-26 ASSESSMENT — PAIN SCALES - GENERAL
PAINLEVEL_OUTOF10: 0

## 2021-07-26 NOTE — ANESTHESIA PRE PROCEDURE
Department of Anesthesiology  Preprocedure Note       Name:  Yue Aguillon   Age:  70 y.o.  :  1950                                          MRN:  [id number]         Date:  2021      Surgeon: Alejandro Gates):  Javier Galdamez MD    Procedure: Procedure(s):  EGD BIOPSY    Medications prior to admission:   Prior to Admission medications    Medication Sig Start Date End Date Taking?  Authorizing Provider   dextromethorphan-guaiFENesin (MUCINEX DM)  MG per extended release tablet Take 1 tablet by mouth every 12 hours as needed   Yes Historical Provider, MD       Current medications:    Current Facility-Administered Medications   Medication Dose Route Frequency Provider Last Rate Last Admin    potassium chloride 10 mEq/100 mL IVPB (Peripheral Line)  10 mEq Intravenous Q1H PRN Mayank Small MD        amLODIPine (NORVASC) tablet 5 mg  5 mg Oral Daily Dominique Yost MD   5 mg at 21 1213    potassium chloride (KLOR-CON M) extended release tablet 20 mEq  20 mEq Oral PRN Mayank Small MD        Or    potassium bicarb-citric acid (EFFER-K) effervescent tablet 40 mEq  40 mEq Oral PRN Mayank Small MD   40 mEq at 21 1620    Or    potassium chloride 10 mEq/100 mL IVPB (Peripheral Line)  10 mEq Intravenous PRN Mayank Small MD        butalbital-acetaminophen-caffeine (FIORICET, [facility]) per tablet 1 tablet  1 tablet Oral Q4H PRN Mayank Small MD        metoprolol (LOPRESSOR) injection 5 mg  5 mg Intravenous Q6H PRN Mayank Small MD   5 mg at 21 1030    cloNIDine (CATAPRES) 0.1 MG/24HR 1 patch  1 patch Transdermal Weekly Mayank Small MD   1 patch at 21 1454    promethazine (PHENERGAN) injection 12.5 mg  12.5 mg Intravenous Q4H PRN Damian Fatima MD   12.5 mg at 21 0523    promethazine (PHENERGAN) tablet 12.5 mg  12.5 mg Oral Q4H PRN Damian Fatima MD        pantoprazole (PROTONIX) injection 40 mg  40 mg Intravenous Daily Viky Acevedo MD   40 mg at 21 0844    sodium chloride flush 0.9 % injection 10 mL  10 mL Intravenous PRN Viky Acevedo MD   10 mL at 21 0844    0.9 % sodium chloride infusion  25 mL Intravenous PRN Viky Acevedo MD        magnesium sulfate 1000 mg in dextrose 5% 100 mL IVPB  1,000 mg Intravenous PRN Viky Acevedo MD        acetaminophen (TYLENOL) tablet 650 mg  650 mg Oral Q6H PRN Viky Acevedo MD        Or   Anne Gallo acetaminophen (TYLENOL) suppository 650 mg  650 mg Rectal Q6H PRN Viky Acevedo MD        [Held by provider] enoxaparin (LOVENOX) injection 40 mg  40 mg Subcutaneous Daily Viky Acevedo MD   40 mg at 21 0946    hydrALAZINE (APRESOLINE) injection 10 mg  10 mg Intravenous Q6H PRN Yazmin Camacho MD   10 mg at 21 0846       Allergies:     Allergies   Allergen Reactions    Morphine        Problem List:    Patient Active Problem List   Diagnosis Code    Nondisplaced unspecified condyle fracture of lower end of left femur, [facility] S72.415A    Alcohol abuse F10.10    Lactic acidosis E87.2    Hyponatremia E87.1    Moderate protein-calorie malnutrition (HCC) E44.0    Headache R51.9    Intractable nausea and vomiting R11.2    History of oral cancer Z85.819    Dysphagia R13.10    New persistent daily headache G44.52    NPH (normal pressure hydrocephalus) (HCC) G91.2    HTN (hypertension), benign I10       Past Medical History:        Diagnosis Date    [facility]     jaw    Radiation     S/P chemotherapy, time since greater than 12 weeks        Past Surgical History:        Procedure Laterality Date    EYE SURGERY Right 13    cataract    OTHER SURGICAL HISTORY      jaw cancer       Social History:    Social History     Tobacco Use    Smoking status: Former Smoker     Years: 40.00     Quit date: 2013     Years since quittin.5    Smokeless tobacco: Never Used   Substance Use Topics    Alcohol use: Yes     Comment: Multiple beers daily                                Counseling given: Not Answered      Vital Signs (Current):   Vitals:    07/25/21 1457 07/25/21 2033 07/26/21 0525 07/26/21 0815   BP: 136/71 137/82 130/65 128/67   Pulse: 88 78 69 73   Resp: 16 16 14 18   Temp: 98.4 °F (36.9 °C) 97.8 °F (36.6 °C) 98.1 °F (36.7 °C) 97.6 °F (36.4 °C)   TempSrc: Oral Oral Oral Oral   SpO2: 96% 97% 94% 96%   Weight:       Height:                                                  BP Readings from Last 3 Encounters:   07/26/21 128/67   11/03/17 128/86   12/26/13 130/78       NPO Status:                                                                                 BMI:   Wt Readings from Last 3 Encounters:   07/25/21 160 lb 1.6 oz (72.6 kg)   12/12/17 155 lb 6.8 oz (70.5 kg)   11/21/17 155 lb 6.8 oz (70.5 kg)     Body mass index is 23.64 kg/m².     CBC:   Lab Results   Component Value Date    WBC 6.4 07/26/2021    RBC 4.25 07/26/2021    HGB 14.0 07/26/2021    HCT 39.9 07/26/2021    MCV 93.8 07/26/2021    RDW 13.4 07/26/2021     07/26/2021       CMP:   Lab Results   Component Value Date     07/26/2021    K 3.1 07/26/2021    CL 99 07/26/2021    CO2 27 07/26/2021    BUN 12 07/26/2021    CREATININE 0.6 07/26/2021    GFRAA >60 07/26/2021    GFRAA >60 03/25/2010    AGRATIO 1.5 07/20/2021    LABGLOM >60 07/26/2021    GLUCOSE 98 07/26/2021    PROT 8.0 07/20/2021    PROT 5.0 03/25/2010    CALCIUM 9.1 07/26/2021    BILITOT 0.5 07/20/2021    ALKPHOS 108 07/20/2021    AST 36 07/20/2021    ALT 21 07/20/2021       POC Tests:   Recent Labs     07/26/21  0802   POCGLU 90       Coags:   Lab Results   Component Value Date    PROTIME 12.1 11/01/2017    INR 1.07 11/01/2017       HCG (If Applicable): No results found for: PREGTESTUR, PREGSERUM, HCG, HCGQUANT     ABGs: No results found for: PHART, PO2ART, TJO9XHR, NRI2PUT, BEART, [id number]     Type & Screen (If Applicable):  No results found for: LABABO, [address]    Drug/Infectious Status (If Applicable):  No results found for: HIV, HEPCAB    COVID-19 Screening (If Applicable): No results found for: COVID19        Anesthesia Evaluation  Patient summary reviewed no history of anesthetic complications:   Airway: Mallampati: II  TM distance: >3 FB   Neck ROM: full  Mouth opening: > = 3 FB Dental: normal exam         Pulmonary:Negative Pulmonary ROS and normal exam  breath sounds clear to auscultation                             Cardiovascular:Negative CV ROS  Exercise tolerance: good (>4 METS),   (+) hypertension:,         Rhythm: regular  Rate: normal                    Neuro/Psych:   Negative Neuro/Psych ROS  (+) headaches:, psychiatric history:            GI/Hepatic/Renal: Neg GI/Hepatic/Renal ROS            Endo/Other: Negative Endo/Other ROS   (+) malignancy/cancer (h/o oral cancer). Abdominal:             Vascular: negative vascular ROS. Other Findings:        Pre-Operative Diagnosis: Headache [R51.9]    70 y.o.   BMI:  Body mass index is 23.64 kg/m².      Vitals:    21 1457 21 2033 21 0525 21 0815   BP: 136/71 137/82 130/65 128/67   Pulse: 88 78 69 73   Resp: 16 16 14 18   Temp: 98.4 °F (36.9 °C) 97.8 °F (36.6 °C) 98.1 °F (36.7 °C) 97.6 °F (36.4 °C)   TempSrc: Oral Oral Oral Oral   SpO2: 96% 97% 94% 96%   Weight:       Height:           Allergies   Allergen Reactions    Morphine        Social History     Tobacco Use    Smoking status: Former Smoker     Years: 40.00     Quit date: 2013     Years since quittin.5    Smokeless tobacco: Never Used   Substance Use Topics    Alcohol use: Yes     Comment: Multiple beers daily       LABS:    CBC  Lab Results   Component Value Date/Time    WBC 6.4 2021 05:25 AM    HGB 14.0 2021 05:25 AM    HCT 39.9 (L) 2021 05:25 AM     2021 05:25 AM     RENAL  Lab Results   Component Value Date/Time     (L) 2021 05:25 AM    K 3.1 (L) 07/26/2021 05:25 AM    CL 99 07/26/2021 05:25 AM    CO2 27 07/26/2021 05:25 AM    BUN 12 07/26/2021 05:25 AM    CREATININE 0.6 (L) 07/26/2021 05:25 AM    GLUCOSE 98 07/26/2021 05:25 AM     COAGS  Lab Results   Component Value Date/Time    PROTIME 12.1 11/01/2017 06:20 AM    INR 1.07 11/01/2017 06:20 AM         Davis Tejada MD     Anesthesia Plan      general     ASA 3     (I discussed with the patient the risks and benefits of PIV, anesthesia, IV Narcotics, PACU. All questions were answered the patient agrees with the plan and wishes to proceed)  Induction: intravenous.                           Davis Tejada MD   7/26/2021

## 2021-07-26 NOTE — H&P
Pertinent Complete H & P EGD  =======================  The patient has dysphagia. Originally when I saw him in the emergency room, he was not even able to take enough liquids. However, he has improved progressively. He told me this morning prior to the procedure that he is even able to take solids without a problem as long as the solid is not very hard. No chest pain or SOB    PAST MEDICAL HISTORY     Past Medical History:   Diagnosis Date    Cancer ([address]     jaw    Radiation     S/P chemotherapy, time since greater than 12 weeks      FAMILY HISTORY   History reviewed. No pertinent family history. SOCIAL HISTORY     Social History     Socioeconomic History    Marital status: Single     Spouse name: Not on file    Number of children: 1    Years of education: 15    Highest education level: Not on file   Occupational History    Not on file   Tobacco Use    Smoking status: Former Smoker     Years: 40.00     Quit date: 2013     Years since quittin.5    Smokeless tobacco: Never Used   Substance and Sexual Activity    Alcohol use: Yes     Comment: Multiple beers daily    Drug use: No    Sexual activity: Never   Other Topics Concern    Not on file   Social History Narrative    Not on file     Social Determinants of Health     Financial Resource Strain:     Difficulty of Paying Living Expenses:    Food Insecurity:     Worried About Running Out of Food in the Last Year:     [address] in the Last Year:    Transportation Needs:     Lack of Transportation (Medical):      Lack of Transportation (Non-Medical):    Physical Activity:     Days of Exercise per Week:     Minutes of Exercise per Session:    Stress:     Feeling of Stress :    Social Connections:     Frequency of Communication with Friends and Family:     Frequency of Social Gatherings with Friends and Family:     Attends Nondenominational Services:     Active Member of Clubs or Organizations:     Attends Club or Organization Meetings:     Marital Status:    Intimate Partner Violence:     Fear of Current or Ex-Partner:     Emotionally Abused:     Physically Abused:     Sexually Abused:        SURGICAL HISTORY     Past Surgical History:   Procedure Laterality Date    EYE SURGERY Right 12/26/13    cataract    OTHER SURGICAL HISTORY      jaw cancer     CURRENT MEDICATIONS   (This list may include medications prescribed during this encounter as epic can not insert only the list prior to this encounter.)    No current facility-administered medications on file prior to encounter. Current Outpatient Medications on File Prior to Encounter   Medication Sig Dispense Refill    dextromethorphan-guaiFENesin ([address] DM)  MG per extended release tablet Take 1 tablet by mouth every 12 hours as needed       ALLERGIES     Allergies   Allergen Reactions    Morphine          PHYSICAL EXAM   VITAL SIGNS: /67   Pulse 73   Temp 97.6 °F (36.4 °C) (Oral)   Resp 18   Ht 5' 9\" (1.753 m)   Wt 160 lb 1.6 oz (72.6 kg)   SpO2 96%   BMI 23.64 kg/m²   Wt Readings from Last 3 Encounters:   07/25/21 160 lb 1.6 oz (72.6 kg)   12/12/17 155 lb 6.8 oz (70.5 kg)   11/21/17 155 lb 6.8 oz (70.5 kg)       A & O X3  Deformity related to surgeries on the jaw. The tongue is protruding. Lungs: Clear to auscultation  Heart: WNL  Abd: soft, non-tender. BS:+. Plan:   EGD with possible Bx and other interventions if needed. The patient is explained why the above procedure is needed and what is involved with the above procedure in simple words along with its need, risks, benefits and alternatives. The prognosis is explained. The risks explained to the patient included, but were not limited to, bleeding, perforation, infection, suppression of breathing, heart attack, stroke, need for longer hospitalization and/or surgery and remotely even death. The patient has voiced the understanding of the above and has been given opportunity to ask questions. No question was left unanswered. The informed consent for the above procedure is obtained - please see my last virtual/office visit note.

## 2021-07-26 NOTE — CARE COORDINATION
Chart reviewed by . Triggered by length of stay - day 5. Pt remains on b3 at this time. Followed by internal medicine, nephrology and GI. S/p EGD today. Pt still with low potassium, being replaced IV and PO. Per initial CM assessment completed on 7/22/21, pt lives with his sisters and denied needs at discharge. Pt is ambulatory per nursing. Likely NN from CM at discharge. Due to length of stay, CM will continue to follow for potential needs.

## 2021-07-26 NOTE — PROGRESS NOTES
MD notified: IV K infiltrated. Pharmacy contacted and stated . 2mls Hylenex may be used with 5x injections around bilateral upper arm infiltrations. Would you like to order? would be a total of 10 injections (5 per area of infiltration).      MD replied: put in order

## 2021-07-26 NOTE — PROGRESS NOTES
Mariah called and stated that pt must begin K replacement prior to EGD. Order for oral replacement only. Per anesthesia they need it to be IV. MD notified: K 3.1 pt NPO for EGD. Requesting 40 mg replacement via IV route due to NPO status. Ok to place order, and put pt on tele? MD replied ok to IV replace.

## 2021-07-26 NOTE — PLAN OF CARE
Resting quietly. No distress noted. Denies pain/discomfort. Monitored frequently.     Problem: Falls - Risk of:  Goal: Will remain free from falls  Description: Will remain free from falls  7/26/2021 0336 by Vi Smallwood RN  Outcome: Ongoing  7/25/2021 1541 by Martinez Melchor RN  Outcome: Ongoing  Goal: Absence of physical injury  Description: Absence of physical injury  7/26/2021 0336 by Vi Smallwood RN  Outcome: Ongoing  7/25/2021 1541 by Martinez Melchor RN  Outcome: Ongoing     Problem: Nausea/Vomiting:  Goal: Absence of nausea/vomiting  Description: Absence of nausea/vomiting  7/26/2021 0336 by Vi Smallwood RN  Outcome: Ongoing  7/25/2021 1541 by Martinez Melchor RN  Outcome: Ongoing  Goal: Able to drink  Description: Able to drink  7/26/2021 0336 by Vi Smallwood RN  Outcome: Ongoing  7/25/2021 1541 by Martinez Melchor RN  Outcome: Ongoing  Goal: Able to eat  Description: Able to eat  7/26/2021 0336 by Vi Smallwood RN  Outcome: Ongoing  7/25/2021 1541 by Martinez Melchor RN  Outcome: Ongoing  Goal: Ability to achieve adequate nutritional intake will improve  Description: Ability to achieve adequate nutritional intake will improve  7/26/2021 0336 by Vi Smallwood RN  Outcome: Ongoing  7/25/2021 1541 by Martinez Melchor RN  Outcome: Ongoing     Problem: Skin Integrity:  Goal: Will show no infection signs and symptoms  Description: Will show no infection signs and symptoms  7/26/2021 0336 by Vi Smallwood RN  Outcome: Ongoing  7/25/2021 1541 by Martinez Melchor RN  Outcome: Ongoing  Goal: Absence of new skin breakdown  Description: Absence of new skin breakdown  7/26/2021 0336 by Vi Smallwood RN  Outcome: Ongoing  7/25/2021 1541 by Martinez Melchor RN  Outcome: Ongoing     Problem: SAFETY  Goal: Free from accidental physical injury  7/26/2021 0336 by Vi Smallwood RN  Outcome: Ongoing  7/25/2021 1541 by Martinez Melchor RN  Outcome: Ongoing  Goal: Free from intentional harm  7/26/2021 0336 by Sheri Stearns RN  Outcome: Ongoing  7/25/2021 1541 by Amari Garcia RN  Outcome: Ongoing     Problem: DAILY CARE  Goal: Daily care needs are met  7/26/2021 0336 by Sheri Stearns RN  Outcome: Ongoing  7/25/2021 1541 by Amari Garcia RN  Outcome: Ongoing     Problem: PAIN  Goal: Patient's pain/discomfort is manageable  7/26/2021 0336 by Sheri Stearns RN  Outcome: Ongoing  7/25/2021 1541 by Amari Garcia RN  Outcome: Ongoing     Problem: SKIN INTEGRITY  Goal: Skin integrity is maintained or improved  7/26/2021 0336 by Sheri Stearns RN  Outcome: Ongoing  7/25/2021 1541 by Amari Garcia RN  Outcome: Ongoing     Problem: KNOWLEDGE DEFICIT  Goal: Patient/S.O. demonstrates understanding of disease process, treatment plan, medications, and discharge instructions.   7/26/2021 0336 by Sheri Stearns RN  Outcome: Ongoing  7/25/2021 1541 by Amari Garcia RN  Outcome: Ongoing     Problem: DISCHARGE BARRIERS  Goal: Patient's continuum of care needs are met  7/26/2021 0336 by Sheri Stearns RN  Outcome: Ongoing  7/25/2021 1541 by Amari Garcia RN  Outcome: Ongoing     Problem: Nutrition  Goal: Optimal nutrition therapy  7/26/2021 0336 by Sheri Stearns RN  Outcome: Ongoing  7/25/2021 1541 by Amari Garcia RN  Outcome: Ongoing

## 2021-07-26 NOTE — ANESTHESIA POSTPROCEDURE EVALUATION
07/26/2021 05:25 AM        BUN                      12                  07/26/2021 05:25 AM        CREATININE               0.6 (L)             07/26/2021 05:25 AM        GLUCOSE                  98                  07/26/2021 05:25 AM   COAADRIANA  Lab Results       Component                Value               Date/Time                  PROTIME                  12.1                11/01/2017 06:20 AM        INR                      1.07                11/01/2017 06:20 AM     Intake & Output:  @29ZXLI@    Nausea & Vomiting:  No    Level of Consciousness:  Awake    Pain Assessment:  Adequate analgesia    Anesthesia Complications:  No apparent anesthetic complications    SUMMARY      Vital signs stable  OK to discharge from Stage I post anesthesia care.   Care transferred from Anesthesiology department on discharge from perioperative area

## 2021-07-26 NOTE — PROGRESS NOTES
Speech Language Pathology    SLP attempted dysphagia f/u, spoke with RN. Per RN, pt leaving for EGD. ST to continue to follow and re-attempt f/u at a later time.     Fernando Cruz M.S. [facility]  Speech-language pathologist  [phone]

## 2021-07-26 NOTE — ED PROVIDER NOTES
Emergency Department Provider Note     Location: [place] [address] - MED SURG  7/20/2021    I independently performed a history and physical on [employer]. All diagnostic, treatment, and disposition decisions were made by myself in conjunction with the mid-level provider. Briefly, this is a 70 y.o. male here for nausea and syncope     ED Triage Vitals   BP Temp Temp Source Pulse Resp SpO2 Height Weight   07/20/21 1655 07/20/21 1806 07/20/21 1806 07/20/21 1655 07/20/21 1655 07/20/21 1655 07/21/21 1500 07/21/21 1500   (!) 205/130 97.8 °F (36.6 °C) Tympanic 98 18 99 % 5' 9\" (1.753 m) 161 lb (73 kg)        Patient resting comfortably in no acute distress. Heart is regular rate and rhythm. Lungs clear to auscultation bilaterally. Abdomen is soft, nondistended, and nontender. No peripheral edema noted. Patient seen and examined. EKG  The Ekg interpreted by me in the absence of a cardiologist shows. normal sinus rhythm with a rate of 80  Axis is   Normal  QTc is  510  Intervals and Durations are unremarkable. LVH  Septal infarct age indeterminate     No specific ST-T wave changes appreciated. No evidence of acute ischemia. No results found.       Results for orders placed or performed during the hospital encounter of 07/20/21   CBC auto differential   Result Value Ref Range    WBC 10.5 4.0 - 11.0 K/uL    RBC 4.91 4.20 - 5.90 M/uL    Hemoglobin 16.2 13.5 - 17.5 g/dL    Hematocrit 46.2 40.5 - 52.5 %    MCV 94.1 80.0 - 100.0 fL    MCH 33.1 26.0 - 34.0 pg    MCHC 35.2 31.0 - 36.0 g/dL    RDW 13.4 12.4 - 15.4 %    Platelets 728 035 - 439 K/uL    MPV 6.6 5.0 - 10.5 fL    Neutrophils % 88.1 %    Lymphocytes % 6.9 %    Monocytes % 4.1 %    Eosinophils % 0.2 %    Basophils % 0.7 %    Neutrophils Absolute 9.2 (H) 1.7 - 7.7 K/uL    Lymphocytes Absolute 0.7 (L) 1.0 - 5.1 K/uL    Monocytes Absolute 0.4 0.0 - 1.3 K/uL    Eosinophils Absolute 0.0 0.0 - 0.6 K/uL    Basophils Absolute 0.1 0.0 - 0.2 K/uL   Lipase   Result Value Ref Range    Lipase 23.0 13.0 - 60.0 U/L   Troponin   Result Value Ref Range    Troponin <0.01 <0.01 ng/mL   Urinalysis Reflex to Culture    Specimen: Urine, clean catch   Result Value Ref Range    Color, UA Yellow Straw/Yellow    Clarity, UA Clear Clear    Glucose, Ur Negative Negative mg/dL    Bilirubin Urine Negative Negative    Ketones, Urine 15 (A) Negative mg/dL    Specific Gravity, UA 1.015 1.005 - 1.030    Blood, Urine Negative Negative    pH, UA 7.5 5.0 - 8.0    Protein, UA Negative Negative mg/dL    Urobilinogen, Urine 0.2 <2.0 E.U./dL    Nitrite, Urine Negative Negative    Leukocyte Esterase, Urine Negative Negative    Microscopic Examination Not Indicated     Urine Type NotGiven     Urine Reflex to Culture Not Indicated    Ethanol   Result Value Ref Range    Ethanol Lvl None Detected mg/dL   Urine Drug Screen   Result Value Ref Range    Amphetamine Screen, Urine Neg Negative <1000ng/mL    Barbiturate Screen, Ur Neg Negative <200 ng/mL    Benzodiazepine Screen, Urine Neg Negative <200 ng/mL    Cannabinoid Scrn, Ur Neg Negative <50 ng/mL    Cocaine Metabolite Screen, Urine Neg Negative <300 ng/mL    Opiate Scrn, Ur Neg Negative <300 ng/mL    PCP Screen, Urine Neg Negative <25 ng/mL    Methadone Screen, Urine Neg Negative <300 ng/mL    Propoxyphene Scrn, Ur Neg Negative <300 ng/mL    Oxycodone Urine Neg Negative <100 ng/ml    pH, UA 7.5     Drug Screen Comment: see below    Comprehensive Metabolic Panel   Result Value Ref Range    Sodium 134 (L) 136 - 145 mmol/L    Potassium 4.2 3.5 - 5.1 mmol/L    Chloride 92 (L) 99 - 110 mmol/L    CO2 26 21 - 32 mmol/L    Anion Gap 16 3 - 16    Glucose 118 (H) 70 - 99 mg/dL    BUN 10 7 - 20 mg/dL    CREATININE 0.7 (L) 0.8 - 1.3 mg/dL    GFR Non-African American >60 >60    GFR African American >60 >60    Calcium 10.1 8.3 - 10.6 mg/dL    Total Protein 8.0 6.4 - 8.2 g/dL    Albumin 4.8 3.4 - 5.0 g/dL    Albumin/Globulin Ratio 1.5 1.1 - 2.2    Total Bilirubin 0.5 0.0 - 1.0 mg/dL    Alkaline Phosphatase 108 40 - 129 U/L    ALT 21 10 - 40 U/L    AST 36 15 - 37 U/L    Globulin 3.2 g/dL   CBC Auto Differential   Result Value Ref Range    WBC 16.5 (H) 4.0 - 11.0 K/uL    RBC 5.30 4.20 - 5.90 M/uL    Hemoglobin 17.2 13.5 - 17.5 g/dL    Hematocrit 49.2 40.5 - 52.5 %    MCV 92.9 80.0 - 100.0 fL    MCH 32.4 26.0 - 34.0 pg    MCHC 34.9 31.0 - 36.0 g/dL    RDW 13.7 12.4 - 15.4 %    Platelets 994 074 - 230 K/uL    MPV 6.6 5.0 - 10.5 fL    Neutrophils % 89.0 %    Lymphocytes % 4.2 %    Monocytes % 6.1 %    Eosinophils % 0.5 %    Basophils % 0.2 %    Neutrophils Absolute 14.7 (H) 1.7 - 7.7 K/uL    Lymphocytes Absolute 0.7 (L) 1.0 - 5.1 K/uL    Monocytes Absolute 1.0 0.0 - 1.3 K/uL    Eosinophils Absolute 0.1 0.0 - 0.6 K/uL    Basophils Absolute 0.0 0.0 - 0.2 K/uL   SPECIMEN REJECTION   Result Value Ref Range    Rejected Test bmp/mg     Reason for Rejection see below    Basic Metabolic Panel   Result Value Ref Range    Sodium 133 (L) 136 - 145 mmol/L    Potassium 3.5 3.5 - 5.1 mmol/L    Chloride 92 (L) 99 - 110 mmol/L    CO2 23 21 - 32 mmol/L    Anion Gap 18 (H) 3 - 16    Glucose 151 (H) 70 - 99 mg/dL    BUN 13 7 - 20 mg/dL    CREATININE 0.7 (L) 0.8 - 1.3 mg/dL    GFR Non-African American >60 >60    GFR African American >60 >60    Calcium 10.0 8.3 - 10.6 mg/dL   Magnesium   Result Value Ref Range    Magnesium 2.30 1.80 - 2.40 mg/dL   Basic Metabolic Panel   Result Value Ref Range    Sodium 136 136 - 145 mmol/L    Potassium 2.8 (LL) 3.5 - 5.1 mmol/L    Chloride 95 (L) 99 - 110 mmol/L    CO2 27 21 - 32 mmol/L    Anion Gap 14 3 - 16    Glucose 119 (H) 70 - 99 mg/dL    BUN 9 7 - 20 mg/dL    CREATININE 0.6 (L) 0.8 - 1.3 mg/dL    GFR Non-African American >60 >60    GFR African American >60 >60    Calcium 9.5 8.3 - 10.6 mg/dL   CBC Auto Differential   Result Value Ref Range    WBC 13.9 (H) 4.0 - 11.0 K/uL    RBC 4.76 4.20 - 5.90 M/uL    Hemoglobin 15.4 13.5 - 17.5 g/dL    Hematocrit 45.3 40.5 - 52.5 % MCV 95.2 80.0 - 100.0 fL    MCH 32.4 26.0 - 34.0 pg    MCHC 34.0 31.0 - 36.0 g/dL    RDW 13.7 12.4 - 15.4 %    Platelets 006 707 - 528 K/uL    MPV 6.5 5.0 - 10.5 fL    Neutrophils % 85.9 %    Lymphocytes % 7.1 %    Monocytes % 6.9 %    Eosinophils % 0.0 %    Basophils % 0.1 %    Neutrophils Absolute 11.9 (H) 1.7 - 7.7 K/uL    Lymphocytes Absolute 1.0 1.0 - 5.1 K/uL    Monocytes Absolute 1.0 0.0 - 1.3 K/uL    Eosinophils Absolute 0.0 0.0 - 0.6 K/uL    Basophils Absolute 0.0 0.0 - 0.2 K/uL   Magnesium   Result Value Ref Range    Magnesium 2.00 1.80 - 2.40 mg/dL   Aldosterone   Result Value Ref Range    Aldosterone 4.7 ng/dL   Cortisol Total   Result Value Ref Range    Cortisol 30.3 ug/dL   TSH WITH REFLEX TO FT4   Result Value Ref Range    TSH Reflex FT4 4.61 (H) 0.27 - 4.20 uIU/mL   Urine Drug Screen   Result Value Ref Range    Amphetamine Screen, Urine Neg Negative <1000ng/mL    Barbiturate Screen, Ur Neg Negative <200 ng/mL    Benzodiazepine Screen, Urine Neg Negative <200 ng/mL    Cannabinoid Scrn, Ur Neg Negative <50 ng/mL    Cocaine Metabolite Screen, Urine Neg Negative <300 ng/mL    Opiate Scrn, Ur Neg Negative <300 ng/mL    PCP Screen, Urine Neg Negative <25 ng/mL    Methadone Screen, Urine Neg Negative <300 ng/mL    Propoxyphene Scrn, Ur Neg Negative <300 ng/mL    Oxycodone Urine Neg Negative <100 ng/ml    pH, UA 7.0     Drug Screen Comment: see below    Basic Metabolic Panel   Result Value Ref Range    Sodium 132 (L) 136 - 145 mmol/L    Potassium 2.7 (LL) 3.5 - 5.1 mmol/L    Chloride 95 (L) 99 - 110 mmol/L    CO2 26 21 - 32 mmol/L    Anion Gap 11 3 - 16    Glucose 130 (H) 70 - 99 mg/dL    BUN 10 7 - 20 mg/dL    CREATININE <0.5 (L) 0.8 - 1.3 mg/dL    GFR Non-African American >60 >60    GFR African American >60 >60    Calcium 9.4 8.3 - 10.6 mg/dL   CBC Auto Differential   Result Value Ref Range    WBC 10.7 4.0 - 11.0 K/uL    RBC 4.92 4.20 - 5.90 M/uL    Hemoglobin 16.3 13.5 - 17.5 g/dL    Hematocrit 46.3 40.5 - 52.5 %    MCV 94.0 80.0 - 100.0 fL    MCH 33.2 26.0 - 34.0 pg    MCHC 35.3 31.0 - 36.0 g/dL    RDW 13.3 12.4 - 15.4 %    Platelets 739 250 - 817 K/uL    MPV 6.4 5.0 - 10.5 fL    Neutrophils % 79.9 %    Lymphocytes % 10.0 %    Monocytes % 9.7 %    Eosinophils % 0.2 %    Basophils % 0.2 %    Neutrophils Absolute 8.5 (H) 1.7 - 7.7 K/uL    Lymphocytes Absolute 1.1 1.0 - 5.1 K/uL    Monocytes Absolute 1.0 0.0 - 1.3 K/uL    Eosinophils Absolute 0.0 0.0 - 0.6 K/uL    Basophils Absolute 0.0 0.0 - 0.2 K/uL   Magnesium   Result Value Ref Range    Magnesium 2.00 1.80 - 2.40 mg/dL   Basic metabolic panel   Result Value Ref Range    Sodium 132 (L) 136 - 145 mmol/L    Potassium 2.7 (LL) 3.5 - 5.1 mmol/L    Chloride 94 (L) 99 - 110 mmol/L    CO2 25 21 - 32 mmol/L    Anion Gap 13 3 - 16    Glucose 123 (H) 70 - 99 mg/dL    BUN 9 7 - 20 mg/dL    CREATININE <0.5 (L) 0.8 - 1.3 mg/dL    GFR Non-African American >60 >60    GFR African American >60 >60    Calcium 9.4 8.3 - 10.6 mg/dL   T4, Free   Result Value Ref Range    T4 Free 0.9 0.9 - 1.8 ng/dL   Potassium   Result Value Ref Range    Potassium 3.1 (L) 3.5 - 5.1 mmol/L   Basic Metabolic Panel   Result Value Ref Range    Sodium 134 (L) 136 - 145 mmol/L    Potassium 3.3 (L) 3.5 - 5.1 mmol/L    Chloride 97 (L) 99 - 110 mmol/L    CO2 25 21 - 32 mmol/L    Anion Gap 12 3 - 16    Glucose 98 70 - 99 mg/dL    BUN 16 7 - 20 mg/dL    CREATININE 0.7 (L) 0.8 - 1.3 mg/dL    GFR Non-African American >60 >60    GFR African American >60 >60    Calcium 9.1 8.3 - 10.6 mg/dL   CBC Auto Differential   Result Value Ref Range    WBC 8.9 4.0 - 11.0 K/uL    RBC 4.75 4.20 - 5.90 M/uL    Hemoglobin 15.5 13.5 - 17.5 g/dL    Hematocrit 44.7 40.5 - 52.5 %    MCV 93.9 80.0 - 100.0 fL    MCH 32.7 26.0 - 34.0 pg    MCHC 34.8 31.0 - 36.0 g/dL    RDW 13.4 12.4 - 15.4 %    Platelets 763 448 - 959 K/uL    MPV 6.4 5.0 - 10.5 fL    Neutrophils % 68.6 %    Lymphocytes % 20.0 %    Monocytes % 10.0 %    Eosinophils % 1.0 %    Basophils % 0.4 %    Neutrophils Absolute 6.1 1.7 - 7.7 K/uL    Lymphocytes Absolute 1.8 1.0 - 5.1 K/uL    Monocytes Absolute 0.9 0.0 - 1.3 K/uL    Eosinophils Absolute 0.1 0.0 - 0.6 K/uL    Basophils Absolute 0.0 0.0 - 0.2 K/uL   Magnesium   Result Value Ref Range    Magnesium 2.20 1.80 - 2.40 mg/dL   Basic Metabolic Panel   Result Value Ref Range    Sodium 135 (L) 136 - 145 mmol/L    Potassium 3.5 3.5 - 5.1 mmol/L    Chloride 100 99 - 110 mmol/L    CO2 25 21 - 32 mmol/L    Anion Gap 10 3 - 16    Glucose 100 (H) 70 - 99 mg/dL    BUN 14 7 - 20 mg/dL    CREATININE 0.6 (L) 0.8 - 1.3 mg/dL    GFR Non-African American >60 >60    GFR African American >60 >60    Calcium 8.7 8.3 - 10.6 mg/dL   CBC Auto Differential   Result Value Ref Range    WBC 7.9 4.0 - 11.0 K/uL    RBC 4.36 4.20 - 5.90 M/uL    Hemoglobin 14.0 13.5 - 17.5 g/dL    Hematocrit 40.6 40.5 - 52.5 %    MCV 93.2 80.0 - 100.0 fL    MCH 32.1 26.0 - 34.0 pg    MCHC 34.5 31.0 - 36.0 g/dL    RDW 13.4 12.4 - 15.4 %    Platelets 378 748 - 003 K/uL    MPV 7.8 5.0 - 10.5 fL    PLATELET SLIDE REVIEW Adequate     SLIDE REVIEW see below     Neutrophils % 65.8 %    Lymphocytes % 22.6 %    Monocytes % 8.2 %    Eosinophils % 2.7 %    Basophils % 0.7 %    Neutrophils Absolute 5.2 1.7 - 7.7 K/uL    Lymphocytes Absolute 1.8 1.0 - 5.1 K/uL    Monocytes Absolute 0.6 0.0 - 1.3 K/uL    Eosinophils Absolute 0.2 0.0 - 0.6 K/uL    Basophils Absolute 0.1 0.0 - 0.2 K/uL   Magnesium   Result Value Ref Range    Magnesium 1.90 1.80 - 2.40 mg/dL   Basic Metabolic Panel   Result Value Ref Range    Sodium 135 (L) 136 - 145 mmol/L    Potassium 3.1 (L) 3.5 - 5.1 mmol/L    Chloride 99 99 - 110 mmol/L    CO2 27 21 - 32 mmol/L    Anion Gap 9 3 - 16    Glucose 98 70 - 99 mg/dL    BUN 12 7 - 20 mg/dL    CREATININE 0.6 (L) 0.8 - 1.3 mg/dL    GFR Non-African American >60 >60    GFR African American >60 >60    Calcium 9.1 8.3 - 10.6 mg/dL   CBC Auto Differential   Result Value Ref Range    WBC 6.4 4.0 - 11.0 K/uL    RBC 4.25 4.20 - 5.90 M/uL    Hemoglobin 14.0 13.5 - 17.5 g/dL    Hematocrit 39.9 (L) 40.5 - 52.5 %    MCV 93.8 80.0 - 100.0 fL    MCH 33.0 26.0 - 34.0 pg    MCHC 35.1 31.0 - 36.0 g/dL    RDW 13.4 12.4 - 15.4 %    Platelets 217 903 - 660 K/uL    MPV 6.7 5.0 - 10.5 fL    Neutrophils % 64.1 %    Lymphocytes % 21.1 %    Monocytes % 9.3 %    Eosinophils % 4.9 %    Basophils % 0.6 %    Neutrophils Absolute 4.1 1.7 - 7.7 K/uL    Lymphocytes Absolute 1.4 1.0 - 5.1 K/uL    Monocytes Absolute 0.6 0.0 - 1.3 K/uL    Eosinophils Absolute 0.3 0.0 - 0.6 K/uL    Basophils Absolute 0.0 0.0 - 0.2 K/uL   Magnesium   Result Value Ref Range    Magnesium 1.90 1.80 - 2.40 mg/dL   POCT Glucose   Result Value Ref Range    POC Glucose 151 (H) 70 - 99 mg/dl    Performed on ACCU-CHEK    POCT Glucose   Result Value Ref Range    POC Glucose 154 (H) 70 - 99 mg/dl    Performed on ACCU-CHEK    POCT Glucose   Result Value Ref Range    POC Glucose 142 (H) 70 - 99 mg/dl    Performed on ACCU-CHEK    POCT Glucose   Result Value Ref Range    POC Glucose 136 (H) 70 - 99 mg/dl    Performed on ACCU-CHEK    POCT Glucose   Result Value Ref Range    POC Glucose 109 (H) 70 - 99 mg/dl    Performed on ACCU-CHEK    POCT Glucose   Result Value Ref Range    POC Glucose 109 (H) 70 - 99 mg/dl    Performed on ACCU-CHEK    POCT Glucose   Result Value Ref Range    POC Glucose 104 (H) 70 - 99 mg/dl    Performed on ACCU-CHEK    POCT Glucose   Result Value Ref Range    POC Glucose 106 (H) 70 - 99 mg/dl    Performed on ACCU-CHEK    POCT Glucose   Result Value Ref Range    POC Glucose 104 (H) 70 - 99 mg/dl    Performed on ACCU-CHEK    POCT Glucose   Result Value Ref Range    POC Glucose 114 (H) 70 - 99 mg/dl    Performed on ACCU-CHEK    POCT Glucose   Result Value Ref Range    POC Glucose 134 (H) 70 - 99 mg/dl    Performed on ACCU-CHEK    POCT Glucose   Result Value Ref Range    POC Glucose 111 (H) 70 - 99 mg/dl    Performed on ACCU-CHEK    POCT Glucose Result Value Ref Range    POC Glucose 111 (H) 70 - 99 mg/dl    Performed on ACCU-CHEK    POCT Glucose   Result Value Ref Range    POC Glucose 96 70 - 99 mg/dl    Performed on ACCU-CHEK    POCT Glucose   Result Value Ref Range    POC Glucose 93 70 - 99 mg/dl    Performed on ACCU-CHEK    POCT Glucose   Result Value Ref Range    POC Glucose 129 (H) 70 - 99 mg/dl    Performed on ACCU-CHEK    POCT Glucose   Result Value Ref Range    POC Glucose 99 70 - 99 mg/dl    Performed on ACCU-CHEK    POCT Glucose   Result Value Ref Range    POC Glucose 121 (H) 70 - 99 mg/dl    Performed on ACCU-CHEK    POCT Glucose   Result Value Ref Range    POC Glucose 91 70 - 99 mg/dl    Performed on ACCU-CHEK    POCT Glucose   Result Value Ref Range    POC Glucose 129 (H) 70 - 99 mg/dl    Performed on ACCU-CHEK    POCT Glucose   Result Value Ref Range    POC Glucose 101 (H) 70 - 99 mg/dl    Performed on ACCU-CHEK    POCT Glucose   Result Value Ref Range    POC Glucose 101 (H) 70 - 99 mg/dl    Performed on ACCU-CHEK    POCT Glucose   Result Value Ref Range    POC Glucose 90 70 - 99 mg/dl    Performed on ACCU-CHEK    POCT Glucose   Result Value Ref Range    POC Glucose 84 70 - 99 mg/dl    Performed on ACCU-CHEK    EKG 12 Lead   Result Value Ref Range    Ventricular Rate 80 BPM    Atrial Rate 80 BPM    P-R Interval 140 ms    QRS Duration 124 ms    Q-T Interval 448 ms    QTc Calculation (Bazett) 516 ms    P Axis 15 degrees    R Axis -42 degrees    T Axis 38 degrees    Diagnosis       Normal sinus rhythmLeft axis deviationLeft ventricular hypertrophy with QRS wideningCannot rule out Septal infarct , age undeterminedAbnormal ECGConfirmed by Annette Nunes MD, Greyson Alcazar ([id number]) on 7/21/2021 5:18:58 PM       For further details of [facility] emergency department encounter, please see Radha's documentation. 1. Syncope and collapse    2. Hypertension, unspecified type    3. Alcohol use    4.  Non-intractable vomiting with nausea, unspecified vomiting

## 2021-07-26 NOTE — PROGRESS NOTES
Hospitalist Progress Note      PCP: No primary care provider on file. Date of Admission: 7/20/2021    Chief Complaint:   Headache/ dysphagia    Hospital Course:   Mehreen Fall a 70 y. o. male with a history of head and neck cancer-apparently in remission. Has had jaw surgery. He was admitted for intractable headache as well as dysphagia to solids more than liquids. He has a history of partial glossectomy and mandibulectomy about seven years ago in 2014 followed by radiation therapy. Renetta Romano states he completed five years of regular follow up and monitoring thereafter. Joyce Ochoa does not seem he has gotten much medical care aside from that. [facility] does not take any prescribed medications. His headache is mostly in the temporal region on the left side more so in the last 3 days. He denies any visual symptoms but has photophobia. He has associated nausea and vomiting. Has had dysphagia to solids as well but needs work-up. He had an esophagogram this morning suggestive of esophagitis with impaired peristalsis. She is due to have an EGD with GI tomorrow.       Subjective:   Headache reduced slightly  Denies nausea or vomiting  No visual symptoms  Reduced urine output      Medications:  Reviewed    Infusion Medications    sodium chloride 25 mL (07/26/21 0940)     Scheduled Medications    potassium bicarb-citric acid  40 mEq Oral Once    amLODIPine  5 mg Oral Daily    cloNIDine  1 patch Transdermal Weekly    pantoprazole  40 mg Intravenous Daily    [Held by provider] enoxaparin  40 mg Subcutaneous Daily     PRN Meds: potassium chloride, potassium chloride **OR** potassium alternative oral replacement **OR** potassium chloride, butalbital-acetaminophen-caffeine, metoprolol, promethazine, promethazine, sodium chloride flush, sodium chloride, magnesium sulfate, acetaminophen **OR** acetaminophen, hydrALAZINE      Intake/Output Summary (Last 24 hours) at 7/26/2021 1723  Last data filed at 7/26/2021 1727  Gross per 24 hour   Intake 300 ml   Output 1875 ml   Net -1575 ml       Physical Exam Performed:    BP (!) 148/87   Pulse 81   Temp 97.6 °F (36.4 °C) (Oral)   Resp 12   Ht 5' 9\" (1.753 m)   Wt 160 lb 1.6 oz (72.6 kg)   SpO2 97%   BMI 23.64 kg/m²     General appearance: No apparent distress, appears stated age and cooperative. HEENT: Glossectomised, eccentric/ exposed leonor/dry. Pupils equal, round, and reactive to light. Conjunctivae/corneas clear. Neck: Supple, with full range of motion. No jugular venous distention. Trachea midline. Respiratory:  Normal respiratory effort. Clear to auscultation, bilaterally without Rales/Wheezes/Rhonchi. Cardiovascular: Regular rate and rhythm with normal S1/S2 without murmurs, rubs or gallops. Abdomen: Soft, non-tender, non-distended with normal bowel sounds. Musculoskeletal: No clubbing, cyanosis or edema bilaterally. Full range of motion without deformity. Skin: Skin color, texture, turgor normal.  No rashes or lesions. Neurologic:  Neurovascularly intact without any focal sensory/motor deficits. Cranial nerves: II-XII intact, grossly non-focal.  Psychiatric: Alert and oriented, thought content appropriate, normal insight  Capillary Refill: Brisk,3 seconds, normal   Peripheral Pulses: +2 palpable, equal bilaterally       Labs:   Recent Labs     07/24/21  0600 07/25/21  0728 07/26/21  0525   WBC 8.9 7.9 6.4   HGB 15.5 14.0 14.0   HCT 44.7 40.6 39.9*    185 191     Recent Labs     07/24/21  0600 07/25/21  0728 07/26/21  0525   * 135* 135*   K 3.3* 3.5 3.1*   CL 97* 100 99   CO2 25 25 27   BUN 16 14 12   CREATININE 0.7* 0.6* 0.6*   CALCIUM 9.1 8.7 9.1     No results for input(s): AST, ALT, BILIDIR, BILITOT, ALKPHOS in the last 72 hours. No results for input(s): INR in the last 72 hours. No results for input(s): Shellia Bugler in the last 72 hours.     Urinalysis:      Lab Results   Component Value Date    NITRU Negative 07/20/2021    BLOODU Negative 07/20/2021    SPECGRAV 1.015 07/20/2021    GLUCOSEU Negative 07/20/2021    GLUCOSEU Neg 03/25/2010       Radiology:  VL Renal Arterial Duplex Complete   Final Result      MRI BRAIN WO CONTRAST   Final Result   1. No acute intracranial abnormality. No acute infarct. 2. There is global parenchymal volume loss with mild chronic microvascular   ischemic changes. 3.  The ventricular dilatation is out of proportion to the cortical sulci. A   component of normal pressure hydrocephalus cannot be excluded. FL ESOPHAGRAM   Final Result   1. Suboptimal study due to patient's limited ability to cooperate. 2. Esophageal dysmotility with disordered primary peristalsis. 3. Findings suggestive of smooth luminal narrowing of the distal esophagus in   the region of the GE junction. Finding could be on the basis of reflux   esophagitis. Other etiologies cannot be excluded based on this examination. CT ABDOMEN PELVIS W IV CONTRAST Additional Contrast? None   Final Result   1. Wall thickening of the visualized esophagus. Infection versus   inflammation. Clinical follow-up recommended. 2. No appendicitis, diverticulitis, or small bowel obstruction. 3. Other findings as described. CT Head WO Contrast   Final Result   Age-related involutional changes with no acute intracranial abnormality. XR CHEST (2 VW)   Final Result   No radiographic evidence of acute pulmonary disease. ASSESSMENT & PLAN  1. Hypertensive urgency-blood pressure profile not optimally controlled . Metabolic encephalopathy ruled out .  -Cont Lopressor 5mg iv q6 prn for BP >160/95mmHg  -Continue on clonidine 0.1mg/d patch  -DC Clonidine patch if BP<110mmHg  -Norvasc 5 mg daily added    2. Hypokalemia, slow to correct  -Check mag  -KCl 20mEq iv x 4 doses q4  -Check K level after 3rd dose  -Fluid resus  -Monitor urine output    3. Dysphagia to solids more than liquids associated with Nausea/Vomiting. EGD-showed a stricture with erosions . biopsies taken . We will follow up to get biopsy results . - Continue fluid resuscitation   -Continue IV protonix.    -Use antiemetics prn.  -Can be discharged tomorrow if okay with GI     4. Headache-bothersome-resolved. Unknown etiology yet. MRI head was suggestive of normal pressure hydrocephalus without clinical features of NPH. Resolved.   -Continue Fioricet prn       DVT prophylaxis:        SCDs, lovenox  Code Status:               Full  Disposition:                 Inpatient     PT/OT Eval Status: Lovenox     Dispo -keep as an inpatient  Haily Collier MD

## 2021-07-26 NOTE — PROGRESS NOTES
Physician Progress Note      PATIENT:               Jossue Connors  [facility] #:                  [id number]  :                       1950  ADMIT DATE:       2021 4:44 PM  [address] [place] [place] DATE:  RESPONDING  PROVIDER #:        Marilu Roach MD          QUERY TEXT:    Patient admitted with Dysphagia/esophagitis. Noted documentation of Metabolic   Encephalopathy in Neurology Consult note . In order to support the   diagnosis of Metabolic Encephalopathy, please include additional clinical   indicators in your documentation. Or please document if the diagnosis of ***   has been ruled out after further study. The medical record reflects the following:  Risk Factors: Hypokalemia/hyponatremia, dehydration, NPH  Clinical Indicators: Na+ levels- 132-136 during admission, K+ dropping to 2.8   from 4.2 on arrival, Reported Headache, HTN urgency with /130 on   arrival, MRI- suggestive of NPH, resolved. Per H&P- \"Age appropriate male in   NAD but did vomit during interview . Level of Consciousness: alert. Orientation: full\"  Treatment: Neuro consult, MRI, labs, ongoing supportive care/monitoring. Thank you,  Delilah Urbina RN, BSN, [facility]  [phone]  Options provided:  -- Metabolic Encephalopathy present as evidenced by, Please document evidence. -- Metabolic Encephalopathy was ruled out  -- Other - I will add my own diagnosis  -- Disagree - Not applicable / Not valid  -- Disagree - Clinically unable to determine / Unknown  -- Refer to Clinical Documentation Reviewer    PROVIDER RESPONSE TEXT:    Metabolic Encephalopathy was ruled out after study.     Query created by: Vicente Slaughter on 2021 4:19 PM      Electronically signed by:  Marilu Roach MD 2021 5:28 PM

## 2021-07-26 NOTE — PROGRESS NOTES
mg, Intravenous, Q6H PRN  cloNIDine (CATAPRES) 0.1 MG/24HR 1 patch, 1 patch, Transdermal, Weekly  promethazine (PHENERGAN) injection 12.5 mg, 12.5 mg, Intravenous, Q4H PRN  promethazine (PHENERGAN) tablet 12.5 mg, 12.5 mg, Oral, Q4H PRN  pantoprazole (PROTONIX) injection 40 mg, 40 mg, Intravenous, Daily  sodium chloride flush 0.9 % injection 10 mL, 10 mL, Intravenous, PRN  0.9 % sodium chloride infusion, 25 mL, Intravenous, PRN  magnesium sulfate 1000 mg in dextrose 5% 100 mL IVPB, 1,000 mg, Intravenous, PRN  acetaminophen (TYLENOL) tablet 650 mg, 650 mg, Oral, Q6H PRN **OR** acetaminophen (TYLENOL) suppository 650 mg, 650 mg, Rectal, Q6H PRN  [Held by provider] enoxaparin (LOVENOX) injection 40 mg, 40 mg, Subcutaneous, Daily  hydrALAZINE (APRESOLINE) injection 10 mg, 10 mg, Intravenous, Q6H PRN       Vitals :     Vitals:    07/26/21 0815   BP: 128/67   Pulse: 73   Resp: 18   Temp: 97.6 °F (36.4 °C)   SpO2: 96%       I & O :       Intake/Output Summary (Last 24 hours) at 7/26/2021 5019  Last data filed at 7/26/2021 7447  Gross per 24 hour   Intake 240 ml   Output 2250 ml   Net -2010 ml        Physical Examination :     General appearance: Anxious- no, distressed- no, in good spirits-  No, symmetric face  HEENT: Lips- normal, teeth- ok , oral mucosa- moist  Neck : Mass- no, appears symmetrical, JVD- not visible  Respiratory: Respiratory effort-normal, wheeze- no, crackles -none  Cardiovascular:  Ausculation- No M/R/G, Edema none  Abdomen: visible mass- no, distention- no, scar- no, tenderness- no                            hepatosplenomegaly-  no  Musculoskeletal:  clubbing no,cyanosis- no , digital ischemia- no                           muscle strength- grossly normal , tone - grossly normal  Skin: rashes- no , ulcers- no, induration- no, tightening - no  Psychiatric:  Judgement and insight- normal           AAO X 3  Additional finding:-     LABS:     Recent Labs     07/24/21  0600 07/25/21  0728 07/26/21  0525   WBC 8.9 7.9 6.4   HGB 15.5 14.0 14.0   HCT 44.7 40.6 39.9*    185 191     Recent Labs     07/24/21  0600 07/25/21  0728 07/26/21  0525   * 135* 135*   K 3.3* 3.5 3.1*   CL 97* 100 99   CO2 25 25 27   BUN 16 14 12   CREATININE 0.7* 0.6* 0.6*   GLUCOSE 98 100* 98   MG 2.20 1.90 1.90

## 2021-07-26 NOTE — OP NOTE
Operative Note      Patient: Keith Tatum  YOB: 1950  MRN: [id number]    Date of Procedure: 2021    Pre-Op Diagnosis: Dysphagia. Distal esophageal narrowing    Post-Op Diagnosis: Moderately tight stricture at the upper esophageal sphincter-allowed the insertion of the scope without dilation. Grade B esophagitis involving the lower one fourth of the esophagus. Procedure(s):  EGD BIOPSY    Surgeon(s):  Cierra Hartley MD    Assistant:   * No surgical staff found *    Anesthesia: General    Estimated Blood Loss (mL): Minimal    Complications: None    Specimens:   ID Type Source Tests Collected by Time Destination   A :  Tissue Tissue SURGICAL PATHOLOGY Cierra Hartley MD 2021 1006    B :  Tissue Tissue SURGICAL PATHOLOGY Cierra Hartley MD 2021 1008        Implants:  * No implants in log *      Drains: * No LDAs found *    [address] ,  [address] [place] [address]  Phone: [phone] [facility],   [address], [address] [facility]  Phone: [phone]    EGD Procedure Note    Patient: Keith Tatum  : 1950    Procedure: Esophagogastroduodenoscopy with Bx    Date:  2021     Endoscopist:  Cierra Hartley MD, MD    Referring Physician:  No primary care provider on file. Anesthesia: Anesthesia: MAC    Procedure Details  The patient was placed in the left lateral decubitus position. A bite block was placed. The patient was monitored with ECG tracing, pulse oximetry, blood pressure monitoring, and direct observation. An upper endoscope was inserted through the bite into the mouth and advanced under direct vision to into the esophagus and gradually to the duodenum. A careful inspection was made during the procedure including a retroflexed view of the proximal stomach including views of the incisura and cardia. The findings and interventions are described below. Findings:    Moderately tight stricture at the upper esophageal sphincter-allowed the insertion of the scope without dilation. It was biopsied at the time of withdrawal the scope near completion of the procedure. During withdrawal of the scope hypopharynx was examined briefly. Larynx showed evidence of mucosal edema on arytenoid cartilages. Grade B esophagitis involving the lower one fourth of the esophagus. White exudate was seen covering the esophagus. There was irregular Z-line - suspicious for a short-segment Lance's. Biopsies were taken. A sliding hiatal hernia was noted extending from the GE junction located at 30 cm to 35 cm where diaphragmatic impression was seen. Stomach:   Unremarkable gastric mucosa. On retroverted view Hill  type II sliding hiatal hernia was noted. Duodenum: Normal mucosa    Complications/ adverse events:  None. Estimated Blood loss:  <10 ml    Disposition:   PACU - hemodynamically stable. Discharge from PACU  after appropriate period of observation and when criteria for discharge are met. Recommendations:  Continue acid suppressive therapy and anti-reflux measures and life-style changes. Avoid irritants to the stomach such as NSAIDs and aspirin. Await pathology report. Once it is available, interpretation and further recommendations will be sent. A repeat EGD is recommended in 12 weeks although high. Is anybody coming right after me clear   IMPORTANT: Please note that some portions of this note may have been created using Dragon voice recognition software. Some \"sound-alike\" and totally wrong word substitutions may have taken place due to known inherent limitations of any such software, including this voice recognition software. In spite of efforts to eliminate such errors, some may not have been corrected. So please read the note with this in mind and recognize such mistakes and understand the correct version using the  context.  If there are still uncertainties in the mind of the medical provider reading this note about any aspect of the note, the provider can feel free to contact me. Thanks.     Lotus yoder  Electronically signed by Key Florentino MD on 7/26/2021 at 10:12 AM

## 2021-07-27 VITALS
HEART RATE: 67 BPM | BODY MASS INDEX: 23.7 KG/M2 | TEMPERATURE: 97.9 F | DIASTOLIC BLOOD PRESSURE: 74 MMHG | HEIGHT: 69 IN | WEIGHT: 160 LBS | OXYGEN SATURATION: 98 % | RESPIRATION RATE: 16 BRPM | SYSTOLIC BLOOD PRESSURE: 134 MMHG

## 2021-07-27 LAB
ANION GAP SERPL CALCULATED.3IONS-SCNC: 12 MMOL/L (ref 3–16)
BASOPHILS ABSOLUTE: 0 K/UL (ref 0–0.2)
BASOPHILS RELATIVE PERCENT: 0.4 %
BUN BLDV-MCNC: 11 MG/DL (ref 7–20)
CALCIUM SERPL-MCNC: 9.2 MG/DL (ref 8.3–10.6)
CHLORIDE BLD-SCNC: 97 MMOL/L (ref 99–110)
CO2: 25 MMOL/L (ref 21–32)
CREAT SERPL-MCNC: <0.5 MG/DL (ref 0.8–1.3)
EOSINOPHILS ABSOLUTE: 0.2 K/UL (ref 0–0.6)
EOSINOPHILS RELATIVE PERCENT: 2.7 %
GFR AFRICAN AMERICAN: >60
GFR NON-AFRICAN AMERICAN: >60
GLUCOSE BLD-MCNC: 100 MG/DL (ref 70–99)
GLUCOSE BLD-MCNC: 104 MG/DL (ref 70–99)
GLUCOSE BLD-MCNC: 112 MG/DL (ref 70–99)
GLUCOSE BLD-MCNC: 97 MG/DL (ref 70–99)
HCT VFR BLD CALC: 42.1 % (ref 40.5–52.5)
HEMOGLOBIN: 14.6 G/DL (ref 13.5–17.5)
LYMPHOCYTES ABSOLUTE: 1.1 K/UL (ref 1–5.1)
LYMPHOCYTES RELATIVE PERCENT: 14.7 %
MAGNESIUM: 2 MG/DL (ref 1.8–2.4)
MCH RBC QN AUTO: 32.4 PG (ref 26–34)
MCHC RBC AUTO-ENTMCNC: 34.7 G/DL (ref 31–36)
MCV RBC AUTO: 93.3 FL (ref 80–100)
MONOCYTES ABSOLUTE: 0.6 K/UL (ref 0–1.3)
MONOCYTES RELATIVE PERCENT: 8.5 %
NEUTROPHILS ABSOLUTE: 5.3 K/UL (ref 1.7–7.7)
NEUTROPHILS RELATIVE PERCENT: 73.7 %
PDW BLD-RTO: 13.5 % (ref 12.4–15.4)
PERFORMED ON: ABNORMAL
PERFORMED ON: ABNORMAL
PERFORMED ON: NORMAL
PLATELET # BLD: 210 K/UL (ref 135–450)
PMV BLD AUTO: 6.6 FL (ref 5–10.5)
POTASSIUM SERPL-SCNC: 3.4 MMOL/L (ref 3.5–5.1)
RBC # BLD: 4.52 M/UL (ref 4.2–5.9)
SODIUM BLD-SCNC: 134 MMOL/L (ref 136–145)
WBC # BLD: 7.2 K/UL (ref 4–11)

## 2021-07-27 PROCEDURE — 97163 PT EVAL HIGH COMPLEX 45 MIN: CPT

## 2021-07-27 PROCEDURE — 36415 COLL VENOUS BLD VENIPUNCTURE: CPT

## 2021-07-27 PROCEDURE — C9113 INJ PANTOPRAZOLE SODIUM, VIA: HCPCS | Performed by: INTERNAL MEDICINE

## 2021-07-27 PROCEDURE — 85025 COMPLETE CBC W/AUTO DIFF WBC: CPT

## 2021-07-27 PROCEDURE — 6360000002 HC RX W HCPCS: Performed by: INTERNAL MEDICINE

## 2021-07-27 PROCEDURE — 97110 THERAPEUTIC EXERCISES: CPT

## 2021-07-27 PROCEDURE — 83735 ASSAY OF MAGNESIUM: CPT

## 2021-07-27 PROCEDURE — 80048 BASIC METABOLIC PNL TOTAL CA: CPT

## 2021-07-27 PROCEDURE — 97530 THERAPEUTIC ACTIVITIES: CPT

## 2021-07-27 PROCEDURE — 97166 OT EVAL MOD COMPLEX 45 MIN: CPT

## 2021-07-27 PROCEDURE — 6370000000 HC RX 637 (ALT 250 FOR IP): Performed by: INTERNAL MEDICINE

## 2021-07-27 PROCEDURE — 97116 GAIT TRAINING THERAPY: CPT

## 2021-07-27 RX ORDER — POTASSIUM CHLORIDE 20 MEQ/1
40 TABLET, EXTENDED RELEASE ORAL ONCE
Status: COMPLETED | OUTPATIENT
Start: 2021-07-27 | End: 2021-07-27

## 2021-07-27 RX ORDER — AMLODIPINE BESYLATE 5 MG/1
10 TABLET ORAL DAILY
Qty: 30 TABLET | Refills: 3 | Status: SHIPPED | OUTPATIENT
Start: 2021-07-28

## 2021-07-27 RX ORDER — BUTALBITAL, ACETAMINOPHEN AND CAFFEINE 50; 325; 40 MG/1; MG/1; MG/1
1 TABLET ORAL EVERY 4 HOURS PRN
Qty: 180 TABLET | Refills: 3 | Status: SHIPPED | OUTPATIENT
Start: 2021-07-27

## 2021-07-27 RX ORDER — AMLODIPINE BESYLATE 5 MG/1
5 TABLET ORAL DAILY
Qty: 30 TABLET | Refills: 3 | Status: SHIPPED | OUTPATIENT
Start: 2021-07-28 | End: 2021-07-27

## 2021-07-27 RX ORDER — METOPROLOL TARTRATE 5 MG/5ML
5 INJECTION INTRAVENOUS EVERY 12 HOURS
Qty: 15 ML | Refills: 3 | Status: SHIPPED
Start: 2021-07-27 | End: 2021-07-27 | Stop reason: HOSPADM

## 2021-07-27 RX ORDER — METOPROLOL TARTRATE 50 MG/1
25 TABLET, FILM COATED ORAL 2 TIMES DAILY
Qty: 60 TABLET | Refills: 3 | Status: SHIPPED | OUTPATIENT
Start: 2021-07-27

## 2021-07-27 RX ORDER — CLONIDINE 0.1 MG/24H
1 PATCH, EXTENDED RELEASE TRANSDERMAL WEEKLY
Qty: 4 PATCH | Refills: 3 | Status: SHIPPED | OUTPATIENT
Start: 2021-07-29

## 2021-07-27 RX ORDER — DOCUSATE SODIUM 100 MG/1
100 CAPSULE, LIQUID FILLED ORAL 2 TIMES DAILY PRN
Qty: 60 CAPSULE | Refills: 0 | Status: SHIPPED | OUTPATIENT
Start: 2021-07-27

## 2021-07-27 RX ADMIN — AMLODIPINE BESYLATE 5 MG: 5 TABLET ORAL at 09:32

## 2021-07-27 RX ADMIN — PANTOPRAZOLE SODIUM 40 MG: 40 INJECTION, POWDER, FOR SOLUTION INTRAVENOUS at 09:32

## 2021-07-27 RX ADMIN — POTASSIUM CHLORIDE 40 MEQ: 20 TABLET, EXTENDED RELEASE ORAL at 17:11

## 2021-07-27 RX ADMIN — HYDRALAZINE HYDROCHLORIDE 10 MG: 20 INJECTION INTRAMUSCULAR; INTRAVENOUS at 11:48

## 2021-07-27 RX ADMIN — POTASSIUM BICARBONATE 40 MEQ: 782 TABLET, EFFERVESCENT ORAL at 11:42

## 2021-07-27 ASSESSMENT — PAIN SCALES - GENERAL
PAINLEVEL_OUTOF10: 0

## 2021-07-27 NOTE — PROGRESS NOTES
Speech Language Pathology    SLP has been following pt for dysphagia. Pt is currently on M&M diet w/ thin liquids. Per previous SLP treatment note 7/23/21: \"Recommend continuing pt's current diet with consideration of MBSS to instrumentally assess the pharyngeal phase of the swallow and correlate clinical findings. ST to continue to follow. \" Pt has not yet been medically appropriate for MBSS during admission. Pt has d/c order in this date; pt would benefit from outpatient MBSS. Michelle Magallanes M.A.  [address] [facility]  Speech Language Pathologist

## 2021-07-27 NOTE — PROGRESS NOTES
Speech Language Pathology  Attempt Note      Name: Keith Tatum  : 1950  Medical Diagnosis: Headache [R51.9]     SLP attempted to see pt for dysphagia f/u. Pt currently working with PT/OT. SLP to re-attempt as pt's schedule allows. No charges.     Thank you,    Lin Coyle MA [address] [facility]  Speech Language Pathologist

## 2021-07-27 NOTE — PROGRESS NOTES
Physical Therapy    Facility/Department: [facility] B3 - MED SURG  Initial Assessment    NAME: Glendy Goodman  : 1950  MRN: [id number]    Date of Service: 2021    Discharge Recommendations:  Subacute/Skilled Nursing Facility   PT Equipment Recommendations  Equipment Needed: No  Other: defer to next facility    Assessment   Body structures, Functions, Activity limitations: Decreased functional mobility ; Decreased ROM; Decreased strength;Decreased safe awareness;Decreased endurance;Decreased balance;Decreased ADL status; Decreased coordination;Decreased posture  Assessment: Pt was evaluated by PT s/p syncope 21. Pt was pleasant and agreeable to therapy. Pt reported not eating any breakfast or lunch due to emesis < 50ccs. RN notified. Pt transferred to/from toilet/chair/bed all requiring 2 person mod A for balance and stability. Pt struggled with walker management as well as recognizing center of mass. Pt only able to ambulate 10ft due to poor performance, fatigue, and decreased activity tolerance. Due to the barriers previously mentioned, the pt requires skilled acute care PT. PT recommends d/c to SNF. Treatment Diagnosis: decreased functional mobility, decreased strength, decreased balance  Specific instructions for Next Treatment: gait training, balance  Prognosis: Good  Decision Making: High Complexity  PT Education: Goals; General Safety;Gait Training;Disease Specific Education;PT Role;Plan of Care; Functional Mobility Training;Home Exercise Program;Transfer Training;Energy Conservation  Patient Education: disease specific education- pt verbalized understanding of the importance of OOB activity  REQUIRES PT FOLLOW UP: Yes  Activity Tolerance  Activity Tolerance: Patient Tolerated treatment well;Patient limited by fatigue  Activity Tolerance: sitting EOB /63, HR 76, SpO2 99%; sitting after 10min /65       Patient Diagnosis(es): The primary encounter diagnosis was Syncope and collapse.  Diagnoses of Hypertension, unspecified type, Alcohol use, Non-intractable vomiting with nausea, unspecified vomiting type, and Generalized abdominal cramping were also pertinent to this visit. has a past medical history of Cancer ([address] Radiation, and S/P chemotherapy, time since greater than 12 weeks. has a past surgical history that includes other surgical history; eye surgery (Right, 12/26/13); and Upper gastrointestinal endoscopy (N/A, 7/26/2021). Restrictions  Restrictions/Precautions  Restrictions/Precautions: General Precautions, Up as Tolerated, Fall Risk  Required Braces or Orthoses?: No  Vision/Hearing  Vision: Impaired  Vision Exceptions: Wears glasses for reading  Hearing: Within functional limits     Subjective  General  Chart Reviewed: Yes  Patient assessed for rehabilitation services?: Yes  Response To Previous Treatment: Not applicable  Referring Practitioner: Lam Palumbo MD  Referral Date : 07/26/21  Follows Commands: Within Functional Limits  General Comment  Comments: Pt was lying in bed on arrival- RN cleared for therapy  Subjective  Subjective: Pt was pleasant and agreeable to therapy. Pt notes no pain but general weakness.   Pain Screening  Patient Currently in Pain: Denies  Vital Signs  Pulse: 87  BP: (!) 165/86  Patient Currently in Pain: Denies  Oxygen Therapy  SpO2: 97 %  Pulse Oximeter Device Mode: Continuous  Pulse Oximeter Device Location: Finger    Orientation  Orientation  Overall Orientation Status: Within Normal Limits  Social/Functional History  Social/Functional History  Lives With: Family (brother(works))  Type of Home: Trailer  Home Layout: One level  Home Access: Ramped entrance  Entrance Stairs - Rails: Both  Bathroom Shower/Tub: Tub/Shower unit  Bathroom Toilet: Standard  Bathroom Equipment: Tub transfer bench  Bathroom Accessibility: Accessible  Home Equipment: Rolling walker, Wheelchair-manual, Quad cane  Receives Help From: Family (states that sibilings can care)  ADL Assistance: Independent  Homemaking Assistance: Independent  Homemaking Responsibilities: Yes  Ambulation Assistance: Independent (used walker inside, cane outside)  Transfer Assistance: Independent  Active : Yes  Mode of Transportation: Car  Occupation: Retired  Type of occupation: welding  Leisure & Hobbies: TV, now grass riding  Additional Comments: questionable historian about level of [place] PTA  Objective     Observation/Palpation  Posture: Fair    AROM RLE (degrees)  RLE AROM: WFL  AROM LLE (degrees)  LLE AROM : WFL  Strength RLE  Comment: grossly 4+/5 for all but hamstrings 3+/5  Strength LLE  Comment: grossly 4+/5 for all but hamstrings 3+/5  Tone RLE  RLE Tone: Normotonic  Tone LLE  LLE Tone: Normotonic  Motor Control  Gross Motor?: WNL  Coordination  Rapid Alternating Movements: Normal  Sensation  Overall Sensation Status: WNL  Bed mobility  Supine to Sit: 2 Person assistance (2 person min A)  Sit to Supine: Unable to assess (Left up in chair upon exiting, pt agreeable)  Transfers  Sit to Stand: Moderate Assistance;2 Person Assistance  Stand to sit: 2 Person Assistance; Moderate Assistance  Bed to Chair: Moderate assistance;2 Person Assistance  Comment: Pt required 2 person mod A for transfers due to posterior center of mass and backward leaning  Ambulation  Ambulation?: Yes  More Ambulation?: Yes  Ambulation 1  Surface: level tile  Device: [address]: 2 Person assistance; Moderate assistance  Quality of Gait: staggered, unsteady  Gait Deviations: Staggers; Slow Gabrielle;Decreased step length;Decreased step height;Decreased arm swing  Distance: 10ft  Comments: In order to assess functional capacity for sustained activity, improve LE strength, and assess balance, the pt participated in gait trianing. Pt required 2 person mod A to ambulate with RW.  Pt demonstrated thoracic kyphosis, forward flexed posture, posterior center of mass, stasggering gait, decreased gait speed, poor walker management, decreased step height. Balance  Posture: Fair  Sitting - Static: Fair  Sitting - Dynamic: Poor  Standing - Static: Poor;-  Standing - Dynamic: Poor;-  Exercises  Hip Flexion: x 15 BLE  Knee Long Arc Quad: x 15 BLE  Ankle Pumps: x 15 BLE     Plan   Plan  Times per week: 3-5 x/wk  Times per day: Daily  Plan weeks: 1 week (8/3/21)  Specific instructions for Next Treatment: gait training, balance  Current Treatment Recommendations: Strengthening, ROM, Balance Training, Functional Mobility Training, Transfer Training, Stair training, Gait Training, Endurance Training, Home Exercise Program, Pain Management, Patient/Caregiver Education & Training, ADL/Self-care Training  Safety Devices  Type of devices:  All fall risk precautions in place, Left in chair, Nurse notified, Gait belt, Chair alarm in place, Call light within reach, Patient at risk for falls  Restraints  Initially in place: No    AM-PAC Score  AM-PAC Inpatient Mobility Raw Score : 13 (07/27/21 1544)  AM-PAC Inpatient T-Scale Score : 36.74 (07/27/21 1544)  Mobility Inpatient CMS 0-100% Score: 64.91 (07/27/21 1544)  Mobility Inpatient CMS G-Code Modifier : CL (07/27/21 1544)    Goals  Short term goals  Time Frame for Short term goals: 1 week (8/3/21)  Short term goal 1: Pt will ambulate 50ft with LRAD and require no more than CGA in order to increase independence in the home  Short term goal 2: Pt will independently perform LE strengthening exercises by 7/29/21  Short term goal 3: Pt will transfer to/from toilet, chair, bed with LRAD and require no more than CGA in order to increase mobility  Patient Goals   Patient goals : I want to go home       Therapy Time   Individual Concurrent Group Co-treatment   Time In 1301         Time Out 1343         Minutes 42         Timed Code Treatment Minutes: 32 Minutes (10 min eval)       MAIKEL Ewing  If pt is unable to be seen after this session, please let this note serve as discharge summary. Please see case management note for discharge disposition. Thank you.

## 2021-07-27 NOTE — PROGRESS NOTES
Pt d/c'd home. Removed peripheral IV and stopped bleeding. Catheter intact. Pt tolerated well. No redness noted at site. Notified CMU and removed tele box. Reviewed d/c instructions, home meds, and  f/u information utilizing teach-back method. Advised pt to  medications from listed pharmacy. Patient verbalized understanding. Pt escorted off of unit via wheelchair with all d/c paperwork and belongings in hand.

## 2021-07-27 NOTE — DISCHARGE INSTR - COC
Continuity of Care Form    Patient Name: Supa PresleyB:  1950  MRN:  [id number]    Admit date:  2021  Discharge date:  21    Code Status Order: Full Code   Advance Directives:      Admitting Physician:  Cyndy Bernal MD  PCP: No primary care provider on file. Discharging Nurse: [facility] Unit/Room#: 2688/9715-22  Discharging Unit Phone Number: [phone]    Emergency Contact:   Extended Emergency Contact Information  Primary Emergency Contact: [address], [address] [facility] Phone: [phone]  Relation: Brother/Sister  Secondary Emergency Contact: Jacqulyn Leyden  Address: [address] [place] [address]  [facility], [address] [place] [address],[address] Phone: [phone]  Relation: Brother/Sister    Past Surgical History:  Past Surgical History:   Procedure Laterality Date    EYE SURGERY Right 13    cataract    OTHER SURGICAL HISTORY      jaw cancer    UPPER GASTROINTESTINAL ENDOSCOPY N/A 2021    EGD BIOPSY performed by Ceferino Alexandre MD at [address] [place] [address]       Immunization History: There is no immunization history on file for this patient.     Active Problems:  Patient Active Problem List   Diagnosis Code    Nondisplaced unspecified condyle fracture of lower end of left femur, initial encounter for closed fracture ([facility] [address] S72.415A    Alcohol abuse F10.10    Lactic acidosis E87.2    Hyponatremia E87.1    Moderate protein-calorie malnutrition (HCC) E44.0    Headache R51.9    Intractable nausea and vomiting R11.2    History of oral cancer Z85.819    Dysphagia R13.10    New persistent daily headache G44.52    NPH (normal pressure hydrocephalus) (HCC) G91.2    HTN (hypertension), benign I10       Isolation/Infection:   Isolation            No Isolation          Patient Infection Status       None to display            Nurse Assessment:  Last Vital Signs: BP (!) 165/86   Pulse 87   Temp 98 °F (36.7 °C) (Oral)   Resp 16  5' 9\" (1.753 m)   Wt 160 lb (72.6 kg)   SpO2 97%   BMI 23.63 kg/m²     Last documented pain score (0-10 scale): Pain Level: 0  Last Weight:   Wt Readings from Last 1 Encounters:   07/27/21 160 lb (72.6 kg)     Mental Status:  oriented, alert, coherent, logical, thought processes intact and able to concentrate and follow conversation    IV Access:  - None    Nursing Mobility/ADLs:  Walking   Assisted  Transfer  Assisted  Bathing  Assisted  Dressing  Independent  Toileting  Independent  Feeding  Independent  Med [address] [place] [address] Delivery   whole    Wound Care Documentation and Therapy:        Elimination:  Continence:   · Bowel: Yes  · Bladder: Yes  Urinary Catheter: None   Colostomy/Ileostomy/Ileal Conduit: No       Date of Last BM: ***    Intake/Output Summary (Last 24 hours) at 7/27/2021 1422  Last data filed at 7/27/2021 0939  Gross per 24 hour   Intake 100 ml   Output 1600 ml   Net -1500 ml     I/O last 3 completed shifts: In: 400 [P.O.:300; I.V.:100]  Out: 2200 [Urine:2200]    Safety Concerns: At Risk for Falls    Impairments/Disabilities:      None    Nutrition Therapy:  Current Nutrition Therapy:   - Oral Diet:  General    Routes of Feeding: Oral  Liquids: Thin Liquids  Daily Fluid Restriction: no  Last Modified Barium Swallow with Video (Video Swallowing Test): not done    Treatments at the Time of Hospital Discharge:   Respiratory Treatments: None  Oxygen Therapy:  is not on home oxygen therapy.   Ventilator:    - No ventilator support    Rehab Therapies: Physical Therapy and Occupational Therapy  Weight Bearing Status/Restrictions: No weight bearing restirctions  Other Medical Equipment (for information only, NOT a DME order):  wheelchair, cane and walker  Other Treatments: None    Patient's personal belongings (please select all that are sent with patient):  None    RN SIGNATURE:  Electronically signed by Quang Jain on 7/27/21 at 4:52 PM EDT    CASE MANAGEMENT/SOCIAL WORK SECTION    Inpatient Status Date: ***    Readmission Risk Assessment Score:  Readmission Risk              Risk of Unplanned Readmission:  11           Discharging to Facility/ Agency   · Name:   · Address:  · Phone:  · Fax:    Dialysis Facility (if applicable)   · Name:  · Address:  · Dialysis Schedule:  · Phone:  · Fax:    / signature: {Esignature:[id number]:::0}    PHYSICIAN SECTION    Prognosis: Good    Condition at Discharge: Stable    Rehab Potential (if transferring to Rehab): Good    Recommended Labs or Other Treatments After Discharge: none    Physician Certification: I certify the above information and transfer of Sammi Louise  is necessary for the continuing treatment of the diagnosis listed and that he requires [address] for less 30 days.      Update Admission H&P: No change in H&P    PHYSICIAN SIGNATURE:  Electronically signed by Andrew Ugarte MD on 7/27/21 at 2:24 PM EDT

## 2021-07-27 NOTE — DISCHARGE SUMMARY
acid (EFFER-K) 20 MEQ TBEF effervescent tablet Take 3 tablets by mouth daily, Disp-120 tablet, R-1Normal      butalbital-acetaminophen-caffeine (FIORICET, ESGIC) -40 MG per tablet Take 1 tablet by mouth every 4 hours as needed for Headaches, Disp-180 tablet, R-3Normal      cloNIDine (CATAPRES) 0.1 MG/24HR PTWK Place 1 patch onto the skin once a week, Disp-4 patch, R-3Normal      metoprolol tartrate (LOPRESSOR) 50 MG tablet Take 0.5 tablets by mouth 2 times daily, Disp-60 tablet, R-3Normal           Discharge Medication List as of 7/27/2021  4:57 PM      CONTINUE these medications which have CHANGED    Details   docusate sodium (COLACE) 100 MG capsule Take 1 capsule by mouth 2 times daily as needed for Constipation, Disp-60 capsule, R-0Normal      amLODIPine (NORVASC) 5 MG tablet Take 2 tablets by mouth daily, Disp-30 tablet, R-3Normal           Discharge Medication List as of 7/27/2021  4:57 PM      CONTINUE these medications which have NOT CHANGED    Details   dextromethorphan-guaiFENesin (MUCINEX DM)  MG per extended release tablet Take 1 tablet by mouth every 12 hours as neededHistorical Med           Discharge Medication List as of 7/27/2021  4:57 PM      STOP taking these medications       metoprolol (LOPRESSOR) 5 MG/5ML SOLN injection Comments:   Reason for Stopping:         HYDROcodone-acetaminophen ([facility]) 5-325 MG per tablet Comments:   Reason for Stopping:               Discharge ROS:  A complete review of systems was asked and negative except for above     Discharge Exam:    /74   Pulse 67   Temp 97.9 °F (36.6 °C) (Oral)   Resp 16   Ht 5' 9\" (1.753 m)   Wt 160 lb (72.6 kg)   SpO2 98%   BMI 23.63 kg/m²     General appearance: No apparent distress, appears stated age and cooperative. HEENT: Glossectomised, eccentric/ exposed leonor/dry. Pupils equal, round, and reactive to light. Conjunctivae/corneas clear. Neck: Supple, with full range of motion. No jugular venous distention. Trachea midline. Respiratory:  Normal respiratory effort. Clear to auscultation, bilaterally without Rales/Wheezes/Rhonchi. Cardiovascular: Regular rate and rhythm with normal S1/S2 without murmurs, rubs or gallops. Abdomen: Soft, non-tender, non-distended with normal bowel sounds. Musculoskeletal: No clubbing, cyanosis or edema bilaterally.  Full range of motion without deformity. Skin: Skin color, texture, turgor normal.  No rashes or lesions. Neurologic:  Neurovascularly intact without any focal sensory/motor deficits. Cranial nerves: II-XII intact, grossly non-focal.  Psychiatric: Alert and oriented, thought content appropriate, normal insight  Capillary Refill: Brisk,3 seconds, normal   Peripheral Pulses: +2 palpable, equal bilaterally     Labs: For convenience and continuity at follow-up the following most recent labs are provided:    Lab Results   Component Value Date    WBC 7.2 07/27/2021    HGB 14.6 07/27/2021    HCT 42.1 07/27/2021    MCV 93.3 07/27/2021     07/27/2021     07/27/2021    K 3.4 07/27/2021    CL 97 07/27/2021    CO2 25 07/27/2021    BUN 11 07/27/2021    CREATININE <0.5 07/27/2021    CALCIUM 9.2 07/27/2021    PHOS 2.6 11/02/2017    ALKPHOS 108 07/20/2021    ALT 21 07/20/2021    AST 36 07/20/2021    BILITOT 0.5 07/20/2021    LABALBU 4.8 07/20/2021     Lab Results   Component Value Date    INR 1.07 11/01/2017       Radiology:  XR CHEST (2 VW)    Result Date: 7/20/2021  EXAMINATION: TWO XRAY VIEWS OF THE CHEST 7/20/2021 5:08 pm COMPARISON: Chest x-ray dated 10/31/2017 HISTORY: ORDERING SYSTEM PROVIDED HISTORY: n/v TECHNOLOGIST PROVIDED HISTORY: Reason for exam:->n/v Reason for Exam: n/v Acuity: Acute Type of Exam: Initial FINDINGS: LINES/TUBES/OTHER: Right IJ port noted with its tip in the right atrium. HEART/MEDIASTINUM: The cardiomediastinal silhouette is within normal limits. PLEURA/LUNGS: There are no focal consolidations or pleural effusions.  There is no appreciable pneumothorax. BONES/SOFT TISSUE: No acute abnormality. No radiographic evidence of acute pulmonary disease. CT Head WO Contrast    Result Date: 7/20/2021  EXAMINATION: CT OF THE HEAD WITHOUT CONTRAST  7/20/2021 6:18 pm TECHNIQUE: CT of the head was performed without the administration of intravenous contrast. Dose modulation, iterative reconstruction, and/or weight based adjustment of the mA/kV was utilized to reduce the radiation dose to as low as reasonably achievable. COMPARISON: None. HISTORY: ORDERING SYSTEM PROVIDED HISTORY: left sided ha FINDINGS: BRAIN/VENTRICLES: There is no acute intracranial hemorrhage, mass effect or midline shift. No abnormal extra-axial fluid collection. The gray-white differentiation is maintained without evidence of an acute infarct. There is prominence of the ventricles and sulci due to global parenchymal volume loss. There are nonspecific areas of hypoattenuation within the periventricular and subcortical white matter, which likely represent chronic microvascular ischemic change. ORBITS: The visualized portion of the orbits demonstrate no acute abnormality. SINUSES: The visualized paranasal sinuses and mastoid air cells demonstrate no acute abnormality. SOFT TISSUES/SKULL: No acute abnormality of the visualized skull or soft tissues. Age-related involutional changes with no acute intracranial abnormality. CT ABDOMEN PELVIS W IV CONTRAST Additional Contrast? None    Result Date: 7/21/2021  EXAMINATION: CT OF THE ABDOMEN AND PELVIS WITH CONTRAST 7/20/2021 11:10 pm TECHNIQUE: CT of the abdomen and pelvis was performed with the administration of intravenous contrast. Multiplanar reformatted images are provided for review. Dose modulation, iterative reconstruction, and/or weight based adjustment of the mA/kV was utilized to reduce the radiation dose to as low as reasonably achievable. COMPARISON: None.  HISTORY: ORDERING SYSTEM PROVIDED HISTORY: ABD pain with n/v TECHNOLOGIST PROVIDED HISTORY: Reason for exam:->ABD pain with n/v Additional Contrast?->None Decision Support Exception - unselect if not a suspected or confirmed emergency medical condition->Emergency Medical Condition (MA) Reason for Exam: Abdominal discomfort, nausea and vomiting, fatigue Acuity: Acute Type of Exam: Initial FINDINGS: Lower Chest: Lung bases demonstrate no focal consolidation or pleural effusion. Scattered atelectasis and granulomas. Calcified lymph nodes in the mediastinum and sheri. Organs: Liver: Unremarkable on this phase of enhancement. Spleen: Unremarkable on this phase of enhancement. Granulomas. Pancreas: Unremarkable on this phase of enhancement. Adrenal glands: Unremarkable on this phase of enhancement. Kidneys: Unremarkable on this phase of enhancement. Suboptimal evaluation for renal calculi due to presence of contrast in the renal collecting systems. No discrete renal, ureteral, or bladder calculi. Gallbladder: Incompletely distended. GI/Bowel: Evaluation of the bowel and mesentery is limited due to lack of enteric contrast. Visualized esophagus/stomach: Small hiatal hernia. Wall thickening of the visualized esophagus. Small bowel: No dilated small bowel. Large bowel: Scattered colonic diverticula without adjacent stranding. Appendix: Normal. Pelvis: Bladder is incompletely distended. Prostate and seminal vesicles appear unremarkable. Peritoneum/Retroperitoneum: Adenopathy: Suboptimal evaluation for adenopathy due to lack of enteric contrast. Abdominal aorta: No abdominal aortic aneurysm. Free fluid/air: No free fluid. No free air. Bones/Soft Tissues: Scattered degenerative changes noted in the visualized spine with spondylolistheses. 1. Wall thickening of the visualized esophagus. Infection versus inflammation. Clinical follow-up recommended. 2. No appendicitis, diverticulitis, or small bowel obstruction. 3. Other findings as described.      FL ESOPHAGRAM    Result Date: 7/21/2021  EXAMINATION: SINGLE CONTRAST ESOPHAGRAM 7/21/2021 10:58 am TECHNIQUE: Double contrast esophagram was performed with barium and air contrast. FLUOROSCOPY DOSE AND TYPE OR TIME AND EXPOSURES: Fluoroscopic time: 0.9 minutes. Number of fluoroscopic images obtained:  0. Fluoroscopic images that were obtained were generated using last image hold technique. COMPARISON: CT of the abdomen/pelvis performed earlier the same day. 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 FINDINGS: Technically, the study is suboptimal due to patient's limited ability to cooperate. The study was performed with patient in semirecumbent position. Evaluation of the hypopharynx is suboptimal due to patient's inability to perform the study in upright position. There is disordered primary peristalsis. Focal area of smooth dilatation of the distal esophagus suggest visualization of prominent phrenic ampulla. There is smooth luminal narrowing of the distal esophagus in the region of the GE junction. No other definite intrinsic or extrinsic abnormality of the esophagus is identified. Barium tablet was not administered due to the patient's clinical state. 1. Suboptimal study due to patient's limited ability to cooperate. 2. Esophageal dysmotility with disordered primary peristalsis. 3. Findings suggestive of smooth luminal narrowing of the distal esophagus in the region of the GE junction. Finding could be on the basis of reflux esophagitis. Other etiologies cannot be excluded based on this examination.      VL Renal Arterial Duplex Complete    Result Date: 7/23/2021  Renal Duplex Study  Demographics   Patient Name       [facility]   Date of Study      07/23/2021         Gender              Male   Patient Number     [id number]         Date of Birth       1950   Visit Number [id number]          Age                 70 year(s)   Accession Number   [id number]         Room Number         0911   Corporate ID       [id number]            Sonographer         Bina Morgan RDMS, RVT   Ordering Physician [facility]                     MD                 Physician           Readers                                                            Ramsey Vasquez MD  Procedure Type of Study:   Abdominal:Renal, VL RENAL ARTERIAL DUPLEX COMPLETE. Vascular Sonographer Report  Indications for Study:Hypertension. Additional Indications:Patient reports headaches during admission, unclear how long symptoms have lasted prior to admission. Patient reports high blood pressure, though it is not listed in patient's chart; patient is not on blood pressure medication. Renal Artery Duplex Scan: Color and grayscale imaging of the abdominal aorta, bilateral renal arteries and kidneys, including Doppler spectral waveform analysis. Impressions Right Impression Technically difficult and limited study due to bowel and gas persistent throughout entire exam and patient motion (hiccups). There is no evidence to suggest renal artery stenosis. The right renal vein is patent. The parenchymal resistive index is within normal limits. Left Impression There is no evidence to suggest renal artery stenosis. The left renal vein is patent. The parenchymal resistive index is within normal limits. No previous exams for comparison. Conclusions   Summary   Bilateral renal artery ultrasound imaging was performed. No evidence of  renal artery stenosis. Renal veins are patent.    Signature   ------------------------------------------------------------------  Electronically signed by Maricruz Means MD (Interpreting  physician) on 07/23/2021 at 06:35 PM portion of the orbits demonstrate no acute abnormality. SINUSES: The visualized paranasal sinuses and mastoid air cells are well aerated. BONES/SOFT TISSUES: The bone marrow signal intensity appears normal. The soft tissues demonstrate no acute abnormality. 1. No acute intracranial abnormality. No acute infarct. 2. There is global parenchymal volume loss with mild chronic microvascular ischemic changes. 3.  The ventricular dilatation is out of proportion to the cortical sulci. A component of normal pressure hydrocephalus cannot be excluded. EKG NSR      The patient was seen and examined on day of discharge and this discharge summary is in conjunction with any daily progress note from day of discharge. Time Spent on discharge is 30 minutes  in the examination, evaluation, counseling and review of medications and discharge plan. Note that more than 30 minutes was spent in preparing discharge papers, discussing discharge with patient, medication review, etc.       Signed:    Gee Tee MD   7/27/2021      Thank you No primary care provider on file. for the opportunity to be involved in this patient's care.  If you have any questions or concerns please feel free to contact me at [facility]

## 2021-07-27 NOTE — PROGRESS NOTES
Occupational Therapy   Occupational Therapy Initial Assessment  Date: 2021   Patient Name: Mya Ackerman  MRN: [id number]     : 1950    Date of Service: 2021    Discharge Recommendations:  Subacute/Skilled Nursing Facility   Barriers to home discharge:   [x] Reported available assist at home upon discharge limited           Assessment   Performance deficits / Impairments: Decreased safe awareness;Decreased functional mobility ; Decreased balance;Decreased ADL status; Decreased strength;Decreased high-level IADLs;Decreased posture  Assessment: pt reports normally independent with BADL's & functional mobility with RW; now requiring mod assist of 2 for standing balance & bathroom mobility with RW, max assist with LE dressing seated; pt to benefit from skilled OT services  OT Education: OT Role;Plan of Care;Precautions  Patient Education: disease specific: importance of OOB to chair for meals, repositioning/pressure relief & use of Nurse call light for A with ADL's & transfers  Barriers to Learning: safety/cognition  REQUIRES OT FOLLOW UP: Yes  Activity Tolerance  Activity Tolerance: Patient Tolerated treatment well  Activity Tolerance: high veloz's in bed:  BP = 165/86, HR = 75, 97 % O 2 sats on RA; sitting EOB:  BP = 139/63, HR = 76; sitting in chair after bathroom mobility: BP = 134/77, HR = 78  Safety Devices  Safety Devices in place: Yes  Type of devices: Call light within reach; Chair alarm in place; Left in chair;Nurse notified           Patient Diagnosis(es): The primary encounter diagnosis was Syncope and collapse. Diagnoses of Hypertension, unspecified type, Alcohol use, Non-intractable vomiting with nausea, unspecified vomiting type, and Generalized abdominal cramping were also pertinent to this visit. has a past medical history of Cancer ([address] Radiation, and S/P chemotherapy, time since greater than 12 weeks.    has a past surgical history that includes other surgical history; eye surgery (Right, 12/26/13); and Upper gastrointestinal endoscopy (N/A, 7/26/2021).            Restrictions  Restrictions/Precautions  Restrictions/Precautions: General Precautions, Up as Tolerated, Fall Risk  Required Braces or Orthoses?: No    Subjective   General  Chart Reviewed: Yes  Patient assessed for rehabilitation services?: Yes  Additional Pertinent Hx: partial glossectomy  Family / Caregiver Present: No  Referring Practitioner: Dr. Brigitte Frey  Diagnosis: Headache, metabolic encephalopathy, dysphagia  General Comment  Comments: RN cleared pt for OT eval; pt awake in bed, agreeable to therapy  Patient Currently in Pain: Denies  Vital Signs  Patient Currently in Pain: Denies  Social/Functional History  Social/Functional History  Lives With: Family (brother(works))  Type of Home: Trailer  Home Layout: One level  Home Access: Ramped entrance  Entrance Stairs - Rails: Both  Bathroom Shower/Tub: Tub/Shower unit  Bathroom Toilet: Standard  Bathroom Equipment: Tub transfer bench  Bathroom Accessibility: Accessible  Home Equipment: Rolling walker, Wheelchair-manual, Quad cane  Receives Help From: Family (states that sibilings can care)  ADL Assistance: Independent  Homemaking Assistance: Independent  Homemaking Responsibilities: Yes  Ambulation Assistance: Independent (used walker inside, cane outside)  Transfer Assistance: Independent  Active : Yes  Mode of Transportation: Car  Occupation: Retired  Type of occupation: welding  Leisure & Hobbies: TV, now grass riding  Additional Comments: questionable historian about level of [place] PTA       Objective           Observation/Palpation  Posture: Poor (kyphotic posture)  Balance  Sitting Balance: Minimal assistance (dynamic sitting balance due to loss of balance posteriorly when raising arms overhead)  Standing Balance: Dependent/Total (mod assist of 2 due to significant lean posteriorly)  Standing Balance  Time: 2 minutes x 2  Activity: bathroom mobility  Functional Mobility  Functional - Mobility Device: Rolling Walker  Activity: To/from bathroom  Assist Level: Dependent/Total (mod assist of 2)  Toilet Transfers  Toilet - Technique: Ambulating (mod assist of 2 with RW)  Equipment Used: Grab bars  Toilet Transfer: 2 Person assistance (mod/max assist of 2 due to poor LE strength)  ADL  Grooming: Setup (in bed to wash face after episode of emesis)  LE Dressing: Maximum assistance (due to severe posterior lean in static standing & sitting for managing briefs & threading on feet)  Toileting: Stand by assistance (seated due to decreased unsupported sitting balance)    RUE Tone: Normotonic  LUE Tone: Normotonic  Coordination  Movements Are Fluid And Coordinated: Yes     Bed mobility  Supine to Sit: 2 Person assistance (min assist of 2)  Sit to Supine: Unable to assess (Left up in chair upon exiting, pt agreeable)  Transfers  Sit to stand: 2 Person assistance (mod assist of 2 with RW)  Stand to sit: 2 Person assistance (mod assist of 2)  Transfer Comments: severe posterior lean sit-->stand     Vision - Basic Assessment  Prior Vision: Wears glasses only for reading     Cognition  Overall Cognitive Status: Exceptions (pleasant, cooperative at this time)  Following Commands:  Follows one step commands consistently  Attention Span: Attends with cues to redirect  Memory: Decreased recall of precautions  Safety Judgement: Decreased awareness of need for safety;Decreased awareness of need for assistance  Insights: Not aware of deficits  Initiation: Requires cues for some  Sequencing: Requires cues for some        Sensation  Overall Sensation Status: WNL      BUE AROM exercises seated in chair:  Hand flex/ext: x 10   Reps  Elbow flex/ext:  x  10  Reps  Forearm sup/pron:  x 10   Reps  Shld flex/ext:  x  10  Reps      Plan   Plan  Times per week: 3-5x/ week  Current Treatment Recommendations: Strengthening, Balance Training, Functional Mobility Training, Safety Education & Training, Positioning, Endurance Training, Self-Care / ADL           AM-PAC Score        AM-PAC Inpatient Daily Activity Raw Score: 13 (07/27/21 1513)  AM-PAC Inpatient ADL T-Scale Score : 32.03 (07/27/21 1513)  ADL Inpatient CMS 0-100% Score: 63.03 (07/27/21 1513)  ADL Inpatient CMS G-Code Modifier : CL (07/27/21 1513)    Goals  Short term goals  Time Frame for Short term goals: 1 week(8-03-21)  Short term goal 1: mod assist of 1 with functional/toilet transfers by 8-01-21  Short term goal 2: mod assist of 1 with bathroom mobility by 8-03-21  Short term goal 3: SBA sitting Balance EOB  Short term goal 4: tolerate 15 reps BUE strengthening exercises  Patient Goals   Patient goals : be able to get around to go home       Therapy Time   Individual Concurrent Group Co-treatment   Time In 1300         Time Out 1344         Minutes [address] [place], [place]

## 2021-07-27 NOTE — PROGRESS NOTES
[facility]    [facility]              [phone]                        Interval History and plan:      Blood pressure is better  Was am was 721  Potassium 3.4    Plan:  Continue current medication  po liquid potassium ordered  Follow BP with no changes today  He came with high BP and also gets dizzy with lower BP                   Assessment :     Hypertension - emergency  BP: (172)/(91)  Pulse:  [62]   BP goal inpatient [phone] systolic inpatient  Severe  Peak was 205/130 on admission coming down significantly though is still on high side at the time of consult  UA-bland  CT abdomen on 7/21-kidneys unremarkable, unremarkable adrenal glands  Renal function is normal    Hypokalemia  Likely due to nausea and vomiting      [facility] would like to thank Supriya Uriostegui MD   for opportunity to serve this patient      Please call with questions at-   24 Hrs Answering service [phone] or  7 am- 5 pm via Perfect serve or cell phone  Andrea Ross MD          CC/reason for consult :     Hypertension urgency     HPI :     Iza Dacosta is a 70 y.o. male presented to   the hospital on 7/20/2021 with left-sided headache nausea and vomiting. He has dysarthria and unable to provide much details. Per report she also has syncopal episodes several times over the last 7 days. 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 He also has dysphagia because of which he is unable to take p.o. we will continue p.o. medications. He is not known to be on any medication at home. He is currently n.p.o. and getting evaluated for dysphagia. He is noted to have severe hypertension which has been difficult to control. He is also complaining dizziness and headache for which he has had CT scan of the brain done and also MRI. He has chronic microvascular ischemic changes normal pressure hydrocephalus cannot be excluded.     We are consulted for severe hypertension and related issues    ROS:     Seen with-no family    positives in bold   Constitutional:  fever, chills, weakness, weight change, fatigue  Skin:  rash, pruritus, hair loss, bruising, dry skin, petechiae  Head, Face, Neck   headaches, swelling,  cervical adenopathy  Respiratory: shortness of breath, cough, or wheezing  Cardiovascular: chest pain, palpitations, dizzy, edema  Gastrointestinal: nausea, vomiting, diarrhea, constipation,belly pain    Yellow skin, blood in stool  Musculoskeletal:  back pain, muscle weakness, gait problems,       joint pain or swelling. Genitourinary:  dysuria, poor urine flow, flank pain, blood in urine  Neurologic:  vertigo, TIA'S, syncope, seizures, focal weakness  Psychosocial:  insomnia, anxiety, or depression. Additional positive findings:             All other remaining systems are negative or unable to obtain        PMH/PSH/SH/Family History:     Past Medical History:   Diagnosis Date    Cancer ([facility] [address]     jaw    Radiation     S/P chemotherapy, time since greater than 12 weeks        Past Surgical History:   Procedure Laterality Date    EYE SURGERY Right 12/26/13    cataract    OTHER SURGICAL HISTORY      jaw cancer    UPPER GASTROINTESTINAL ENDOSCOPY N/A 7/26/2021    EGD BIOPSY performed by Fuad Hardy MD at [address] [place] [address]        reports that he quit smoking about 8 years ago. He quit after 40.00 years of use. He has never used smokeless tobacco. He reports current alcohol use. He reports that he does not use drugs. family history is not on file.          Medication:     Current Facility-Administered Medications: potassium chloride 10 mEq/100 mL IVPB (Peripheral Line), 10 mEq, Intravenous, Q1H PRN  potassium bicarb-citric acid (EFFER-K) effervescent tablet 40 mEq, 40 mEq, Oral, Once  amLODIPine (NORVASC) tablet 5 mg, 5 mg, Oral, Daily  potassium chloride (KLOR-CON M) extended release tablet 20 mEq, 20 mEq, Oral, PRN **OR** potassium muscle strength- grossly normal , tone - grossly normal  Skin: rashes- no , ulcers- no, induration- no, tightening - no  Psychiatric:  Judgement and insight- normal           AAO X 3  Additional finding:-     LABS:     Recent Labs     07/25/21 0728 07/26/21 0525 07/27/21  0752   WBC 7.9 6.4 7.2   HGB 14.0 14.0 14.6   HCT 40.6 39.9* 42.1    191 210     Recent Labs     07/25/21 0728 07/26/21 0525 07/27/21  0752   * 135* 134*   K 3.5 3.1* 3.4*    99 97*   CO2 25 27 25   BUN 14 12 11   CREATININE 0.6* 0.6* <0.5*   GLUCOSE 100* 98 112*   MG 1.90 1.90 2.00

## 2021-07-28 ENCOUNTER — TELEPHONE (OUTPATIENT)
Dept: GASTROENTEROLOGY | Age: 71
End: 2021-07-28

## 2021-07-28 NOTE — TELEPHONE ENCOUNTER
Spoke with patient regarding results letter. patiet agrees to the prescribed therpay.  Patient would like to schedule repeat EGD  Letter mailed

## 2021-07-31 LAB — RENIN ACTIVITY: 0.1 NG/ML/HR

## 2023-05-25 ENCOUNTER — APPOINTMENT (OUTPATIENT)
Dept: CT IMAGING | Age: 73
DRG: 564 | End: 2023-05-25
Payer: MEDICARE

## 2023-05-25 ENCOUNTER — HOSPITAL ENCOUNTER (INPATIENT)
Age: 73
LOS: 4 days | Discharge: NURSING, REHAB OR LONG-TERM CARE | DRG: 564 | End: 2023-05-30
Attending: EMERGENCY MEDICINE | Admitting: INTERNAL MEDICINE
Payer: MEDICARE

## 2023-05-25 ENCOUNTER — APPOINTMENT (OUTPATIENT)
Dept: GENERAL RADIOLOGY | Age: 73
DRG: 564 | End: 2023-05-25
Payer: MEDICARE

## 2023-05-25 DIAGNOSIS — R53.1 GENERAL WEAKNESS: ICD-10-CM

## 2023-05-25 DIAGNOSIS — T79.6XXA TRAUMATIC RHABDOMYOLYSIS, INITIAL ENCOUNTER (HCC): Primary | ICD-10-CM

## 2023-05-25 DIAGNOSIS — E87.6 ACUTE HYPOKALEMIA: ICD-10-CM

## 2023-05-25 DIAGNOSIS — S22.41XA CLOSED FRACTURE OF MULTIPLE RIBS OF RIGHT SIDE, INITIAL ENCOUNTER: ICD-10-CM

## 2023-05-25 LAB
ALBUMIN SERPL-MCNC: 4.4 G/DL (ref 3.4–5)
ALBUMIN/GLOB SERPL: 1.5 {RATIO} (ref 1.1–2.2)
ALP SERPL-CCNC: 71 U/L (ref 40–129)
ALT SERPL-CCNC: 36 U/L (ref 10–40)
ANION GAP SERPL CALCULATED.3IONS-SCNC: 15 MMOL/L (ref 3–16)
AST SERPL-CCNC: 101 U/L (ref 15–37)
BASOPHILS # BLD: 0.1 K/UL (ref 0–0.2)
BASOPHILS NFR BLD: 0.6 %
BILIRUB SERPL-MCNC: 0.7 MG/DL (ref 0–1)
BUN SERPL-MCNC: 11 MG/DL (ref 7–20)
CALCIUM SERPL-MCNC: 9.6 MG/DL (ref 8.3–10.6)
CHLORIDE SERPL-SCNC: 96 MMOL/L (ref 99–110)
CK SERPL-CCNC: 3669 U/L (ref 39–308)
CO2 SERPL-SCNC: 26 MMOL/L (ref 21–32)
CREAT SERPL-MCNC: 0.7 MG/DL (ref 0.8–1.3)
DEPRECATED RDW RBC AUTO: 14 % (ref 12.4–15.4)
EOSINOPHIL # BLD: 0 K/UL (ref 0–0.6)
EOSINOPHIL NFR BLD: 0 %
GFR SERPLBLD CREATININE-BSD FMLA CKD-EPI: >60 ML/MIN/{1.73_M2}
GLUCOSE SERPL-MCNC: 127 MG/DL (ref 70–99)
HCT VFR BLD AUTO: 46.9 % (ref 40.5–52.5)
HGB BLD-MCNC: 15.9 G/DL (ref 13.5–17.5)
LYMPHOCYTES # BLD: 0.8 K/UL (ref 1–5.1)
LYMPHOCYTES NFR BLD: 6.9 %
MCH RBC QN AUTO: 32.5 PG (ref 26–34)
MCHC RBC AUTO-ENTMCNC: 33.9 G/DL (ref 31–36)
MCV RBC AUTO: 95.8 FL (ref 80–100)
MONOCYTES # BLD: 1 K/UL (ref 0–1.3)
MONOCYTES NFR BLD: 9 %
NEUTROPHILS # BLD: 9.5 K/UL (ref 1.7–7.7)
NEUTROPHILS NFR BLD: 83.5 %
PLATELET # BLD AUTO: 221 K/UL (ref 135–450)
PMV BLD AUTO: 7 FL (ref 5–10.5)
POTASSIUM SERPL-SCNC: 4 MMOL/L (ref 3.5–5.1)
PROT SERPL-MCNC: 7.4 G/DL (ref 6.4–8.2)
RBC # BLD AUTO: 4.9 M/UL (ref 4.2–5.9)
SODIUM SERPL-SCNC: 137 MMOL/L (ref 136–145)
TROPONIN, HIGH SENSITIVITY: 37 NG/L (ref 0–22)
WBC # BLD AUTO: 11.4 K/UL (ref 4–11)

## 2023-05-25 PROCEDURE — 70450 CT HEAD/BRAIN W/O DYE: CPT

## 2023-05-25 PROCEDURE — 93005 ELECTROCARDIOGRAM TRACING: CPT | Performed by: EMERGENCY MEDICINE

## 2023-05-25 PROCEDURE — 71101 X-RAY EXAM UNILAT RIBS/CHEST: CPT

## 2023-05-25 PROCEDURE — 84484 ASSAY OF TROPONIN QUANT: CPT

## 2023-05-25 PROCEDURE — 2580000003 HC RX 258: Performed by: NURSE PRACTITIONER

## 2023-05-25 PROCEDURE — 80053 COMPREHEN METABOLIC PANEL: CPT

## 2023-05-25 PROCEDURE — 6370000000 HC RX 637 (ALT 250 FOR IP): Performed by: EMERGENCY MEDICINE

## 2023-05-25 PROCEDURE — 99285 EMERGENCY DEPT VISIT HI MDM: CPT

## 2023-05-25 PROCEDURE — 82550 ASSAY OF CK (CPK): CPT

## 2023-05-25 PROCEDURE — 85025 COMPLETE CBC W/AUTO DIFF WBC: CPT

## 2023-05-25 PROCEDURE — 72125 CT NECK SPINE W/O DYE: CPT

## 2023-05-25 RX ORDER — 0.9 % SODIUM CHLORIDE 0.9 %
1000 INTRAVENOUS SOLUTION INTRAVENOUS ONCE
Status: COMPLETED | OUTPATIENT
Start: 2023-05-25 | End: 2023-05-26

## 2023-05-25 RX ORDER — LIDOCAINE 4 G/G
1 PATCH TOPICAL ONCE
Status: COMPLETED | OUTPATIENT
Start: 2023-05-25 | End: 2023-05-26

## 2023-05-25 RX ADMIN — SODIUM CHLORIDE 1000 ML: 9 INJECTION, SOLUTION INTRAVENOUS at 22:36

## 2023-05-25 ASSESSMENT — LIFESTYLE VARIABLES
HOW OFTEN DO YOU HAVE A DRINK CONTAINING ALCOHOL: 2-3 TIMES A WEEK
HOW MANY STANDARD DRINKS CONTAINING ALCOHOL DO YOU HAVE ON A TYPICAL DAY: 3 OR 4

## 2023-05-25 ASSESSMENT — PAIN - FUNCTIONAL ASSESSMENT: PAIN_FUNCTIONAL_ASSESSMENT: 0-10

## 2023-05-25 ASSESSMENT — PAIN SCALES - GENERAL: PAINLEVEL_OUTOF10: 0

## 2023-05-26 PROBLEM — M62.82 RHABDOMYOLYSIS: Status: ACTIVE | Noted: 2023-05-26

## 2023-05-26 LAB
ALBUMIN SERPL-MCNC: 3.8 G/DL (ref 3.4–5)
ALBUMIN/GLOB SERPL: 1.5 {RATIO} (ref 1.1–2.2)
ALP SERPL-CCNC: 59 U/L (ref 40–129)
ALT SERPL-CCNC: 30 U/L (ref 10–40)
ANION GAP SERPL CALCULATED.3IONS-SCNC: 11 MMOL/L (ref 3–16)
AST SERPL-CCNC: 75 U/L (ref 15–37)
BASOPHILS # BLD: 0 K/UL (ref 0–0.2)
BASOPHILS NFR BLD: 0.3 %
BILIRUB SERPL-MCNC: 0.6 MG/DL (ref 0–1)
BILIRUB UR QL STRIP.AUTO: ABNORMAL
BUN SERPL-MCNC: 12 MG/DL (ref 7–20)
CALCIUM SERPL-MCNC: 8.9 MG/DL (ref 8.3–10.6)
CHLORIDE SERPL-SCNC: 102 MMOL/L (ref 99–110)
CLARITY UR: CLEAR
CO2 SERPL-SCNC: 26 MMOL/L (ref 21–32)
COLOR UR: YELLOW
CREAT SERPL-MCNC: 0.6 MG/DL (ref 0.8–1.3)
DEPRECATED RDW RBC AUTO: 13.8 % (ref 12.4–15.4)
EOSINOPHIL # BLD: 0 K/UL (ref 0–0.6)
EOSINOPHIL NFR BLD: 0.5 %
EPI CELLS #/AREA URNS HPF: ABNORMAL /HPF (ref 0–5)
GFR SERPLBLD CREATININE-BSD FMLA CKD-EPI: >60 ML/MIN/{1.73_M2}
GLUCOSE SERPL-MCNC: 95 MG/DL (ref 70–99)
GLUCOSE UR STRIP.AUTO-MCNC: NEGATIVE MG/DL
HCT VFR BLD AUTO: 44.6 % (ref 40.5–52.5)
HGB BLD-MCNC: 15.3 G/DL (ref 13.5–17.5)
HGB UR QL STRIP.AUTO: NEGATIVE
KETONES UR STRIP.AUTO-MCNC: 40 MG/DL
LEUKOCYTE ESTERASE UR QL STRIP.AUTO: NEGATIVE
LYMPHOCYTES # BLD: 1.1 K/UL (ref 1–5.1)
LYMPHOCYTES NFR BLD: 11.7 %
MAGNESIUM SERPL-MCNC: 2.3 MG/DL (ref 1.8–2.4)
MCH RBC QN AUTO: 33.1 PG (ref 26–34)
MCHC RBC AUTO-ENTMCNC: 34.4 G/DL (ref 31–36)
MCV RBC AUTO: 96.4 FL (ref 80–100)
MONOCYTES # BLD: 1.1 K/UL (ref 0–1.3)
MONOCYTES NFR BLD: 11.8 %
MUCOUS THREADS #/AREA URNS LPF: ABNORMAL /LPF
NEUTROPHILS # BLD: 6.9 K/UL (ref 1.7–7.7)
NEUTROPHILS NFR BLD: 75.7 %
NITRITE UR QL STRIP.AUTO: NEGATIVE
PH UR STRIP.AUTO: 6 [PH] (ref 5–8)
PLATELET # BLD AUTO: 209 K/UL (ref 135–450)
PMV BLD AUTO: 7.1 FL (ref 5–10.5)
POTASSIUM SERPL-SCNC: 3.4 MMOL/L (ref 3.5–5.1)
PROT SERPL-MCNC: 6.4 G/DL (ref 6.4–8.2)
PROT UR STRIP.AUTO-MCNC: 30 MG/DL
RBC # BLD AUTO: 4.63 M/UL (ref 4.2–5.9)
RBC #/AREA URNS HPF: ABNORMAL /HPF (ref 0–4)
SODIUM SERPL-SCNC: 139 MMOL/L (ref 136–145)
SP GR UR STRIP.AUTO: >=1.03 (ref 1–1.03)
UA COMPLETE W REFLEX CULTURE PNL UR: ABNORMAL
UA DIPSTICK W REFLEX MICRO PNL UR: YES
URN SPEC COLLECT METH UR: ABNORMAL
UROBILINOGEN UR STRIP-ACNC: 0.2 E.U./DL
WBC # BLD AUTO: 9.2 K/UL (ref 4–11)
WBC #/AREA URNS HPF: ABNORMAL /HPF (ref 0–5)

## 2023-05-26 PROCEDURE — 97166 OT EVAL MOD COMPLEX 45 MIN: CPT

## 2023-05-26 PROCEDURE — 97162 PT EVAL MOD COMPLEX 30 MIN: CPT

## 2023-05-26 PROCEDURE — 97530 THERAPEUTIC ACTIVITIES: CPT

## 2023-05-26 PROCEDURE — 97110 THERAPEUTIC EXERCISES: CPT

## 2023-05-26 PROCEDURE — 6360000002 HC RX W HCPCS: Performed by: INTERNAL MEDICINE

## 2023-05-26 PROCEDURE — 2580000003 HC RX 258: Performed by: INTERNAL MEDICINE

## 2023-05-26 PROCEDURE — 6370000000 HC RX 637 (ALT 250 FOR IP): Performed by: INTERNAL MEDICINE

## 2023-05-26 PROCEDURE — 81001 URINALYSIS AUTO W/SCOPE: CPT

## 2023-05-26 PROCEDURE — 85025 COMPLETE CBC W/AUTO DIFF WBC: CPT

## 2023-05-26 PROCEDURE — 1200000000 HC SEMI PRIVATE

## 2023-05-26 PROCEDURE — 83735 ASSAY OF MAGNESIUM: CPT

## 2023-05-26 PROCEDURE — 36415 COLL VENOUS BLD VENIPUNCTURE: CPT

## 2023-05-26 PROCEDURE — 80053 COMPREHEN METABOLIC PANEL: CPT

## 2023-05-26 RX ORDER — OXYCODONE AND ACETAMINOPHEN 10; 325 MG/1; MG/1
1 TABLET ORAL EVERY 6 HOURS PRN
Status: DISCONTINUED | OUTPATIENT
Start: 2023-05-26 | End: 2023-05-27

## 2023-05-26 RX ORDER — ACETAMINOPHEN 650 MG/1
650 SUPPOSITORY RECTAL EVERY 6 HOURS PRN
Status: DISCONTINUED | OUTPATIENT
Start: 2023-05-26 | End: 2023-05-30 | Stop reason: HOSPADM

## 2023-05-26 RX ORDER — POLYETHYLENE GLYCOL 3350 17 G/17G
17 POWDER, FOR SOLUTION ORAL DAILY
Status: DISCONTINUED | OUTPATIENT
Start: 2023-05-26 | End: 2023-05-30 | Stop reason: HOSPADM

## 2023-05-26 RX ORDER — ACETAMINOPHEN 325 MG/1
650 TABLET ORAL EVERY 6 HOURS PRN
Status: DISCONTINUED | OUTPATIENT
Start: 2023-05-26 | End: 2023-05-30 | Stop reason: HOSPADM

## 2023-05-26 RX ORDER — MAGNESIUM SULFATE 1 G/100ML
1000 INJECTION INTRAVENOUS PRN
Status: DISCONTINUED | OUTPATIENT
Start: 2023-05-26 | End: 2023-05-30 | Stop reason: HOSPADM

## 2023-05-26 RX ORDER — ENOXAPARIN SODIUM 100 MG/ML
40 INJECTION SUBCUTANEOUS DAILY
Status: DISCONTINUED | OUTPATIENT
Start: 2023-05-26 | End: 2023-05-30 | Stop reason: HOSPADM

## 2023-05-26 RX ORDER — SODIUM CHLORIDE 9 MG/ML
INJECTION, SOLUTION INTRAVENOUS PRN
Status: DISCONTINUED | OUTPATIENT
Start: 2023-05-26 | End: 2023-05-30 | Stop reason: HOSPADM

## 2023-05-26 RX ORDER — ONDANSETRON 4 MG/1
4 TABLET, ORALLY DISINTEGRATING ORAL EVERY 8 HOURS PRN
Status: DISCONTINUED | OUTPATIENT
Start: 2023-05-26 | End: 2023-05-30 | Stop reason: HOSPADM

## 2023-05-26 RX ORDER — CALCIUM CARBONATE 500 MG/1
1000 TABLET, CHEWABLE ORAL 3 TIMES DAILY PRN
Status: DISCONTINUED | OUTPATIENT
Start: 2023-05-26 | End: 2023-05-30 | Stop reason: HOSPADM

## 2023-05-26 RX ORDER — SODIUM CHLORIDE 0.9 % (FLUSH) 0.9 %
10 SYRINGE (ML) INJECTION PRN
Status: DISCONTINUED | OUTPATIENT
Start: 2023-05-26 | End: 2023-05-30 | Stop reason: HOSPADM

## 2023-05-26 RX ORDER — POTASSIUM CHLORIDE 20 MEQ/1
40 TABLET, EXTENDED RELEASE ORAL PRN
Status: DISCONTINUED | OUTPATIENT
Start: 2023-05-26 | End: 2023-05-30 | Stop reason: HOSPADM

## 2023-05-26 RX ORDER — ONDANSETRON 2 MG/ML
4 INJECTION INTRAMUSCULAR; INTRAVENOUS EVERY 6 HOURS PRN
Status: DISCONTINUED | OUTPATIENT
Start: 2023-05-26 | End: 2023-05-30 | Stop reason: HOSPADM

## 2023-05-26 RX ORDER — LANOLIN ALCOHOL/MO/W.PET/CERES
3 CREAM (GRAM) TOPICAL NIGHTLY PRN
Status: DISCONTINUED | OUTPATIENT
Start: 2023-05-26 | End: 2023-05-30 | Stop reason: HOSPADM

## 2023-05-26 RX ORDER — POTASSIUM CHLORIDE 7.45 MG/ML
10 INJECTION INTRAVENOUS PRN
Status: DISCONTINUED | OUTPATIENT
Start: 2023-05-26 | End: 2023-05-30 | Stop reason: HOSPADM

## 2023-05-26 RX ORDER — SODIUM CHLORIDE, SODIUM LACTATE, POTASSIUM CHLORIDE, CALCIUM CHLORIDE 600; 310; 30; 20 MG/100ML; MG/100ML; MG/100ML; MG/100ML
2000 INJECTION, SOLUTION INTRAVENOUS ONCE
Status: COMPLETED | OUTPATIENT
Start: 2023-05-26 | End: 2023-05-26

## 2023-05-26 RX ADMIN — SODIUM CHLORIDE, POTASSIUM CHLORIDE, SODIUM LACTATE AND CALCIUM CHLORIDE 2000 ML: 600; 310; 30; 20 INJECTION, SOLUTION INTRAVENOUS at 04:52

## 2023-05-26 RX ADMIN — POTASSIUM BICARBONATE 40 MEQ: 782 TABLET, EFFERVESCENT ORAL at 09:46

## 2023-05-26 RX ADMIN — ENOXAPARIN SODIUM 40 MG: 100 INJECTION SUBCUTANEOUS at 09:46

## 2023-05-26 RX ADMIN — OXYCODONE AND ACETAMINOPHEN 1 TABLET: 10; 325 TABLET ORAL at 15:49

## 2023-05-26 RX ADMIN — OXYCODONE AND ACETAMINOPHEN 1 TABLET: 10; 325 TABLET ORAL at 22:02

## 2023-05-26 RX ADMIN — POLYETHYLENE GLYCOL 3350 17 G: 17 POWDER, FOR SOLUTION ORAL at 09:46

## 2023-05-26 RX ADMIN — OXYCODONE AND ACETAMINOPHEN 1 TABLET: 10; 325 TABLET ORAL at 09:54

## 2023-05-26 ASSESSMENT — PAIN SCALES - GENERAL
PAINLEVEL_OUTOF10: 8
PAINLEVEL_OUTOF10: 8
PAINLEVEL_OUTOF10: 4
PAINLEVEL_OUTOF10: 7

## 2023-05-26 ASSESSMENT — PAIN DESCRIPTION - DESCRIPTORS
DESCRIPTORS: DISCOMFORT;ACHING
DESCRIPTORS: DISCOMFORT
DESCRIPTORS: ACHING

## 2023-05-26 ASSESSMENT — PAIN DESCRIPTION - LOCATION
LOCATION: GENERALIZED

## 2023-05-26 ASSESSMENT — PAIN DESCRIPTION - PAIN TYPE: TYPE: ACUTE PAIN

## 2023-05-26 ASSESSMENT — PAIN - FUNCTIONAL ASSESSMENT
PAIN_FUNCTIONAL_ASSESSMENT: PREVENTS OR INTERFERES SOME ACTIVE ACTIVITIES AND ADLS
PAIN_FUNCTIONAL_ASSESSMENT: ACTIVITIES ARE NOT PREVENTED

## 2023-05-26 ASSESSMENT — PAIN DESCRIPTION - ORIENTATION: ORIENTATION: OTHER (COMMENT)

## 2023-05-27 ENCOUNTER — APPOINTMENT (OUTPATIENT)
Dept: GENERAL RADIOLOGY | Age: 73
DRG: 564 | End: 2023-05-27
Payer: MEDICARE

## 2023-05-27 PROBLEM — S22.41XA CLOSED FRACTURE OF MULTIPLE RIBS OF RIGHT SIDE: Status: ACTIVE | Noted: 2023-05-27

## 2023-05-27 LAB
ALBUMIN SERPL-MCNC: 3.9 G/DL (ref 3.4–5)
ALBUMIN/GLOB SERPL: 1.4 {RATIO} (ref 1.1–2.2)
ALP SERPL-CCNC: 75 U/L (ref 40–129)
ALT SERPL-CCNC: 32 U/L (ref 10–40)
ANION GAP SERPL CALCULATED.3IONS-SCNC: 11 MMOL/L (ref 3–16)
AST SERPL-CCNC: 56 U/L (ref 15–37)
BASOPHILS # BLD: 0.1 K/UL (ref 0–0.2)
BASOPHILS NFR BLD: 0.7 %
BILIRUB SERPL-MCNC: 0.9 MG/DL (ref 0–1)
BUN SERPL-MCNC: 10 MG/DL (ref 7–20)
CALCIUM SERPL-MCNC: 9 MG/DL (ref 8.3–10.6)
CHLORIDE SERPL-SCNC: 96 MMOL/L (ref 99–110)
CK SERPL-CCNC: 928 U/L (ref 39–308)
CO2 SERPL-SCNC: 29 MMOL/L (ref 21–32)
CREAT SERPL-MCNC: 0.6 MG/DL (ref 0.8–1.3)
DEPRECATED RDW RBC AUTO: 13.9 % (ref 12.4–15.4)
EKG ATRIAL RATE: 84 BPM
EKG DIAGNOSIS: NORMAL
EKG P AXIS: 12 DEGREES
EKG P-R INTERVAL: 140 MS
EKG Q-T INTERVAL: 386 MS
EKG QRS DURATION: 92 MS
EKG QTC CALCULATION (BAZETT): 456 MS
EKG R AXIS: -48 DEGREES
EKG T AXIS: 59 DEGREES
EKG VENTRICULAR RATE: 84 BPM
EOSINOPHIL # BLD: 0.1 K/UL (ref 0–0.6)
EOSINOPHIL NFR BLD: 0.7 %
GFR SERPLBLD CREATININE-BSD FMLA CKD-EPI: >60 ML/MIN/{1.73_M2}
GLUCOSE SERPL-MCNC: 103 MG/DL (ref 70–99)
HCT VFR BLD AUTO: 45.6 % (ref 40.5–52.5)
HGB BLD-MCNC: 15.6 G/DL (ref 13.5–17.5)
LYMPHOCYTES # BLD: 0.9 K/UL (ref 1–5.1)
LYMPHOCYTES NFR BLD: 10.1 %
MAGNESIUM SERPL-MCNC: 2.2 MG/DL (ref 1.8–2.4)
MCH RBC QN AUTO: 32.9 PG (ref 26–34)
MCHC RBC AUTO-ENTMCNC: 34.2 G/DL (ref 31–36)
MCV RBC AUTO: 96.3 FL (ref 80–100)
MONOCYTES # BLD: 0.6 K/UL (ref 0–1.3)
MONOCYTES NFR BLD: 6.8 %
NEUTROPHILS # BLD: 7.6 K/UL (ref 1.7–7.7)
NEUTROPHILS NFR BLD: 81.7 %
PLATELET # BLD AUTO: 222 K/UL (ref 135–450)
PMV BLD AUTO: 7.2 FL (ref 5–10.5)
POTASSIUM SERPL-SCNC: 3.3 MMOL/L (ref 3.5–5.1)
PROT SERPL-MCNC: 6.7 G/DL (ref 6.4–8.2)
RBC # BLD AUTO: 4.73 M/UL (ref 4.2–5.9)
SODIUM SERPL-SCNC: 136 MMOL/L (ref 136–145)
WBC # BLD AUTO: 9.3 K/UL (ref 4–11)

## 2023-05-27 PROCEDURE — 71045 X-RAY EXAM CHEST 1 VIEW: CPT

## 2023-05-27 PROCEDURE — 83735 ASSAY OF MAGNESIUM: CPT

## 2023-05-27 PROCEDURE — 36415 COLL VENOUS BLD VENIPUNCTURE: CPT

## 2023-05-27 PROCEDURE — 2580000003 HC RX 258: Performed by: INTERNAL MEDICINE

## 2023-05-27 PROCEDURE — 93010 ELECTROCARDIOGRAM REPORT: CPT | Performed by: INTERNAL MEDICINE

## 2023-05-27 PROCEDURE — 87205 SMEAR GRAM STAIN: CPT

## 2023-05-27 PROCEDURE — 6370000000 HC RX 637 (ALT 250 FOR IP): Performed by: INTERNAL MEDICINE

## 2023-05-27 PROCEDURE — 82550 ASSAY OF CK (CPK): CPT

## 2023-05-27 PROCEDURE — 80053 COMPREHEN METABOLIC PANEL: CPT

## 2023-05-27 PROCEDURE — 6360000002 HC RX W HCPCS: Performed by: INTERNAL MEDICINE

## 2023-05-27 PROCEDURE — 1200000000 HC SEMI PRIVATE

## 2023-05-27 PROCEDURE — 85025 COMPLETE CBC W/AUTO DIFF WBC: CPT

## 2023-05-27 PROCEDURE — 87070 CULTURE OTHR SPECIMN AEROBIC: CPT

## 2023-05-27 RX ORDER — AMLODIPINE BESYLATE 2.5 MG/1
2.5 TABLET ORAL DAILY
Status: DISCONTINUED | OUTPATIENT
Start: 2023-05-27 | End: 2023-05-30 | Stop reason: HOSPADM

## 2023-05-27 RX ORDER — LIDOCAINE 4 G/G
1 PATCH TOPICAL DAILY
Status: DISCONTINUED | OUTPATIENT
Start: 2023-05-27 | End: 2023-05-30 | Stop reason: HOSPADM

## 2023-05-27 RX ORDER — OXYCODONE AND ACETAMINOPHEN 10; 325 MG/1; MG/1
1 TABLET ORAL EVERY 4 HOURS PRN
Status: DISCONTINUED | OUTPATIENT
Start: 2023-05-27 | End: 2023-05-27

## 2023-05-27 RX ORDER — SODIUM CHLORIDE 9 MG/ML
INJECTION, SOLUTION INTRAVENOUS CONTINUOUS
Status: ACTIVE | OUTPATIENT
Start: 2023-05-27 | End: 2023-05-27

## 2023-05-27 RX ORDER — SENNA AND DOCUSATE SODIUM 50; 8.6 MG/1; MG/1
1 TABLET, FILM COATED ORAL 2 TIMES DAILY
Status: DISCONTINUED | OUTPATIENT
Start: 2023-05-27 | End: 2023-05-30 | Stop reason: HOSPADM

## 2023-05-27 RX ORDER — OXYCODONE HYDROCHLORIDE 5 MG/1
5 TABLET ORAL EVERY 4 HOURS PRN
Status: DISCONTINUED | OUTPATIENT
Start: 2023-05-27 | End: 2023-05-30 | Stop reason: HOSPADM

## 2023-05-27 RX ORDER — OXYCODONE HYDROCHLORIDE 5 MG/1
10 TABLET ORAL EVERY 4 HOURS PRN
Status: DISCONTINUED | OUTPATIENT
Start: 2023-05-27 | End: 2023-05-30 | Stop reason: HOSPADM

## 2023-05-27 RX ORDER — AMLODIPINE BESYLATE 5 MG/1
5 TABLET ORAL DAILY
Status: DISCONTINUED | OUTPATIENT
Start: 2023-05-27 | End: 2023-05-27

## 2023-05-27 RX ADMIN — SENNOSIDES AND DOCUSATE SODIUM 1 TABLET: 50; 8.6 TABLET ORAL at 10:56

## 2023-05-27 RX ADMIN — OXYCODONE HYDROCHLORIDE 5 MG: 5 TABLET ORAL at 22:36

## 2023-05-27 RX ADMIN — SENNOSIDES AND DOCUSATE SODIUM 1 TABLET: 50; 8.6 TABLET ORAL at 22:32

## 2023-05-27 RX ADMIN — AMLODIPINE BESYLATE 2.5 MG: 2.5 TABLET ORAL at 10:56

## 2023-05-27 RX ADMIN — OXYCODONE HYDROCHLORIDE 10 MG: 5 TABLET ORAL at 10:56

## 2023-05-27 RX ADMIN — SODIUM CHLORIDE: 9 INJECTION, SOLUTION INTRAVENOUS at 10:54

## 2023-05-27 RX ADMIN — OXYCODONE AND ACETAMINOPHEN 1 TABLET: 10; 325 TABLET ORAL at 04:09

## 2023-05-27 RX ADMIN — POLYETHYLENE GLYCOL 3350 17 G: 17 POWDER, FOR SOLUTION ORAL at 08:23

## 2023-05-27 RX ADMIN — ENOXAPARIN SODIUM 40 MG: 100 INJECTION SUBCUTANEOUS at 08:23

## 2023-05-27 RX ADMIN — OXYCODONE HYDROCHLORIDE 10 MG: 5 TABLET ORAL at 17:44

## 2023-05-27 ASSESSMENT — PAIN SCALES - GENERAL
PAINLEVEL_OUTOF10: 0
PAINLEVEL_OUTOF10: 8
PAINLEVEL_OUTOF10: 7
PAINLEVEL_OUTOF10: 0
PAINLEVEL_OUTOF10: 3
PAINLEVEL_OUTOF10: 8
PAINLEVEL_OUTOF10: 7
PAINLEVEL_OUTOF10: 7

## 2023-05-27 ASSESSMENT — PAIN SCALES - WONG BAKER
WONGBAKER_NUMERICALRESPONSE: 2
WONGBAKER_NUMERICALRESPONSE: 0
WONGBAKER_NUMERICALRESPONSE: 0
WONGBAKER_NUMERICALRESPONSE: 2

## 2023-05-27 ASSESSMENT — PAIN DESCRIPTION - LOCATION
LOCATION: RIB CAGE
LOCATION: FLANK
LOCATION: RIB CAGE

## 2023-05-27 ASSESSMENT — PAIN DESCRIPTION - ORIENTATION
ORIENTATION: RIGHT

## 2023-05-27 ASSESSMENT — PAIN - FUNCTIONAL ASSESSMENT
PAIN_FUNCTIONAL_ASSESSMENT: PREVENTS OR INTERFERES SOME ACTIVE ACTIVITIES AND ADLS
PAIN_FUNCTIONAL_ASSESSMENT: PREVENTS OR INTERFERES WITH MANY ACTIVE NOT PASSIVE ACTIVITIES

## 2023-05-27 ASSESSMENT — PAIN DESCRIPTION - DESCRIPTORS
DESCRIPTORS: ACHING
DESCRIPTORS: ACHING;DISCOMFORT

## 2023-05-28 LAB
ANION GAP SERPL CALCULATED.3IONS-SCNC: 11 MMOL/L (ref 3–16)
BASOPHILS # BLD: 0 K/UL (ref 0–0.2)
BASOPHILS NFR BLD: 0.5 %
BUN SERPL-MCNC: 11 MG/DL (ref 7–20)
CALCIUM SERPL-MCNC: 8.7 MG/DL (ref 8.3–10.6)
CHLORIDE SERPL-SCNC: 98 MMOL/L (ref 99–110)
CK SERPL-CCNC: 431 U/L (ref 39–308)
CO2 SERPL-SCNC: 24 MMOL/L (ref 21–32)
CREAT SERPL-MCNC: 0.6 MG/DL (ref 0.8–1.3)
DEPRECATED RDW RBC AUTO: 13.6 % (ref 12.4–15.4)
EOSINOPHIL # BLD: 0 K/UL (ref 0–0.6)
EOSINOPHIL NFR BLD: 0.3 %
GFR SERPLBLD CREATININE-BSD FMLA CKD-EPI: >60 ML/MIN/{1.73_M2}
GLUCOSE SERPL-MCNC: 101 MG/DL (ref 70–99)
HCT VFR BLD AUTO: 43.8 % (ref 40.5–52.5)
HGB BLD-MCNC: 15.3 G/DL (ref 13.5–17.5)
LYMPHOCYTES # BLD: 1 K/UL (ref 1–5.1)
LYMPHOCYTES NFR BLD: 9.3 %
MAGNESIUM SERPL-MCNC: 2.1 MG/DL (ref 1.8–2.4)
MCH RBC QN AUTO: 33.3 PG (ref 26–34)
MCHC RBC AUTO-ENTMCNC: 34.8 G/DL (ref 31–36)
MCV RBC AUTO: 95.7 FL (ref 80–100)
MONOCYTES # BLD: 0.8 K/UL (ref 0–1.3)
MONOCYTES NFR BLD: 7.6 %
NEUTROPHILS # BLD: 8.6 K/UL (ref 1.7–7.7)
NEUTROPHILS NFR BLD: 82.3 %
PLATELET # BLD AUTO: 201 K/UL (ref 135–450)
PMV BLD AUTO: 6.6 FL (ref 5–10.5)
POTASSIUM SERPL-SCNC: 3.4 MMOL/L (ref 3.5–5.1)
RBC # BLD AUTO: 4.58 M/UL (ref 4.2–5.9)
SODIUM SERPL-SCNC: 133 MMOL/L (ref 136–145)
WBC # BLD AUTO: 10.4 K/UL (ref 4–11)

## 2023-05-28 PROCEDURE — 83735 ASSAY OF MAGNESIUM: CPT

## 2023-05-28 PROCEDURE — 6370000000 HC RX 637 (ALT 250 FOR IP): Performed by: INTERNAL MEDICINE

## 2023-05-28 PROCEDURE — 6360000002 HC RX W HCPCS: Performed by: INTERNAL MEDICINE

## 2023-05-28 PROCEDURE — 2580000003 HC RX 258: Performed by: INTERNAL MEDICINE

## 2023-05-28 PROCEDURE — 82550 ASSAY OF CK (CPK): CPT

## 2023-05-28 PROCEDURE — 85025 COMPLETE CBC W/AUTO DIFF WBC: CPT

## 2023-05-28 PROCEDURE — 36415 COLL VENOUS BLD VENIPUNCTURE: CPT

## 2023-05-28 PROCEDURE — 80048 BASIC METABOLIC PNL TOTAL CA: CPT

## 2023-05-28 PROCEDURE — 1200000000 HC SEMI PRIVATE

## 2023-05-28 RX ORDER — LACTOBACILLUS RHAMNOSUS GG 10B CELL
1 CAPSULE ORAL 2 TIMES DAILY WITH MEALS
Status: DISCONTINUED | OUTPATIENT
Start: 2023-05-28 | End: 2023-05-30 | Stop reason: HOSPADM

## 2023-05-28 RX ORDER — METOPROLOL TARTRATE 50 MG/1
50 TABLET, FILM COATED ORAL 2 TIMES DAILY
Status: DISCONTINUED | OUTPATIENT
Start: 2023-05-28 | End: 2023-05-30 | Stop reason: HOSPADM

## 2023-05-28 RX ADMIN — AZITHROMYCIN MONOHYDRATE 500 MG: 500 INJECTION, POWDER, LYOPHILIZED, FOR SOLUTION INTRAVENOUS at 13:54

## 2023-05-28 RX ADMIN — Medication 1 CAPSULE: at 17:53

## 2023-05-28 RX ADMIN — Medication 1 CAPSULE: at 13:48

## 2023-05-28 RX ADMIN — SENNOSIDES AND DOCUSATE SODIUM 1 TABLET: 50; 8.6 TABLET ORAL at 20:09

## 2023-05-28 RX ADMIN — METOPROLOL TARTRATE 50 MG: 50 TABLET, FILM COATED ORAL at 20:08

## 2023-05-28 RX ADMIN — OXYCODONE HYDROCHLORIDE 10 MG: 5 TABLET ORAL at 09:34

## 2023-05-28 RX ADMIN — SENNOSIDES AND DOCUSATE SODIUM 1 TABLET: 50; 8.6 TABLET ORAL at 09:34

## 2023-05-28 RX ADMIN — AMLODIPINE BESYLATE 2.5 MG: 2.5 TABLET ORAL at 09:34

## 2023-05-28 RX ADMIN — CEFTRIAXONE SODIUM 1000 MG: 1 INJECTION, POWDER, FOR SOLUTION INTRAMUSCULAR; INTRAVENOUS at 15:09

## 2023-05-28 RX ADMIN — POTASSIUM CHLORIDE 40 MEQ: 1500 TABLET, EXTENDED RELEASE ORAL at 09:35

## 2023-05-28 RX ADMIN — ENOXAPARIN SODIUM 40 MG: 100 INJECTION SUBCUTANEOUS at 09:35

## 2023-05-28 RX ADMIN — METOPROLOL TARTRATE 25 MG: 25 TABLET, FILM COATED ORAL at 13:48

## 2023-05-28 RX ADMIN — OXYCODONE HYDROCHLORIDE 10 MG: 5 TABLET ORAL at 20:08

## 2023-05-28 RX ADMIN — OXYCODONE HYDROCHLORIDE 5 MG: 5 TABLET ORAL at 03:47

## 2023-05-28 RX ADMIN — OXYCODONE HYDROCHLORIDE 10 MG: 5 TABLET ORAL at 15:02

## 2023-05-28 RX ADMIN — POLYETHYLENE GLYCOL 3350 17 G: 17 POWDER, FOR SOLUTION ORAL at 09:35

## 2023-05-28 ASSESSMENT — PAIN DESCRIPTION - INTENSITY: RATING_2: 7

## 2023-05-28 ASSESSMENT — PAIN SCALES - GENERAL
PAINLEVEL_OUTOF10: 7
PAINLEVEL_OUTOF10: 8
PAINLEVEL_OUTOF10: 8
PAINLEVEL_OUTOF10: 9

## 2023-05-28 ASSESSMENT — PAIN DESCRIPTION - ORIENTATION
ORIENTATION: RIGHT
ORIENTATION_2: LEFT
ORIENTATION: RIGHT

## 2023-05-28 ASSESSMENT — PAIN SCALES - WONG BAKER
WONGBAKER_NUMERICALRESPONSE: 0
WONGBAKER_NUMERICALRESPONSE: 0
WONGBAKER_NUMERICALRESPONSE: 2
WONGBAKER_NUMERICALRESPONSE: 0
WONGBAKER_NUMERICALRESPONSE: 0

## 2023-05-28 ASSESSMENT — PAIN DESCRIPTION - DESCRIPTORS: DESCRIPTORS: DISCOMFORT

## 2023-05-28 ASSESSMENT — PAIN DESCRIPTION - LOCATION
LOCATION_2: ARM
LOCATION: RIB CAGE

## 2023-05-29 LAB
ANION GAP SERPL CALCULATED.3IONS-SCNC: 9 MMOL/L (ref 3–16)
BACTERIA SPEC RESP CULT: NORMAL
BUN SERPL-MCNC: 21 MG/DL (ref 7–20)
CALCIUM SERPL-MCNC: 8.7 MG/DL (ref 8.3–10.6)
CHLORIDE SERPL-SCNC: 99 MMOL/L (ref 99–110)
CK SERPL-CCNC: 195 U/L (ref 39–308)
CO2 SERPL-SCNC: 26 MMOL/L (ref 21–32)
CREAT SERPL-MCNC: 0.7 MG/DL (ref 0.8–1.3)
GFR SERPLBLD CREATININE-BSD FMLA CKD-EPI: >60 ML/MIN/{1.73_M2}
GLUCOSE SERPL-MCNC: 111 MG/DL (ref 70–99)
GRAM STN SPEC: NORMAL
POTASSIUM SERPL-SCNC: 3.6 MMOL/L (ref 3.5–5.1)
SODIUM SERPL-SCNC: 134 MMOL/L (ref 136–145)

## 2023-05-29 PROCEDURE — 6360000002 HC RX W HCPCS: Performed by: INTERNAL MEDICINE

## 2023-05-29 PROCEDURE — 80048 BASIC METABOLIC PNL TOTAL CA: CPT

## 2023-05-29 PROCEDURE — 2580000003 HC RX 258: Performed by: INTERNAL MEDICINE

## 2023-05-29 PROCEDURE — 36415 COLL VENOUS BLD VENIPUNCTURE: CPT

## 2023-05-29 PROCEDURE — 82550 ASSAY OF CK (CPK): CPT

## 2023-05-29 PROCEDURE — 6370000000 HC RX 637 (ALT 250 FOR IP): Performed by: INTERNAL MEDICINE

## 2023-05-29 PROCEDURE — 1200000000 HC SEMI PRIVATE

## 2023-05-29 RX ADMIN — SENNOSIDES AND DOCUSATE SODIUM 1 TABLET: 50; 8.6 TABLET ORAL at 21:10

## 2023-05-29 RX ADMIN — SENNOSIDES AND DOCUSATE SODIUM 1 TABLET: 50; 8.6 TABLET ORAL at 08:10

## 2023-05-29 RX ADMIN — OXYCODONE HYDROCHLORIDE 10 MG: 5 TABLET ORAL at 20:31

## 2023-05-29 RX ADMIN — OXYCODONE HYDROCHLORIDE 10 MG: 5 TABLET ORAL at 13:00

## 2023-05-29 RX ADMIN — OXYCODONE HYDROCHLORIDE 10 MG: 5 TABLET ORAL at 05:28

## 2023-05-29 RX ADMIN — Medication 1 CAPSULE: at 08:10

## 2023-05-29 RX ADMIN — AZITHROMYCIN MONOHYDRATE 500 MG: 500 INJECTION, POWDER, LYOPHILIZED, FOR SOLUTION INTRAVENOUS at 11:54

## 2023-05-29 RX ADMIN — ENOXAPARIN SODIUM 40 MG: 100 INJECTION SUBCUTANEOUS at 08:10

## 2023-05-29 RX ADMIN — CEFTRIAXONE SODIUM 1000 MG: 1 INJECTION, POWDER, FOR SOLUTION INTRAMUSCULAR; INTRAVENOUS at 10:27

## 2023-05-29 RX ADMIN — METOPROLOL TARTRATE 50 MG: 50 TABLET, FILM COATED ORAL at 20:32

## 2023-05-29 RX ADMIN — POLYETHYLENE GLYCOL 3350 17 G: 17 POWDER, FOR SOLUTION ORAL at 08:10

## 2023-05-29 RX ADMIN — AMLODIPINE BESYLATE 2.5 MG: 2.5 TABLET ORAL at 08:09

## 2023-05-29 RX ADMIN — Medication 1 CAPSULE: at 17:23

## 2023-05-29 RX ADMIN — ACETAMINOPHEN 325MG 650 MG: 325 TABLET ORAL at 20:32

## 2023-05-29 RX ADMIN — Medication 10 ML: at 08:11

## 2023-05-29 RX ADMIN — METOPROLOL TARTRATE 50 MG: 50 TABLET, FILM COATED ORAL at 08:10

## 2023-05-29 ASSESSMENT — PAIN SCALES - GENERAL
PAINLEVEL_OUTOF10: 0
PAINLEVEL_OUTOF10: 9

## 2023-05-30 VITALS
HEART RATE: 94 BPM | OXYGEN SATURATION: 91 % | DIASTOLIC BLOOD PRESSURE: 85 MMHG | RESPIRATION RATE: 16 BRPM | TEMPERATURE: 97.9 F | HEIGHT: 70 IN | BODY MASS INDEX: 24.62 KG/M2 | WEIGHT: 172 LBS | SYSTOLIC BLOOD PRESSURE: 137 MMHG

## 2023-05-30 LAB
ANION GAP SERPL CALCULATED.3IONS-SCNC: 11 MMOL/L (ref 3–16)
BUN SERPL-MCNC: 15 MG/DL (ref 7–20)
CALCIUM SERPL-MCNC: 8.8 MG/DL (ref 8.3–10.6)
CHLORIDE SERPL-SCNC: 97 MMOL/L (ref 99–110)
CO2 SERPL-SCNC: 27 MMOL/L (ref 21–32)
CREAT SERPL-MCNC: 0.6 MG/DL (ref 0.8–1.3)
EKG ATRIAL RATE: 90 BPM
EKG DIAGNOSIS: NORMAL
EKG P AXIS: 12 DEGREES
EKG P-R INTERVAL: 140 MS
EKG Q-T INTERVAL: 370 MS
EKG QRS DURATION: 100 MS
EKG QTC CALCULATION (BAZETT): 452 MS
EKG R AXIS: -46 DEGREES
EKG T AXIS: 47 DEGREES
EKG VENTRICULAR RATE: 90 BPM
GFR SERPLBLD CREATININE-BSD FMLA CKD-EPI: >60 ML/MIN/{1.73_M2}
GLUCOSE SERPL-MCNC: 102 MG/DL (ref 70–99)
POTASSIUM SERPL-SCNC: 3.5 MMOL/L (ref 3.5–5.1)
SODIUM SERPL-SCNC: 135 MMOL/L (ref 136–145)
TROPONIN, HIGH SENSITIVITY: 30 NG/L (ref 0–22)

## 2023-05-30 PROCEDURE — 6370000000 HC RX 637 (ALT 250 FOR IP): Performed by: INTERNAL MEDICINE

## 2023-05-30 PROCEDURE — 2580000003 HC RX 258: Performed by: INTERNAL MEDICINE

## 2023-05-30 PROCEDURE — 97530 THERAPEUTIC ACTIVITIES: CPT

## 2023-05-30 PROCEDURE — 80048 BASIC METABOLIC PNL TOTAL CA: CPT

## 2023-05-30 PROCEDURE — 93010 ELECTROCARDIOGRAM REPORT: CPT | Performed by: INTERNAL MEDICINE

## 2023-05-30 PROCEDURE — 93005 ELECTROCARDIOGRAM TRACING: CPT | Performed by: ANESTHESIOLOGY

## 2023-05-30 PROCEDURE — 97110 THERAPEUTIC EXERCISES: CPT

## 2023-05-30 PROCEDURE — 6360000002 HC RX W HCPCS: Performed by: INTERNAL MEDICINE

## 2023-05-30 PROCEDURE — 36415 COLL VENOUS BLD VENIPUNCTURE: CPT

## 2023-05-30 PROCEDURE — 97535 SELF CARE MNGMENT TRAINING: CPT

## 2023-05-30 PROCEDURE — 84484 ASSAY OF TROPONIN QUANT: CPT

## 2023-05-30 RX ORDER — OXYCODONE HYDROCHLORIDE 5 MG/1
5 TABLET ORAL EVERY 4 HOURS PRN
Qty: 18 TABLET | Refills: 0 | Status: SHIPPED | OUTPATIENT
Start: 2023-05-30 | End: 2023-06-02

## 2023-05-30 RX ORDER — AMLODIPINE BESYLATE 5 MG/1
2.5 TABLET ORAL DAILY
Qty: 30 TABLET | Refills: 0 | Status: SHIPPED | OUTPATIENT
Start: 2023-05-30

## 2023-05-30 RX ORDER — LIDOCAINE 4 G/G
1 PATCH TOPICAL DAILY
Qty: 7 PATCH | Refills: 0 | Status: SHIPPED | OUTPATIENT
Start: 2023-05-31 | End: 2023-06-07

## 2023-05-30 RX ORDER — LANOLIN ALCOHOL/MO/W.PET/CERES
3 CREAM (GRAM) TOPICAL NIGHTLY PRN
Qty: 30 TABLET | Refills: 0 | Status: SHIPPED | OUTPATIENT
Start: 2023-05-30

## 2023-05-30 RX ADMIN — Medication 10 ML: at 08:25

## 2023-05-30 RX ADMIN — POLYETHYLENE GLYCOL 3350 17 G: 17 POWDER, FOR SOLUTION ORAL at 08:25

## 2023-05-30 RX ADMIN — Medication 1 CAPSULE: at 08:26

## 2023-05-30 RX ADMIN — METOPROLOL TARTRATE 50 MG: 50 TABLET, FILM COATED ORAL at 08:35

## 2023-05-30 RX ADMIN — SODIUM CHLORIDE: 9 INJECTION, SOLUTION INTRAVENOUS at 10:37

## 2023-05-30 RX ADMIN — AMLODIPINE BESYLATE 2.5 MG: 2.5 TABLET ORAL at 08:30

## 2023-05-30 RX ADMIN — CEFTRIAXONE SODIUM 1000 MG: 1 INJECTION, POWDER, FOR SOLUTION INTRAMUSCULAR; INTRAVENOUS at 10:39

## 2023-05-30 RX ADMIN — SENNOSIDES AND DOCUSATE SODIUM 1 TABLET: 50; 8.6 TABLET ORAL at 08:28

## 2023-05-30 RX ADMIN — OXYCODONE HYDROCHLORIDE 10 MG: 5 TABLET ORAL at 06:17

## 2023-05-30 RX ADMIN — ENOXAPARIN SODIUM 40 MG: 100 INJECTION SUBCUTANEOUS at 08:34

## 2023-05-30 RX ADMIN — AZITHROMYCIN MONOHYDRATE 500 MG: 500 INJECTION, POWDER, LYOPHILIZED, FOR SOLUTION INTRAVENOUS at 11:58

## 2023-05-30 ASSESSMENT — PAIN DESCRIPTION - LOCATION: LOCATION: CHEST

## 2023-05-30 ASSESSMENT — PAIN SCALES - GENERAL
PAINLEVEL_OUTOF10: 8
PAINLEVEL_OUTOF10: 0

## 2023-05-30 NOTE — DISCHARGE SUMMARY
05/30/23  0514   * 134* 135*   K 3.4* 3.6 3.5   CL 98* 99 97*   CO2 24 26 27   BUN 11 21* 15   CREATININE 0.6* 0.7* 0.6*   GLUCOSE 101* 111* 102*     Hepatic: No results for input(s): AST, ALT, ALB, BILITOT, ALKPHOS in the last 72 hours. Lipids: No results found for: CHOL, HDL, TRIG  Hemoglobin A1C: No results found for: LABA1C  TSH: No results found for: TSH  Troponin: No results found for: TROPONINT  Lactic Acid: No results for input(s): LACTA in the last 72 hours. BNP: No results for input(s): PROBNP in the last 72 hours. UA:  Lab Results   Component Value Date/Time    NITRU Negative 05/26/2023 12:30 AM    COLORU Yellow 05/26/2023 12:30 AM    PHUR 6.0 05/26/2023 12:30 AM    WBCUA 0-2 05/26/2023 12:30 AM    RBCUA 0-2 05/26/2023 12:30 AM    MUCUS 2+ 05/26/2023 12:30 AM    CLARITYU Clear 05/26/2023 12:30 AM    SPECGRAV >=1.030 05/26/2023 12:30 AM    LEUKOCYTESUR Negative 05/26/2023 12:30 AM    UROBILINOGEN 0.2 05/26/2023 12:30 AM    BILIRUBINUR SMALL 05/26/2023 12:30 AM    BILIRUBINUR Neg 03/25/2010 09:33 PM    BLOODU Negative 05/26/2023 12:30 AM    GLUCOSEU Negative 05/26/2023 12:30 AM    GLUCOSEU Neg 03/25/2010 09:33 PM    KETUA 40 05/26/2023 12:30 AM     Urine Cultures:   Lab Results   Component Value Date/Time    LABURIN  10/31/2017 05:34 PM     <50,000 CFU/ml mixed skin/urogenital kee. No further workup     Blood Cultures:   Lab Results   Component Value Date/Time    BC No growth after 5 days of incubation. 10/31/2017 03:33 PM     Lab Results   Component Value Date/Time    BLOODCULT2 No growth after 5 days of incubation.  10/31/2017 03:33 PM     Organism: No results found for: ORG    Time Spent Discharging patient 30 minutes    Electronically signed by Orrin Duane, APRN - CNP on 5/30/2023 at 9:14 AM

## 2023-05-30 NOTE — DISCHARGE INSTR - COC
Continuity of Care Form    Patient Name: Abram Alexandre   :  1950  MRN:  [id number]    Admit date:  2023  Discharge date:  23    Code Status Order: Full Code   Advance Directives:     Admitting Physician:  Katty Sandy MD  PCP: No primary care provider on file. Discharging Nurse: [facility]  Unit/Room#: 6506/1986-63  Discharging Unit Phone Number: [phone]    Emergency Contact:   Extended Emergency Contact Information  Primary Emergency Contact: [address] Phone: [phone]  Relation: Brother/Sister  Secondary Emergency Contact: Jada Wiley  Address: [address] [place] [address]  [facility], [facility] Nolvia Sanders [facility],[address] Phone: [phone]  Relation: Brother/Sister    Past Surgical History:  Past Surgical History:   Procedure Laterality Date    EYE SURGERY Right 13    cataract    OTHER SURGICAL HISTORY      jaw cancer    UPPER GASTROINTESTINAL ENDOSCOPY N/A 2021    EGD BIOPSY performed by Nilton Ricardo MD at [address]       Immunization History: There is no immunization history on file for this patient.     Active Problems:  Patient Active Problem List   Diagnosis Code    Nondisplaced unspecified condyle fracture of lower end of left femur, initial encounter for closed fracture ([facility] [place] [address] S72.415A    Alcohol abuse F10.10    Lactic acidosis E87.20    Hyponatremia E87.1    Moderate protein-calorie malnutrition (HCC) E44.0    Headache R51.9    Intractable nausea and vomiting R11.2    History of oral cancer Z85.819    Dysphagia R13.10    New persistent daily headache G44.52    NPH (normal pressure hydrocephalus) (HCC) G91.2    HTN (hypertension), benign I10    Rhabdomyolysis M62.82    Closed fracture of multiple ribs of right side S22.41XA       Isolation/Infection:   Isolation            No Isolation          Patient Infection Status       None to display            Nurse Assessment:  Last Vital Signs: BP

## 2023-05-30 NOTE — CARE COORDINATION
CASE MANAGEMENT DISCHARGE SUMMARY      Discharge to: [facility]    Precertification completed: Major Hospital Exemption Notification (HENS) completed: Document ID : [id number]    IMM given: 5/30/23    New Durable Medical Equipment ordered/agency: NA    Transportation:      Medical Transport explained to pt/family. Pt/family voice no agency preference. Agency used: [address] up time:1400   Ambulance form completed: Yes    Confirmed discharge plan with:     Patient: yes     Family:  yes, call placed to [facility], busy signal.  Sister Debra Barrientos notified and agreeable     Facility/Agency, name:  [facility] to obtain from [facility] number for report to facility: [phone]     RN, name: Chayito    Note: Discharging nurse to complete [facility], reconcile [facility]S, and place final copy with patient's discharge packet. RN to ensure that written prescriptions for  Level II medications are sent with patient to the facility as per protocol.

## 2023-06-03 NOTE — PROGRESS NOTES
Patient complaining of right sided chest pain this morning. Perfect serve sent to hospitalist, Stat EKG and trop ordered. Pain meds given. Vitals stable.
Physical Therapy Attempt    Attempted to see patient for therapy. Unable to see patient at this time. Pt reports being fatigued and requesting therapist to return later. Will follow up as schedule permits.     Tangela Pro,   [facility]
Physician Progress Note      PATIENT:               Mukund Carrasco  [facility] #:                  [id number]  :                       1950  ADMIT DATE:       2023 6:14 PM  [address] DATE:        2023 2:34 PM  RESPONDING  PROVIDER #:        Wen Philip MD          QUERY TEXT:    Patient admitted with rhabdomyolysis. Noted documentation of PNA on DC summary   . In order to support the diagnosis of PNA, please include additional   clinical indicators in your documentation. Or please document if the   diagnosis of PNA has been ruled out after further study. The medical record reflects the following:  Risk Factors: Fall at home on the floor for 3 days, carcinoma  Clinical Indicators: Not discharged on ABX  DC Summary -  \"Pneumonia, unspecified, resolved  - Completed ceftriaxone IV and azithromycin IV x3 days. Culture negative, D/C   abx  - continue with spirometer and will get flutter valve\"  Treatment: ceftriaxone IV and azithromycin IV x3 days, serial labs, and   supportive care    Thank you,  Delma Olmos, RN, BSN, CRCR  Options provided:  -- PNA present as evidenced by, Please document evidence. -- PNA was ruled out  -- Other - I will add my own diagnosis  -- Disagree - Not applicable / Not valid  -- Disagree - Clinically unable to determine / Unknown  -- Refer to Clinical Documentation Reviewer    PROVIDER RESPONSE TEXT:    PNA was ruled out after study.     Query created by: Jair Dumas on 2023 2:56 PM      Electronically signed by:  Wen Philip MD 6/3/2023 4:11 PM
Report called to [facility]. Outside transport given d/c paperwork and oxycodone prescription.
V2.0    [facility] Progress Note      Name:  Roya Roberson /Age/Sex: 1950  (67 y.o. male)   MRN & CSN:  [id number] & [id number] Encounter Date/Time: 2023 10:03 AM EDT   Location:  [facility]/7278-03 PCP: No primary care provider on file. Attending:Keshav Salas MD       Hospital Day: 5    Assessment and Recommendations   Roya Roberson is a 67 y.o. male with pmh of HTN, sig for history of oral squamous cell carcinoma diagnosed in . He is s/p partial glossectomy and composite mandibulectomy in  followed by radiation therapywho presents with Rhabdomyolysis      Plan:   Acute rhabdo, resolved   - improved - from 3300 down to 195  - stop IVF and encourage oral intake  -Discharge in am      Multiple R rib fx - ribs 7, 8, 9   - narcotics and lidocaine patch   - will need to use incentive spirometer as ordered and will get cough pillow   - change percocet to every 4 hours prn   - watch for oversedation   - scheduled laxatives      Suspected gram neg pneumonia   - started on ceftriaxone and azithromycin   - continue with spirometer and will get flutter valve      Tachycardia   - occurs more with pain   - increase metoprolol to 50mg bid      Weakness and falls   - could be due to NPH   - unclear   - he will need SNF placement   -PT/OT  - cleared for discharge with precert on Tuesday    HTN   - resume low dose norvasc and Lopressor with holding parameters      Hypokalemia, resolved  Potassium 3.6 today   - replace per protocol - mag is normal      Diet ADULT DIET;  Dysphagia - Minced and Moist  ADULT ORAL NUTRITION SUPPLEMENT; Breakfast, Lunch, Dinner; Standard High Calorie/High Protein Oral Supplement   DVT Prophylaxis [x] Lovenox, []  Heparin, [] SCDs, [] Ambulation,  [] Eliquis, [] Xarelto  [] Coumadin   Code Status Full Code   Disposition From: Home  Expected Disposition: Home  Estimated Date of Discharge: 23  Patient requires continued admission due to Pending precert   Surrogate Decision Maker/
noted--ALL GOALS ONGOING 5/30/23  Short Term Goal 1: pt will complete seated grooming task set up 5/31/2023  Short Term Goal 2: pt will complete toilet transfer [address] [place] Goal 3: pt will complete toileting Yasmany  Patient Goals   Patient goals : \"Leave here\"     Therapy Time   Individual Concurrent Group Co-treatment   Time In 1015         Time Out 1056         Minutes 41         Timed Code Treatment Minutes: 39 Minutes   If pt is discharged prior to next OT session, this note will serve as the discharge summary.   Nikhil Pruitt OT

## 2023-10-09 ENCOUNTER — APPOINTMENT (OUTPATIENT)
Dept: CT IMAGING | Age: 73
DRG: 202 | End: 2023-10-09
Payer: MEDICARE

## 2023-10-09 ENCOUNTER — ANCILLARY PROCEDURE (OUTPATIENT)
Dept: EMERGENCY DEPT | Age: 73
DRG: 202 | End: 2023-10-09
Attending: EMERGENCY MEDICINE
Payer: MEDICARE

## 2023-10-09 ENCOUNTER — APPOINTMENT (OUTPATIENT)
Dept: GENERAL RADIOLOGY | Age: 73
DRG: 202 | End: 2023-10-09
Payer: MEDICARE

## 2023-10-09 ENCOUNTER — HOSPITAL ENCOUNTER (INPATIENT)
Age: 73
LOS: 1 days | Discharge: HOME | DRG: 202 | End: 2023-10-11
Attending: EMERGENCY MEDICINE | Admitting: INTERNAL MEDICINE
Payer: MEDICARE

## 2023-10-09 DIAGNOSIS — R06.00 DYSPNEA AND RESPIRATORY ABNORMALITIES: ICD-10-CM

## 2023-10-09 DIAGNOSIS — J18.9 PNEUMONIA OF RIGHT LUNG DUE TO INFECTIOUS ORGANISM, UNSPECIFIED PART OF LUNG: Primary | ICD-10-CM

## 2023-10-09 DIAGNOSIS — R09.02 HYPOXIA: ICD-10-CM

## 2023-10-09 DIAGNOSIS — R06.89 DYSPNEA AND RESPIRATORY ABNORMALITIES: ICD-10-CM

## 2023-10-09 LAB
ALBUMIN SERPL-MCNC: 4.7 G/DL (ref 3.4–5)
ALBUMIN/GLOB SERPL: 1.9 {RATIO} (ref 1.1–2.2)
ALP SERPL-CCNC: 80 U/L (ref 40–129)
ALT SERPL-CCNC: 14 U/L (ref 10–40)
ANION GAP SERPL CALCULATED.3IONS-SCNC: 12 MMOL/L (ref 3–16)
AST SERPL-CCNC: 27 U/L (ref 15–37)
BASE EXCESS BLDV CALC-SCNC: -0.7 MMOL/L (ref -3–3)
BASOPHILS # BLD: 0.1 K/UL (ref 0–0.2)
BASOPHILS NFR BLD: 0.6 %
BILIRUB SERPL-MCNC: 0.4 MG/DL (ref 0–1)
BUN SERPL-MCNC: 10 MG/DL (ref 7–20)
CALCIUM SERPL-MCNC: 9.7 MG/DL (ref 8.3–10.6)
CHLORIDE SERPL-SCNC: 95 MMOL/L (ref 99–110)
CO2 BLDV-SCNC: 29 MMOL/L
CO2 SERPL-SCNC: 26 MMOL/L (ref 21–32)
COHGB MFR BLDV: 3.7 % (ref 0–1.5)
CREAT SERPL-MCNC: 0.7 MG/DL (ref 0.8–1.3)
DEPRECATED RDW RBC AUTO: 15.3 % (ref 12.4–15.4)
EOSINOPHIL # BLD: 0.2 K/UL (ref 0–0.6)
EOSINOPHIL NFR BLD: 2 %
FLUAV RNA RESP QL NAA+PROBE: NOT DETECTED
FLUBV RNA RESP QL NAA+PROBE: NOT DETECTED
GFR SERPLBLD CREATININE-BSD FMLA CKD-EPI: >60 ML/MIN/{1.73_M2}
GLUCOSE SERPL-MCNC: 107 MG/DL (ref 70–99)
HCO3 BLDV-SCNC: 27.4 MMOL/L (ref 23–29)
HCT VFR BLD AUTO: 46.2 % (ref 40.5–52.5)
HGB BLD-MCNC: 15.5 G/DL (ref 13.5–17.5)
LACTATE BLDV-SCNC: 2.6 MMOL/L (ref 0.4–1.9)
LYMPHOCYTES # BLD: 0.6 K/UL (ref 1–5.1)
LYMPHOCYTES NFR BLD: 7.4 %
MAGNESIUM SERPL-MCNC: 1.9 MG/DL (ref 1.8–2.4)
MCH RBC QN AUTO: 31.3 PG (ref 26–34)
MCHC RBC AUTO-ENTMCNC: 33.6 G/DL (ref 31–36)
MCV RBC AUTO: 93.3 FL (ref 80–100)
METHGB MFR BLDV: 0.3 %
MONOCYTES # BLD: 0.1 K/UL (ref 0–1.3)
MONOCYTES NFR BLD: 0.9 %
NEUTROPHILS # BLD: 7 K/UL (ref 1.7–7.7)
NEUTROPHILS NFR BLD: 89.1 %
NT-PROBNP SERPL-MCNC: 384 PG/ML (ref 0–124)
O2 THERAPY: ABNORMAL
PCO2 BLDV: 58.5 MMHG (ref 40–50)
PH BLDV: 7.29 [PH] (ref 7.35–7.45)
PLATELET # BLD AUTO: 202 K/UL (ref 135–450)
PMV BLD AUTO: 7.1 FL (ref 5–10.5)
PO2 BLDV: 25.5 MMHG (ref 25–40)
POTASSIUM SERPL-SCNC: 3.5 MMOL/L (ref 3.5–5.1)
PROT SERPL-MCNC: 7.2 G/DL (ref 6.4–8.2)
RBC # BLD AUTO: 4.95 M/UL (ref 4.2–5.9)
SAO2 % BLDV: 39 %
SARS-COV-2 RNA RESP QL NAA+PROBE: NOT DETECTED
SODIUM SERPL-SCNC: 133 MMOL/L (ref 136–145)
TROPONIN, HIGH SENSITIVITY: 38 NG/L (ref 0–22)
TROPONIN, HIGH SENSITIVITY: 40 NG/L (ref 0–22)
WBC # BLD AUTO: 7.9 K/UL (ref 4–11)

## 2023-10-09 PROCEDURE — 2580000003 HC RX 258: Performed by: NURSE PRACTITIONER

## 2023-10-09 PROCEDURE — 85025 COMPLETE CBC W/AUTO DIFF WBC: CPT

## 2023-10-09 PROCEDURE — 96361 HYDRATE IV INFUSION ADD-ON: CPT

## 2023-10-09 PROCEDURE — 83880 ASSAY OF NATRIURETIC PEPTIDE: CPT

## 2023-10-09 PROCEDURE — 6360000004 HC RX CONTRAST MEDICATION: Performed by: NURSE PRACTITIONER

## 2023-10-09 PROCEDURE — 87636 SARSCOV2 & INF A&B AMP PRB: CPT

## 2023-10-09 PROCEDURE — 84484 ASSAY OF TROPONIN QUANT: CPT

## 2023-10-09 PROCEDURE — 71045 X-RAY EXAM CHEST 1 VIEW: CPT

## 2023-10-09 PROCEDURE — 83735 ASSAY OF MAGNESIUM: CPT

## 2023-10-09 PROCEDURE — 99285 EMERGENCY DEPT VISIT HI MDM: CPT

## 2023-10-09 PROCEDURE — 36415 COLL VENOUS BLD VENIPUNCTURE: CPT

## 2023-10-09 PROCEDURE — 6360000002 HC RX W HCPCS: Performed by: NURSE PRACTITIONER

## 2023-10-09 PROCEDURE — 6370000000 HC RX 637 (ALT 250 FOR IP): Performed by: NURSE PRACTITIONER

## 2023-10-09 PROCEDURE — 96375 TX/PRO/DX INJ NEW DRUG ADDON: CPT

## 2023-10-09 PROCEDURE — 96365 THER/PROPH/DIAG IV INF INIT: CPT

## 2023-10-09 PROCEDURE — 80053 COMPREHEN METABOLIC PANEL: CPT

## 2023-10-09 PROCEDURE — 87186 SC STD MICRODIL/AGAR DIL: CPT

## 2023-10-09 PROCEDURE — 93005 ELECTROCARDIOGRAM TRACING: CPT | Performed by: NURSE PRACTITIONER

## 2023-10-09 PROCEDURE — 6360000002 HC RX W HCPCS: Performed by: EMERGENCY MEDICINE

## 2023-10-09 PROCEDURE — 93308 TTE F-UP OR LMTD: CPT

## 2023-10-09 PROCEDURE — 84145 PROCALCITONIN (PCT): CPT

## 2023-10-09 PROCEDURE — 82803 BLOOD GASES ANY COMBINATION: CPT

## 2023-10-09 PROCEDURE — 87150 DNA/RNA AMPLIFIED PROBE: CPT

## 2023-10-09 PROCEDURE — 87040 BLOOD CULTURE FOR BACTERIA: CPT

## 2023-10-09 PROCEDURE — 71260 CT THORAX DX C+: CPT

## 2023-10-09 PROCEDURE — 83605 ASSAY OF LACTIC ACID: CPT

## 2023-10-09 RX ORDER — IPRATROPIUM BROMIDE AND ALBUTEROL SULFATE 2.5; .5 MG/3ML; MG/3ML
1 SOLUTION RESPIRATORY (INHALATION) ONCE
Status: COMPLETED | OUTPATIENT
Start: 2023-10-09 | End: 2023-10-09

## 2023-10-09 RX ORDER — 0.9 % SODIUM CHLORIDE 0.9 %
1200 INTRAVENOUS SOLUTION INTRAVENOUS ONCE
Status: COMPLETED | OUTPATIENT
Start: 2023-10-09 | End: 2023-10-10

## 2023-10-09 RX ORDER — 0.9 % SODIUM CHLORIDE 0.9 %
1000 INTRAVENOUS SOLUTION INTRAVENOUS ONCE
Status: COMPLETED | OUTPATIENT
Start: 2023-10-09 | End: 2023-10-09

## 2023-10-09 RX ORDER — METHYLPREDNISOLONE SODIUM SUCCINATE 1 G/16ML
125 INJECTION, POWDER, LYOPHILIZED, FOR SOLUTION INTRAMUSCULAR; INTRAVENOUS ONCE
Status: COMPLETED | OUTPATIENT
Start: 2023-10-09 | End: 2023-10-09

## 2023-10-09 RX ADMIN — SODIUM CHLORIDE 1200 ML: 9 INJECTION, SOLUTION INTRAVENOUS at 23:58

## 2023-10-09 RX ADMIN — METHYLPREDNISOLONE SODIUM SUCCINATE 125 MG: 125 INJECTION INTRAMUSCULAR; INTRAVENOUS at 22:45

## 2023-10-09 RX ADMIN — AMPICILLIN AND SULBACTAM 3000 MG: 2; 1 INJECTION, POWDER, FOR SOLUTION INTRAVENOUS at 23:58

## 2023-10-09 RX ADMIN — IOPAMIDOL 75 ML: 755 INJECTION, SOLUTION INTRAVENOUS at 22:24

## 2023-10-09 RX ADMIN — SODIUM CHLORIDE 1000 ML: 9 INJECTION, SOLUTION INTRAVENOUS at 22:11

## 2023-10-09 RX ADMIN — IPRATROPIUM BROMIDE AND ALBUTEROL SULFATE 1 DOSE: .5; 2.5 SOLUTION RESPIRATORY (INHALATION) at 21:33

## 2023-10-09 ASSESSMENT — ENCOUNTER SYMPTOMS
EYE PAIN: 0
VOMITING: 0
NAUSEA: 1
BACK PAIN: 0
SHORTNESS OF BREATH: 1
RHINORRHEA: 0
ABDOMINAL PAIN: 0
COUGH: 0
SORE THROAT: 0

## 2023-10-09 ASSESSMENT — PAIN - FUNCTIONAL ASSESSMENT: PAIN_FUNCTIONAL_ASSESSMENT: NONE - DENIES PAIN

## 2023-10-10 ENCOUNTER — TELEPHONE (OUTPATIENT)
Dept: CARDIOLOGY CLINIC | Age: 73
End: 2023-10-10

## 2023-10-10 DIAGNOSIS — R55 SYNCOPE, UNSPECIFIED SYNCOPE TYPE: Primary | ICD-10-CM

## 2023-10-10 PROBLEM — J96.00 ACUTE RESPIRATORY FAILURE (HCC): Status: ACTIVE | Noted: 2023-10-10

## 2023-10-10 LAB
ALBUMIN SERPL-MCNC: 4.2 G/DL (ref 3.4–5)
ALBUMIN/GLOB SERPL: 1.8 {RATIO} (ref 1.1–2.2)
ALP SERPL-CCNC: 66 U/L (ref 40–129)
ALT SERPL-CCNC: 15 U/L (ref 10–40)
ANION GAP SERPL CALCULATED.3IONS-SCNC: 15 MMOL/L (ref 3–16)
AST SERPL-CCNC: 25 U/L (ref 15–37)
BASE EXCESS BLDV CALC-SCNC: -5.9 MMOL/L (ref -3–3)
BASOPHILS # BLD: 0 K/UL (ref 0–0.2)
BASOPHILS NFR BLD: 0 %
BILIRUB SERPL-MCNC: 0.5 MG/DL (ref 0–1)
BUN SERPL-MCNC: 9 MG/DL (ref 7–20)
CALCIUM SERPL-MCNC: 8.7 MG/DL (ref 8.3–10.6)
CHLORIDE SERPL-SCNC: 99 MMOL/L (ref 99–110)
CHOLEST SERPL-MCNC: 176 MG/DL (ref 0–199)
CK SERPL-CCNC: 182 U/L (ref 39–308)
CO2 BLDV-SCNC: 21 MMOL/L
CO2 SERPL-SCNC: 22 MMOL/L (ref 21–32)
COHGB MFR BLDV: 2.8 % (ref 0–1.5)
CREAT SERPL-MCNC: 0.7 MG/DL (ref 0.8–1.3)
DEPRECATED RDW RBC AUTO: 15.2 % (ref 12.4–15.4)
EKG ATRIAL RATE: 124 BPM
EKG ATRIAL RATE: 99 BPM
EKG DIAGNOSIS: NORMAL
EKG DIAGNOSIS: NORMAL
EKG P AXIS: 60 DEGREES
EKG P-R INTERVAL: 150 MS
EKG P-R INTERVAL: 176 MS
EKG Q-T INTERVAL: 326 MS
EKG Q-T INTERVAL: 388 MS
EKG QRS DURATION: 100 MS
EKG QRS DURATION: 96 MS
EKG QTC CALCULATION (BAZETT): 468 MS
EKG QTC CALCULATION (BAZETT): 497 MS
EKG R AXIS: -52 DEGREES
EKG R AXIS: -55 DEGREES
EKG T AXIS: 114 DEGREES
EKG T AXIS: 120 DEGREES
EKG VENTRICULAR RATE: 124 BPM
EKG VENTRICULAR RATE: 99 BPM
EOSINOPHIL # BLD: 0 K/UL (ref 0–0.6)
EOSINOPHIL NFR BLD: 0 %
GFR SERPLBLD CREATININE-BSD FMLA CKD-EPI: >60 ML/MIN/{1.73_M2}
GLUCOSE SERPL-MCNC: 168 MG/DL (ref 70–99)
HCO3 BLDV-SCNC: 19.5 MMOL/L (ref 23–29)
HCT VFR BLD AUTO: 44.7 % (ref 40.5–52.5)
HDLC SERPL-MCNC: 87 MG/DL (ref 40–60)
HGB BLD-MCNC: 15 G/DL (ref 13.5–17.5)
LACTATE BLDV-SCNC: 2 MMOL/L (ref 0.4–2)
LACTATE BLDV-SCNC: 2.3 MMOL/L (ref 0.4–1.9)
LACTATE BLDV-SCNC: 3.2 MMOL/L (ref 0.4–2)
LDLC SERPL CALC-MCNC: 61 MG/DL
LYMPHOCYTES # BLD: 0.2 K/UL (ref 1–5.1)
LYMPHOCYTES NFR BLD: 2.1 %
MCH RBC QN AUTO: 31.2 PG (ref 26–34)
MCHC RBC AUTO-ENTMCNC: 33.6 G/DL (ref 31–36)
MCV RBC AUTO: 92.9 FL (ref 80–100)
METHGB MFR BLDV: 0.1 %
MONOCYTES # BLD: 0.3 K/UL (ref 0–1.3)
MONOCYTES NFR BLD: 2.6 %
NEUTROPHILS # BLD: 10.4 K/UL (ref 1.7–7.7)
NEUTROPHILS NFR BLD: 95.3 %
O2 THERAPY: ABNORMAL
PCO2 BLDV: 38.1 MMHG (ref 40–50)
PH BLDV: 7.33 [PH] (ref 7.35–7.45)
PLATELET # BLD AUTO: 186 K/UL (ref 135–450)
PMV BLD AUTO: 7.2 FL (ref 5–10.5)
PO2 BLDV: 87.1 MMHG (ref 25–40)
POTASSIUM SERPL-SCNC: 4 MMOL/L (ref 3.5–5.1)
PROCALCITONIN SERPL IA-MCNC: 0.08 NG/ML (ref 0–0.15)
PROT SERPL-MCNC: 6.5 G/DL (ref 6.4–8.2)
RBC # BLD AUTO: 4.81 M/UL (ref 4.2–5.9)
SAO2 % BLDV: 96 %
SODIUM SERPL-SCNC: 136 MMOL/L (ref 136–145)
TRIGL SERPL-MCNC: 142 MG/DL (ref 0–150)
TROPONIN, HIGH SENSITIVITY: 24 NG/L (ref 0–22)
TSH SERPL DL<=0.005 MIU/L-ACNC: 2.12 UIU/ML (ref 0.27–4.2)
VLDLC SERPL CALC-MCNC: 28 MG/DL
WBC # BLD AUTO: 10.9 K/UL (ref 4–11)

## 2023-10-10 PROCEDURE — 97530 THERAPEUTIC ACTIVITIES: CPT

## 2023-10-10 PROCEDURE — 96366 THER/PROPH/DIAG IV INF ADDON: CPT

## 2023-10-10 PROCEDURE — 82550 ASSAY OF CK (CPK): CPT

## 2023-10-10 PROCEDURE — 97535 SELF CARE MNGMENT TRAINING: CPT

## 2023-10-10 PROCEDURE — 97166 OT EVAL MOD COMPLEX 45 MIN: CPT

## 2023-10-10 PROCEDURE — 84443 ASSAY THYROID STIM HORMONE: CPT

## 2023-10-10 PROCEDURE — 99222 1ST HOSP IP/OBS MODERATE 55: CPT | Performed by: INTERNAL MEDICINE

## 2023-10-10 PROCEDURE — 84484 ASSAY OF TROPONIN QUANT: CPT

## 2023-10-10 PROCEDURE — 36415 COLL VENOUS BLD VENIPUNCTURE: CPT

## 2023-10-10 PROCEDURE — 97162 PT EVAL MOD COMPLEX 30 MIN: CPT

## 2023-10-10 PROCEDURE — 6360000002 HC RX W HCPCS: Performed by: INTERNAL MEDICINE

## 2023-10-10 PROCEDURE — 80053 COMPREHEN METABOLIC PANEL: CPT

## 2023-10-10 PROCEDURE — 83605 ASSAY OF LACTIC ACID: CPT

## 2023-10-10 PROCEDURE — 6360000002 HC RX W HCPCS: Performed by: NURSE PRACTITIONER

## 2023-10-10 PROCEDURE — 2580000003 HC RX 258: Performed by: NURSE PRACTITIONER

## 2023-10-10 PROCEDURE — 97116 GAIT TRAINING THERAPY: CPT

## 2023-10-10 PROCEDURE — 6370000000 HC RX 637 (ALT 250 FOR IP): Performed by: INTERNAL MEDICINE

## 2023-10-10 PROCEDURE — 82803 BLOOD GASES ANY COMBINATION: CPT

## 2023-10-10 PROCEDURE — 93010 ELECTROCARDIOGRAM REPORT: CPT | Performed by: INTERNAL MEDICINE

## 2023-10-10 PROCEDURE — 2060000000 HC ICU INTERMEDIATE R&B

## 2023-10-10 PROCEDURE — 80061 LIPID PANEL: CPT

## 2023-10-10 PROCEDURE — 93306 TTE W/DOPPLER COMPLETE: CPT

## 2023-10-10 PROCEDURE — 96365 THER/PROPH/DIAG IV INF INIT: CPT

## 2023-10-10 PROCEDURE — 85025 COMPLETE CBC W/AUTO DIFF WBC: CPT

## 2023-10-10 PROCEDURE — 2580000003 HC RX 258: Performed by: INTERNAL MEDICINE

## 2023-10-10 RX ORDER — MAGNESIUM SULFATE IN WATER 40 MG/ML
2000 INJECTION, SOLUTION INTRAVENOUS PRN
Status: DISCONTINUED | OUTPATIENT
Start: 2023-10-10 | End: 2023-10-11 | Stop reason: HOSPADM

## 2023-10-10 RX ORDER — POTASSIUM CHLORIDE 7.45 MG/ML
10 INJECTION INTRAVENOUS PRN
Status: DISCONTINUED | OUTPATIENT
Start: 2023-10-10 | End: 2023-10-11 | Stop reason: HOSPADM

## 2023-10-10 RX ORDER — SODIUM CHLORIDE 9 MG/ML
INJECTION, SOLUTION INTRAVENOUS PRN
Status: DISCONTINUED | OUTPATIENT
Start: 2023-10-10 | End: 2023-10-11 | Stop reason: HOSPADM

## 2023-10-10 RX ORDER — AMLODIPINE BESYLATE 5 MG/1
5 TABLET ORAL DAILY
Status: DISCONTINUED | OUTPATIENT
Start: 2023-10-10 | End: 2023-10-11 | Stop reason: HOSPADM

## 2023-10-10 RX ORDER — LANOLIN ALCOHOL/MO/W.PET/CERES
3 CREAM (GRAM) TOPICAL NIGHTLY PRN
Status: DISCONTINUED | OUTPATIENT
Start: 2023-10-10 | End: 2023-10-11 | Stop reason: HOSPADM

## 2023-10-10 RX ORDER — ENOXAPARIN SODIUM 100 MG/ML
40 INJECTION SUBCUTANEOUS DAILY
Status: DISCONTINUED | OUTPATIENT
Start: 2023-10-10 | End: 2023-10-11 | Stop reason: HOSPADM

## 2023-10-10 RX ORDER — SODIUM CHLORIDE 0.9 % (FLUSH) 0.9 %
10 SYRINGE (ML) INJECTION EVERY 12 HOURS SCHEDULED
Status: DISCONTINUED | OUTPATIENT
Start: 2023-10-10 | End: 2023-10-11 | Stop reason: HOSPADM

## 2023-10-10 RX ORDER — ACETAMINOPHEN 325 MG/1
650 TABLET ORAL EVERY 6 HOURS PRN
Status: DISCONTINUED | OUTPATIENT
Start: 2023-10-10 | End: 2023-10-11 | Stop reason: HOSPADM

## 2023-10-10 RX ORDER — SODIUM CHLORIDE 0.9 % (FLUSH) 0.9 %
10 SYRINGE (ML) INJECTION PRN
Status: DISCONTINUED | OUTPATIENT
Start: 2023-10-10 | End: 2023-10-11 | Stop reason: HOSPADM

## 2023-10-10 RX ORDER — PROMETHAZINE HYDROCHLORIDE 25 MG/1
12.5 TABLET ORAL EVERY 6 HOURS PRN
Status: DISCONTINUED | OUTPATIENT
Start: 2023-10-10 | End: 2023-10-11 | Stop reason: HOSPADM

## 2023-10-10 RX ORDER — ONDANSETRON 2 MG/ML
4 INJECTION INTRAMUSCULAR; INTRAVENOUS EVERY 6 HOURS PRN
Status: DISCONTINUED | OUTPATIENT
Start: 2023-10-10 | End: 2023-10-11 | Stop reason: HOSPADM

## 2023-10-10 RX ORDER — ACETAMINOPHEN 650 MG/1
650 SUPPOSITORY RECTAL EVERY 6 HOURS PRN
Status: DISCONTINUED | OUTPATIENT
Start: 2023-10-10 | End: 2023-10-11 | Stop reason: HOSPADM

## 2023-10-10 RX ORDER — NICOTINE 21 MG/24HR
1 PATCH, TRANSDERMAL 24 HOURS TRANSDERMAL DAILY
Status: DISCONTINUED | OUTPATIENT
Start: 2023-10-10 | End: 2023-10-10

## 2023-10-10 RX ADMIN — METOPROLOL TARTRATE 25 MG: 25 TABLET, FILM COATED ORAL at 20:26

## 2023-10-10 RX ADMIN — METOPROLOL TARTRATE 25 MG: 25 TABLET, FILM COATED ORAL at 10:54

## 2023-10-10 RX ADMIN — AMPICILLIN AND SULBACTAM 3000 MG: 2; 1 INJECTION, POWDER, FOR SOLUTION INTRAVENOUS at 17:39

## 2023-10-10 RX ADMIN — Medication 10 ML: at 20:26

## 2023-10-10 RX ADMIN — ENOXAPARIN SODIUM 40 MG: 100 INJECTION SUBCUTANEOUS at 10:54

## 2023-10-10 RX ADMIN — AMPICILLIN AND SULBACTAM 3000 MG: 2; 1 INJECTION, POWDER, FOR SOLUTION INTRAVENOUS at 22:50

## 2023-10-10 RX ADMIN — SODIUM CHLORIDE: 9 INJECTION, SOLUTION INTRAVENOUS at 05:18

## 2023-10-10 RX ADMIN — Medication 10 ML: at 10:55

## 2023-10-10 RX ADMIN — AMPICILLIN AND SULBACTAM 3000 MG: 2; 1 INJECTION, POWDER, FOR SOLUTION INTRAVENOUS at 10:53

## 2023-10-10 RX ADMIN — AMLODIPINE BESYLATE 5 MG: 5 TABLET ORAL at 10:54

## 2023-10-10 RX ADMIN — AMPICILLIN AND SULBACTAM 3000 MG: 2; 1 INJECTION, POWDER, FOR SOLUTION INTRAVENOUS at 05:21

## 2023-10-10 RX ADMIN — Medication 1500 MG: at 00:34

## 2023-10-10 NOTE — DISCHARGE INSTRUCTIONS
FOLLOW-UP APPOINTMENTS    [facility] OFFICE - Appointment on December 6, 2023 at 10:15 am with Dr. Arnulfo Small, [address]. [facility],  [facility], [address] [facility], [facility]. Office #: [phone]. If you are unable to make this appointment, please call to reschedule. Directions to [place] [address] towards [place]. [address] exit. Right off exit. Cross over [employer]. Right on [address] Left into hospital. Follow the signs to the emergency room ( turn left toward the Emergency room). Go right at the first stop sign. Just past the Emergency room at the second stop sign turn right and go up the ramp and park on the top level if possible. Go in the glass doors of the [facility] we on the top level of the garage [address]. As soon as you get in the door turn left and our office is the one with the glass doors.

## 2023-10-10 NOTE — PROGRESS NOTES
Telemetry Assignment Communication Form    Patient has orders for continuous telemetry OR pulse oximeter only orders    To be filled out by Clinical    Patient has Admission or Transfer orders and is assigned to Room Number: 306      (Once this top section is completed:  Select \"Route\" and send note to Fax number: 21 ))      ___________________________________________________________________________      [facility]    Patient assigned to tele box number: __________________               (to be written in by [address] when telemetry box is assigned to patient)    ___________________________________________________________________________      Bedside RN confirming that the box listed above is in fact the telemetry box number being placed on the patient listed above.         X________________________________________ RN signature        __________________________________________RN assigned to Patient (please print)    _______________ Date    ____________ Time

## 2023-10-10 NOTE — H&P
Hospital Medicine History & Physical      PCP: No primary care provider on file. Date of Admission: 10/10/2023    Date of Service: Pt seen/examined on 10/10/2023    Pt seen/examined face to face on and admitted as inpatient with expected LOS to be two days but can change depending on diagnostic work up and treatment response. Chief Complaint:    Chief Complaint   Patient presents with    Shortness of Breath     X1hr after flu shot, 69% RA on EMS arrival        History Of Present Illness:      68 y.o. male who presented to [facility] with past medical history of cancer status post radiation in the jaw presented to the ED due to chief complaint of shortness of breath    Patient reported shortness of progressively worsening throughout the day exacerbated on exertion no alleviating factors reported has been compliant with medications. No associated fever chills cough nonproduction abdominal pain or dysuria. Patient reports that he took a flu shot for his first time in his life and reported that his shortness of breath and hypoxia started since then. Past Medical History:          Diagnosis Date    Cancer ([address])     jaw    Radiation     S/P chemotherapy, time since greater than 12 weeks        Past Surgical History:          Procedure Laterality Date    EYE SURGERY Right 12/26/13    cataract    OTHER SURGICAL HISTORY      jaw cancer    UPPER GASTROINTESTINAL ENDOSCOPY N/A 7/26/2021    EGD BIOPSY performed by Hugo Nixon MD at [address]       Medications Prior to Admission:      Prior to Admission medications    Medication Sig Start Date End Date Taking?  Authorizing Provider   amLODIPine (NORVASC) 5 MG tablet Take 0.5 tablets by mouth daily 5/30/23   ROSALBA Aguirre CNP   melatonin 3 MG TABS tablet Take 1 tablet by mouth nightly as needed (insomnia) 5/30/23   ROSALBA Aguirre CNP   butalbital-acetaminophen-caffeine (FIORICET, ESGIC) -40 MG per tablet Take 1 tablet ED  Patient was placed in 2L  Resolved on my evaluation satting 96% on room air  Ambulatory desaturation Home O2 evaluation    NSTEMI: Etiology unclear at this time  CK normal,    CTA showing no PE however noted to have bronchitis  Patient noted to have inappropriate tachycardia  Echocardiogram ordered  Cardiology consulted, appreciated    Esophagitis: Wall thickening noted on the esophagus  PPI  Outpatient follow-up with GI    Essential Hypertension: Reviewed patient's medications and plan is to continue home medication    Discussion with the primary ER physician in regards to symptoms, history, physical exam, diagnosis and treatment, collaborative decision was to admit the patient.     Diet: Advance as tolerated    DVT Prophylaxis: lovenox    Dispo:   Expected LOS of two days         Tristan Dumont DO

## 2023-10-10 NOTE — PROGRESS NOTES
Patient admitted to room 306 from ER. Patient oriented to room, call light, bed rails, phone, lights and bathroom. Patient instructed about the schedule of the day including: vital sign frequency, lab draws, possible tests, frequency of MD and staff rounds, daily weights, I &O's and prescribed diet. 47 Telemetry box in place, patient aware of placement and reason. Bed locked, in lowest position, side rails up 2/4, call light within reach. Recliner Assessment  Patient is not able to demonstrated the ability to move from a reclining position to an upright position within the recliner. however patient is alert, oriented and able to provide informed consent       4 Eyes Skin Assessment     NAME:  Christopher Lo  YOB: 1950  MEDICAL RECORD NUMBER:  [id number]    The patient is being assessed for  Admission    I agree that at least one RN has performed a thorough Head to Toe Skin Assessment on the patient. ALL assessment sites listed below have been assessed. Areas assessed by both nurses:  Scattered bruising    Head, Face, Ears, Shoulders, Back, Chest, Arms, Elbows, Hands, Sacrum. Buttock, Coccyx, Ischium, Legs. Feet and Heels, and Under Medical Devices         Does the Patient have a Wound?  No noted wound(s)       Stanislav Prevention initiated by RN: No  Wound Care Orders initiated by RN: No    Pressure Injury (Stage 3,4, Unstageable, DTI, NWPT, and Complex wounds) if present, place Wound referral order by RN under : No    New Ostomies, if present place, Ostomy referral order under : No     Nurse 1 eSignature: Electronically signed by Waldo Moscoso RN on 10/10/23 at 5:22 AM EDT    **SHARE this note so that the co-signing nurse can place an eSignature**    Nurse 2 eSignature: Electronically signed by Palmira Clay RN on 10/10/23 at 8:59 AM EDT

## 2023-10-10 NOTE — ED PROVIDER NOTES
I independently performed a history and physical on [employer]. All diagnostic, treatment, and disposition decisions were made by myself in conjunction with the advanced practice provider/resident. For further details of [facility] emergency department encounter, please see the CLARITA/resident's documentation. I personally saw the patient and performed a substantive portion of the visit including all aspects of the medical decision making. Briefly, this is a 40-year-old male presenting for evaluation of shortness of breath. He has had worsening shortness of breath throughout the day. He has had increased oxygen requirements. He is tachycardic upon arrival.  He is mildly hypertensive. Viral screen is negative. He does have mildly elevated troponin without significant delta. He does have mild respiratory acidosis, increased lactic acid level to 2.6. Bedside echo is overall unremarkable. Suspect pulmonary source of infection and will be started on broad-spectrum antibiotics. CT chest without findings of pulmonary embolism. Lactate is 2.3. Respiratory acidosis is improving on repeat VBG. EKG  The Ekg interpreted by myself in the emergency department in the absence of a cardiologist.  sinus tachycardia, ihba=276  with a rate of 124  Axis is   Left axis deviation  QTc is  within an acceptable range  Intervals and Durations are unremarkable. No specific ST-T wave changes appreciated. No evidence of acute ischemia. No significant change from prior EKG dated October 9, 2023      [facility]    Result Date: 10/9/2023  POCUS_Cardiac     Exam Information:          Exam type:  Clinically indicated     Indication(s) for Exam:          The exam was performed with the following indications[de-identified]  Dyspnea, Concern for effusion     Views Obtained & Images Saved for These Views:           The pericardial sac, myocardium, 4 chambers, and IVC were identified using the following

## 2023-10-10 NOTE — PLAN OF CARE
Problem: Safety - Adult  Goal: Free from fall injury  10/10/2023 1506 by Luis Armando Slade RN  Outcome: Progressing   Patient up in chair, chair alarm on for safety. Call light in reach. Patient watching TV at this time, denies complaints.

## 2023-10-10 NOTE — PROGRESS NOTES
Inpatient Physical Therapy Evaluation & Treatment    Unit: PCU  Date:  10/10/2023  Patient Name:    Tony Turner  Admitting diagnosis:  Acute respiratory failure ([address]) [J96.00]  Dyspnea and respiratory abnormalities [R06.00, R06.89]  Hypoxia [R09.02]  Pneumonia of right lung due to infectious organism, unspecified part of lung [J18.9]  Admit Date:  10/9/2023  Precautions/Restrictions/WB Status/ Lines/ Wounds/ Oxygen: Fall risk, Bed/chair alarm, and Lines (external catheter)  Pt seen for cotreatment this date due to patient safety, patient endurance, and limited functional status information    Treatment Time:  10:15-10:55  Treatment Number:  1   Timed Code Treatment Minutes: 30 minutes  Total Treatment Minutes:  40  minutes    Patient Stated Goals for Therapy: \" not stated \"          Discharge Recommendations: Home with 24hr supervision and Home PT  DME needs for discharge: Needs Met       Therapy recommendation for EMS Transport: can transport by wheelchair    Therapy recommendations for staff:   Stand by assist for ambulation with use of rolling walker (RW) and gait belt within room    History of Present Illness:   Per H&P Dr. Juliet Chand 10/10: \"69 y.o. male who presented to [facility] with past medical history of cancer status post radiation in the jaw presented to the ED due to chief complaint of shortness of breath     Patient reported shortness of progressively worsening throughout the day exacerbated on exertion no alleviating factors reported has been compliant with medications. No associated fever chills cough nonproduction abdominal pain or dysuria. Patient reports that he took a flu shot for his first time in his life and reported that his shortness of breath and hypoxia started since then. \"  Cardiology consult completed. Awaiting echo.      Home Health S4 Level Recommendation:  Level 1 Standard        AM-PAC Mobility Score       AM-PAC Inpatient Mobility without Stair Climbing Raw Score : 20      Subjective  Patient lying reclined in bed with  PCA in room . Pt agreeable to this PT session. Cognition    A&O x4   Able to follow 2 step commands    Pain   No  Location: N/A  Rating: NA /10  Pain Medicine Status: No request made    Preadmission Environment / Preadmission Status:  Below taken from recent Jada Nya PT evaluation, confirmed today:    Lives With: Alone (one sister on each side in their own trailers (neighbors), brother stays with pt sometimes)  Type of Home: Trailer  Home Layout: One level  Home Access: Ramped entrance  Bathroom Shower/Tub: Walk-in shower, Curtain, Shower chair with back  Bathroom Toilet: Standard  Bathroom Equipment: Grab bars in shower, Grab bars around toilet  Home Equipment: [address], [address], [address] (pt sleeps in flat bed. uses rollator inside, [address] outside, does not use WC.)  Has the patient had two or more falls in the past year or any fall with injury in the past year?: Yes (approximately 5 falls, fall brought pt to hospital, pt reports dizziness causes his falls, dizziness comes and goes without a trigger per his report.)  Receives Help From: Family (sister assist with cleaning)  ADL Assistance: Independent  Homemaking Assistance: Needs assistance (sister completes heavy house work, pt able to complete light housework)  Homemaking Responsibilities: Yes  Ambulation Assistance: Independent (walker)  Transfer Assistance: Independent  Active : No  Patient's  Info: sister  Occupation: Retired  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 Leisure & Hobbies: fish  Additional Comments: pt report sisters might be able to provide 24 hr care    Objective  Does this pt have an acute or acute on chronic diagnosis of CHF? No    Upper Extremity ROM/Strength  Please see OT evaluation. Lower Extremity ROM / Strength   AROM WFL: Yes    BLE strength WFL, but not formally assessed with MMT. Grossly at least 3+/5 bilat based on demonstrated functional mobility.     Lower

## 2023-10-10 NOTE — ED NOTES
Report called to nurse Brandon Pelaez for room 306. All questions answered at this time.      Nikolai Ca, RN  10/10/23 [address] [facility], [address] [place] [address] Herve, RN  10/10/23 9228

## 2023-10-10 NOTE — CONSULTS
Results   Component Value Date/Time    WBC 10.9 10/10/2023 04:54 AM    RBC 4.81 10/10/2023 04:54 AM    HGB 15.0 10/10/2023 04:54 AM    HCT 44.7 10/10/2023 04:54 AM    MCV 92.9 10/10/2023 04:54 AM    RDW 15.2 10/10/2023 04:54 AM     10/10/2023 04:54 AM     CMP:    Lab Results   Component Value Date/Time     10/10/2023 04:54 AM    K 4.0 10/10/2023 04:54 AM    CL 99 10/10/2023 04:54 AM    CO2 22 10/10/2023 04:54 AM    BUN 9 10/10/2023 04:54 AM    CREATININE 0.7 10/10/2023 04:54 AM    GFRAA >60 07/27/2021 07:52 AM    GFRAA >60 03/25/2010 09:40 PM    AGRATIO 1.8 10/10/2023 04:54 AM    LABGLOM >60 10/10/2023 04:54 AM    GLUCOSE 168 10/10/2023 04:54 AM    PROT 6.5 10/10/2023 04:54 AM    PROT 5.0 03/25/2010 09:40 PM    CALCIUM 8.7 10/10/2023 04:54 AM    BILITOT 0.5 10/10/2023 04:54 AM    ALKPHOS 66 10/10/2023 04:54 AM    AST 25 10/10/2023 04:54 AM    ALT 15 10/10/2023 04:54 AM     PT/INR:  No results found for: \"PTINR\"  Lab Results   Component Value Date    CKTOTAL 182 10/10/2023    TROPONINI <0.01 07/20/2021   HS Tn 40 38     EKG: 10.9.23   I have reviewed EKG with the following interpretation:  Impression:  Normal sinus rhythmLeft anterior fascicular blockModerate voltage criteria for LVH, may be normal variantST & T wave abnormality, consider lateral ischemia 99 bpm     Repeat EKG: Sinus tachycardia Left axis deviation Left ventricular hypertrophy with repolarization abnormality  124 bpm    CXR10.9.23  No acute Process    Chest CT10.9.23  IMPRESSION:  1. No evidence of pulmonary arterial embolism. 2.  Hypoinflated lungs with atelectatic changes and old granulomatous  disease. However some bronchitis and bronchiolitis are suspected with the  right lower lobe more than the left. 3.  Wall thickening of the thoracic esophagus with some gas and fluid in the  lumen. Correlate for reflux and esophagitis. ECHO 10/10/2023      ECHO 3/29/2010   - Left ventricle:  The cavity size was normal. Wall present.

## 2023-10-10 NOTE — PROGRESS NOTES
Patient remains up in chair watching TV. Patient denies complaints at this time. Call light in reach.

## 2023-10-10 NOTE — FLOWSHEET NOTE
10/10/23 1911   Handoff   Communication Given Shift Handoff   Handoff Given To [facility] Received From [facility] Communication Face to Face; At bedside   Time Handoff Given 1912   End of Shift Check Performed Yes

## 2023-10-10 NOTE — PROGRESS NOTES
Supine to Sit:    Supervision  Sit to Supine:   Not Tested  Rolling:   Not Tested  Scooting in sitting: Supervision to scoot to EOB and to scoot back fully in recliner  Scooting in supine:  Not Tested    Transfers:    Sit to stand:    SBA (cues for initial hand placement)  Stand to sit:    SBA   Bed to chair:     SBA and With RW and gait belt  Bed/ chair to standard toilet:  Not Tested  Bed/chair to [facility]:   Not Tested  Functional Mobility:   SBA and With RW and gait belt within room    ADLs:  Dressing:      UE:   Not Tested  LE:    Not Tested    Bathing:    UE:  Not Tested  LE:  Not Tested    Eating:   Not Tested    Toileting:  Not Tested (pt had just been cleaned up prior to evaluation session; per PCA report, pt had soiled clothing but was unaware and needed assistance to don clean gown. Pt now utilizing [address].)    Grooming/hygiene: Supervision to wipe face, hands     Activity Tolerance:   Pt completed therapy session with dizziness/lightheadedness     BP (mmHg) HR (bpm) SpO2 (%) on RA Comments   Supine at rest 157/93 (at 8:30am, 2 hr prior to therapy). Seated at /81 89     Standing 110/78 86     End of session         Positioning Needs:   Pt reclined in chair, alarm set, call light provided and all needs within reach . Ther Ex / Activities Initiated:   N/A    Patient/Family Education:   Pt educated on role of inpatient OT, plan of care, importance of continued activity, DC recommendations and Calling for assist with mobility. CHF Education  N/A    Assessment:  Pt seen for Occupational therapy evaluation in acute care setting. Pt demonstrated decreased Activity tolerance, ADLs, Balance , Bathing, Bed mobility, Dressing, and Transfers. Pt functioning below baseline and will likely benefit from skilled occupational therapy services to maximize safety and independence.      Recommending Home 24 hr supervision and with home OT upon discharge as patient functioning close to baseline level    Goal(s) : To be met in 3 Visits:  Bed to toilet/BSC:       Independent    To be met in 5 Visits:  Supine to/from Sit in preparation for ADL task: Independent  Toileting        Independent  Grooming       Independent  Upper Body Dressing:      Independent  Lower Body Dressing:      Independent  Pt to demonstrate UE therapeutic exs x 15 reps with minimal cues    Rehabilitation Potential: Good  Strengths for achieving goals include: PLOF and Pt cooperative   Barriers to achieving goals include:  No Barriers    Plan: To be seen 2-3 x/wk  while in acute care setting for therapeutic exercises, bed mobility, transfers, family/patient education, ADL/IADL retraining, and energy conservation training.     Electronically signed by Lanette Rollins OT on 10/10/2023 at 12:07 PM      If patient discharges from this facility prior to next visit, this note will serve as the Discharge Summary

## 2023-10-11 VITALS
SYSTOLIC BLOOD PRESSURE: 141 MMHG | WEIGHT: 150.2 LBS | BODY MASS INDEX: 21.5 KG/M2 | DIASTOLIC BLOOD PRESSURE: 72 MMHG | OXYGEN SATURATION: 98 % | RESPIRATION RATE: 16 BRPM | HEIGHT: 70 IN | TEMPERATURE: 97.6 F | HEART RATE: 81 BPM

## 2023-10-11 LAB
BASOPHILS # BLD: 0 K/UL (ref 0–0.2)
BASOPHILS NFR BLD: 0.1 %
DEPRECATED RDW RBC AUTO: 15.2 % (ref 12.4–15.4)
EOSINOPHIL # BLD: 0.1 K/UL (ref 0–0.6)
EOSINOPHIL NFR BLD: 1.5 %
HCT VFR BLD AUTO: 42.3 % (ref 40.5–52.5)
HGB BLD-MCNC: 14.2 G/DL (ref 13.5–17.5)
LYMPHOCYTES # BLD: 1.1 K/UL (ref 1–5.1)
LYMPHOCYTES NFR BLD: 10.8 %
MCH RBC QN AUTO: 31.4 PG (ref 26–34)
MCHC RBC AUTO-ENTMCNC: 33.6 G/DL (ref 31–36)
MCV RBC AUTO: 93.6 FL (ref 80–100)
MONOCYTES # BLD: 0.7 K/UL (ref 0–1.3)
MONOCYTES NFR BLD: 7 %
NEUTROPHILS # BLD: 7.9 K/UL (ref 1.7–7.7)
NEUTROPHILS NFR BLD: 80.6 %
PLATELET # BLD AUTO: 177 K/UL (ref 135–450)
PMV BLD AUTO: 7.3 FL (ref 5–10.5)
RBC # BLD AUTO: 4.51 M/UL (ref 4.2–5.9)
WBC # BLD AUTO: 9.8 K/UL (ref 4–11)

## 2023-10-11 PROCEDURE — 99232 SBSQ HOSP IP/OBS MODERATE 35: CPT | Performed by: INTERNAL MEDICINE

## 2023-10-11 PROCEDURE — 6360000002 HC RX W HCPCS: Performed by: NURSE PRACTITIONER

## 2023-10-11 PROCEDURE — 36415 COLL VENOUS BLD VENIPUNCTURE: CPT

## 2023-10-11 PROCEDURE — 85025 COMPLETE CBC W/AUTO DIFF WBC: CPT

## 2023-10-11 PROCEDURE — 2580000003 HC RX 258: Performed by: NURSE PRACTITIONER

## 2023-10-11 PROCEDURE — 99239 HOSP IP/OBS DSCHRG MGMT >30: CPT | Performed by: INTERNAL MEDICINE

## 2023-10-11 PROCEDURE — 6370000000 HC RX 637 (ALT 250 FOR IP): Performed by: INTERNAL MEDICINE

## 2023-10-11 PROCEDURE — 6360000002 HC RX W HCPCS: Performed by: INTERNAL MEDICINE

## 2023-10-11 RX ORDER — AMLODIPINE BESYLATE 5 MG/1
5 TABLET ORAL DAILY
Qty: 30 TABLET | Refills: 3 | Status: SHIPPED | OUTPATIENT
Start: 2023-10-12

## 2023-10-11 RX ORDER — AZITHROMYCIN 500 MG/1
500 TABLET, FILM COATED ORAL DAILY
Qty: 1 PACKET | Refills: 0 | Status: SHIPPED | OUTPATIENT
Start: 2023-10-11 | End: 2023-10-14

## 2023-10-11 RX ADMIN — AMPICILLIN AND SULBACTAM 3000 MG: 2; 1 INJECTION, POWDER, FOR SOLUTION INTRAVENOUS at 11:03

## 2023-10-11 RX ADMIN — ENOXAPARIN SODIUM 40 MG: 100 INJECTION SUBCUTANEOUS at 08:51

## 2023-10-11 RX ADMIN — AMPICILLIN AND SULBACTAM 3000 MG: 2; 1 INJECTION, POWDER, FOR SOLUTION INTRAVENOUS at 06:16

## 2023-10-11 RX ADMIN — AMLODIPINE BESYLATE 5 MG: 5 TABLET ORAL at 08:51

## 2023-10-11 RX ADMIN — METOPROLOL TARTRATE 25 MG: 25 TABLET, FILM COATED ORAL at 08:51

## 2023-10-11 NOTE — PLAN OF CARE
Problem: Discharge Planning  Goal: Discharge to home or other facility with appropriate resources  10/11/2023 1002 by Natan Marinelli RN  Outcome: Progressing  10/10/2023 2029 by Roosevelt Mccrary RN  Outcome: Progressing     Problem: Safety - Adult  Goal: Free from fall injury  10/11/2023 1002 by Natan Marinelli RN  Outcome: Progressing  10/10/2023 2029 by Roosevelt Mccrary RN  Outcome: Progressing

## 2023-10-11 NOTE — PROGRESS NOTES
10/11/23 1411   Encounter Summary   Encounter Overview/Reason  Initial Encounter   Service Provided For: Patient   Referral/Consult From: Rounding   Support System Unknown   Last Encounter  10/11/23  ([facility] visited; provided pastoral support and prayer)   Assessment/Intervention/Outcome   Assessment Coping; Hopeful   Intervention Prayer (assurance of)/[place]   Outcome Encouraged

## 2023-10-11 NOTE — PROGRESS NOTES
IV removed and discharge instructions completed. All questions were answered to patients satisfaction. All personal belongs packed, waiting for ride to arrive. No further needs noted.

## 2023-10-11 NOTE — PLAN OF CARE
HEART FAILURE CARE PLAN:    Comorbidities Reviewed: Yes   Patient has a past medical history of Cancer ([address]), Radiation, and S/P chemotherapy, time since greater than 12 weeks. ECHOCARDIOGRAM Reviewed: Yes   Patient's Ejection Fraction (EF) is greater than 40%    Weights Reviewed: Yes   Admission weight: 160 lb (72.6 kg)   Wt Readings from Last 3 Encounters:   10/10/23 150 lb 12.8 oz (68.4 kg)   05/29/23 172 lb (78 kg)   07/27/21 160 lb (72.6 kg)     Intake & Output Reviewed: Yes     Intake/Output Summary (Last 24 hours) at 10/10/2023 2031  Last data filed at 10/10/2023 1805  Gross per 24 hour   Intake 0 ml   Output 100 ml   Net -100 ml     Medications Reviewed: Yes   SCHEDULED HOSPITAL MEDICATIONS:   sodium chloride flush  10 mL IntraVENous 2 times per day    enoxaparin  40 mg SubCUTAneous Daily    amLODIPine  5 mg Oral Daily    metoprolol tartrate  25 mg Oral BID    ampicillin-sulbactam  3,000 mg IntraVENous Q6H     ACE/ARB/ARNI is REQUIRED for EF </= 48% SYSTOLIC FAILURE:   ACE[de-identified] None  ARB[de-identified] None  ARNI[de-identified] None    Evidenced-Based Beta Blocker is REQUIRED for EF </= 17% SYSTOLIC FAILURE:   [de-identified] None    Diuretics:  [de-identified] None    Diet Reviewed: Yes   ADULT DIET; Dysphagia - Soft and Bite Sized    Goal of Care Reviewed: Yes   Patient and/or Family's stated Goal of Care this Admission: reduce shortness of breath, increase activity tolerance, better understand heart failure and disease management, be more comfortable, and reduce lower extremity edema prior to discharge.          Problem: Discharge Planning  Goal: Discharge to home or other facility with appropriate resources  Outcome: Progressing     Problem: Safety - Adult  Goal: Free from fall injury  10/10/2023 2029 by Tr Canela RN  Outcome: Progressing  10/10/2023 1506 by Oneil Hernandez RN  Outcome: Progressing

## 2023-10-11 NOTE — PLAN OF CARE
Problem: Discharge Planning  Goal: Discharge to home or other facility with appropriate resources  10/11/2023 1628 by Anastacio Madrid RN  Outcome: Adequate for Discharge  10/11/2023 1002 by Anastacio Madrid RN  Outcome: Progressing     Problem: Safety - Adult  Goal: Free from fall injury  10/11/2023 1628 by Anastacio Madrid RN  Outcome: Adequate for Discharge  10/11/2023 1002 by Anastacio Madrid RN  Outcome: Progressing

## 2023-10-11 NOTE — PROGRESS NOTES
Shift assessment complete. See flow sheet. Scheduled meds given. See MAR. Patients head-toe complete, VS are logged, and active bowel sound noted in all four quadrants. Pt sitting up in bed respirations easy and unlabored. No s/s of distress. Alert and oriented no pain or SOB. No further needs voiced at this time. Call light and bedside table are within reach. The bed is locked and is in the lowest position.

## 2023-10-11 NOTE — PROGRESS NOTES
Having brief episodes where oxygen is dropping to 60's and 70's. Went to check on patient during these times and seemed patient was having sleep apnea episodes. Placed on 1 L oxygen via nasal canula.

## 2023-10-11 NOTE — DISCHARGE SUMMARY
Name:  Amber Santana  Room:  /2128-93  MRN:    [id number]    Discharge Summary      This discharge summary is in conjunction with a complete physical exam done on the day of discharge. Discharging Physician: Dr. Rios How: 10/9/2023  Discharge:  10/11/23      HPI taken from admission H&P:      68 y.o. 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 No associated fever chills cough nonproduction abdominal pain or dysuria. Patient reports that he took a flu shot for his first time in his life and reported that his shortness of breath and hypoxia started since then.      Diagnoses this Admission and Hospital Course     #HEBRET  # Hypoxia   # Acute bronchitis   - CTPE without acute PE, suspected bronchitis and bronchiolitis RLL  - O2 sats 69% on RA per ED notes, no home O2 at baseline   - tachypneic, dyspnea   - placed supp O2, wean as tolerated, was placed on 2 L NC -> now weaned to room air  -  Blood & Resp cx no growth to date  - Echo  -> EF normal  - continue Unasyn   - Cardiology consulted -cardiac work-up negative.  -Discharged on PO Zithromax for bronchitis     #Syncopal episode-  per patient   #Elevated troponin   - 40 -> 38 -> repeat pending   - denies CP  - EKG , lateral wall ischemia with LVH  - patient states he was in his chair watching TV where he then woke up 35-45 min later, unsure this was actual syncope vs falling asleep  - Echo EF wnl, TSH normal   - plan  to discharge with cardiac event monitor in place     #Lactic acidosis  - resolved  with IVF  -   - continue Unasyn -> discharge on PO Zithromax     #HTN  - continue norvasc, lopressor  - elevated this am but orders were not release by AM    BUN 9 10/10/2023 04:54 AM    CREATININE 0.7 10/10/2023 04:54 AM    GLUCOSE 168 10/10/2023 04:54 AM    CALCIUM 8.7 10/10/2023 04:54 AM    LABGLOM >60 10/10/2023 04:54 AM      Lab Results   Component Value Date/Time    ALKPHOS 66 10/10/2023 04:54 AM    ALT 15 10/10/2023 04:54 AM    AST 25 10/10/2023 04:54 AM    PROT 6.5 10/10/2023 04:54 AM    PROT 5.0 03/25/2010 09:40 PM    BILITOT 0.5 10/10/2023 04:54 AM    LABALBU 4.2 10/10/2023 04:54 AM       CULTURES  Results       Procedure Component Value Units Date/Time    Culture, Respiratory [[id number]]     Order Status: No result Specimen: Sputum Expectorated     Blood Culture 2 [[id number]] Collected: 10/09/23 2358    Order Status: Completed Specimen: Blood Updated: 10/11/23 0215     Culture, Blood 2 No Growth to date. Any change in status will be called. Narrative:      ORDER#: [id number]                          ORDERED BY: Kary Resendiz  SOURCE: Blood Antecubital-Lef              COLLECTED:  10/09/23 23:58  ANTIBIOTICS AT ISABELL.:                      RECEIVED :  10/10/23 00:08  If child <=2 yrs old please draw pediatric bottle. ~Blood Culture #2    Blood Culture 1 [[id number]] Collected: 10/09/23 2357    Order Status: Completed Specimen: Blood Updated: 10/11/23 0215     Blood Culture, Routine No Growth to date. Any change in status will be called. Narrative:      ORDER#: [id number]                          ORDERED BY: Kary Resendiz  SOURCE: Blood                              COLLECTED:  10/09/23 23:57  ANTIBIOTICS AT ISABELL.:                      RECEIVED :  10/10/23 00:07  If child <=2 yrs old please draw pediatric bottle. ~Blood Culture 1    COVID-19 & Influenza Combo [[id number]] Collected: 10/09/23 2059    Order Status: Completed Specimen: Nasopharyngeal Swab Updated: 10/09/23 2142     SARS-CoV-2 RNA, RT PCR NOT DETECTED     Comment: Not Detected results do not preclude SARS-CoV-2 infection and  should not be used as the sole basis for patient ventricular systolic function is normal with ejection fraction   estimated at 55%. No regional wall motion abnormalities. There is mild concentric left ventricular hypertrophy. Elevated left ventricular filling pressure. No significant valvular heart disease. Discharge Medications     Medication List        START taking these medications      azithromycin 500 MG tablet  Commonly known as: Zithromax  Take 1 tablet by mouth daily for 3 days            CHANGE how you take these medications      amLODIPine 5 MG tablet  Commonly known as: NORVASC  Take 1 tablet by mouth daily  Start taking on: October 12, 2023  What changed: how much to take     metoprolol tartrate 25 MG tablet  Commonly known as: LOPRESSOR  Take 1 tablet by mouth 2 times daily  What changed: medication strength            STOP taking these medications      butalbital-acetaminophen-caffeine -40 MG per tablet  Commonly known as: FIORICET, ESGIC     melatonin 3 MG Tabs tablet               Where to Get Your Medications        These medications [facility] [place] [id number]      Phone: [phone]   amLODIPine 5 MG tablet  azithromycin 500 MG tablet  metoprolol tartrate 25 MG tablet           Discharged in stable condition to home    Follow Up:   Follow up with PCP in 1 week, follow up with Cardiology

## 2023-10-11 NOTE — FLOWSHEET NOTE
10/11/23 0720   Handoff   Communication Given Shift Handoff   Handoff Given To RHEA Brian   Handoff Received From Anastasia Severs, RN   Handoff Communication Face to Face   Time Handoff Given 0720   End of Shift Check Performed Yes     Report given to RHEA Brian. Pt in bed resting. Call light within reach, pt is stable. Care is transferred.

## 2023-10-11 NOTE — ACP (ADVANCE CARE PLANNING)
Advance Care Planning     General Advance Care Planning (ACP) Conversation    Date of Conversation: 10/9/2023  Conducted with: Patient with Decision Making Capacity    Healthcare Decision Maker:    Primary Decision Maker: Bhavesh Eddie - Brother/Sister - [phone]    Secondary Decision Maker: Mark Brar - Brother/Sister - [phone]  Click here to complete Healthcare Decision Makers including selection of the Healthcare Decision Maker Relationship (ie \"Primary\"). Today we documented Decision Maker(s) consistent with Legal Next of Kin hierarchy.     Content/Action Overview:  DECLINED ACP Conversation - will revisit periodically  Reviewed DNR/DNI and patient elects Full Code (Attempt Resuscitation)        Length of Voluntary ACP Conversation in minutes:  <16 minutes (Non-Billable)    Adria Fernandez, RN

## 2023-10-11 NOTE — CARE COORDINATION
Case Management Assessment  Initial Evaluation    Date/Time of Evaluation: 10/11/2023 3:33 PM  Assessment Completed by: Willis Ayala RN    If patient is discharged prior to next notation, then this note serves as note for discharge by case management. Patient Name: Queen Odilia                   YOB: 1950  Diagnosis: Acute respiratory failure ([address]) [J96.00]  Dyspnea and respiratory abnormalities [R06.00, R06.89]  Hypoxia [R09.02]  Pneumonia of right lung due to infectious organism, unspecified part of lung [J18.9]                   Date / Time: 10/9/2023  8:39 PM    Patient Admission Status: Inpatient   Readmission Risk (Low < 19, Mod (19-27), High > 27): Readmission Risk Score: 9.9    Current PCP: No primary care provider on file. PCP verified by CM? No (pt is unsure. He will ask his sister Martin Hernandez)    Chart Reviewed: Yes      History Provided by: Patient  Patient Orientation: Alert and Oriented    Patient Cognition: Alert    Hospitalization in the last 30 days (Readmission):  No    If yes, Readmission Assessment in CM Navigator will be completed. Advance Directives:      Code Status: Full Code   Patient's Primary Decision Maker is: Legal Next of Kin    Primary Decision Maker: Edgard Romano - Brother/Sister - [phone]    Secondary Decision Maker: Lyubov Chaudhari - Brother/Sister - [phone]    Discharge Planning:    Patient lives with: Family Members Type of Home: Trailer/Mobile Home  Primary Care Giver: Self  Patient Support Systems include: Family Members   Current Financial resources: Medicare  Current community resources: None  Current services prior to admission: None            Current DME:              Type of Home Care services:  PT, OT    ADLS  Prior functional level: Independent in ADLs/IADLs  Current functional level: Independent in ADLs/IADLs    PT AM-PAC:   /24  OT AM-PAC: 16 /24    Family can provide assistance at DC:  Yes  Would you like Case Management to discuss the individualized plan of care/goals and shares the quality data associated with the providers was provided to:     Patient Representative Name:       The Patient and/or Patient Representative Agree with the Discharge Plan?       Holly King RN  Case Management Department  Ph: [phone] Fax: [phone]

## 2023-10-12 LAB — REPORT: NORMAL

## 2023-10-14 LAB
BACTERIA BLD CULT ORG #2: ABNORMAL
BACTERIA BLD CULT ORG #2: ABNORMAL
BACTERIA BLD CULT: ABNORMAL
ORGANISM: ABNORMAL

## 2023-10-16 PROBLEM — R06.89 DYSPNEA AND RESPIRATORY ABNORMALITIES: Status: ACTIVE | Noted: 2023-10-16

## 2023-10-16 PROBLEM — I10 HYPERTENSION: Status: ACTIVE | Noted: 2023-10-16

## 2023-10-16 PROBLEM — J20.9 ACUTE BRONCHITIS: Status: ACTIVE | Noted: 2023-10-16

## 2023-10-16 PROBLEM — R09.02 HYPOXIA: Status: ACTIVE | Noted: 2023-10-16

## 2023-10-16 PROBLEM — R06.00 DYSPNEA AND RESPIRATORY ABNORMALITIES: Status: ACTIVE | Noted: 2023-10-16

## 2023-10-16 NOTE — PROGRESS NOTES
Physician Progress Note      PATIENT:               Dacia Cardenas  [facility] #:                  [id number]  :                       1950  ADMIT DATE:       10/9/2023 8:39 PM  [address] [facility] DATE:        10/11/2023 5:42 PM  RESPONDING  PROVIDER #:        Ilya Díaz MD          QUERY TEXT:    Patient with documentation of sepsis likely pulmonary source. The wbc ranged   from 7.9-10.9-9.8, no bands, and no fever or hypothermia. In order to support   the diagnosis of sepsis, please include additional clinical indicators in   your documentation. Or please document if the diagnosis of sepsis has been   ruled out after further study    The medical record reflects the following:  Risk Factors: advanced age,  CTPE without acute PE, suspected bronchitis and   bronchiolitis RLL  Clinical Indicators: wbc-7.9/10.9/9.8, no bands, LA-3.2, pct-0.08, temp-97.4,   +tachycardia  Treatment: lactic, pct, blood cultures, Unasyn, Zithromax at discharge,   supportive care    Thank you,  Flaco Mcmanus RN,BSN,CCDS,CRCR  Options provided:  -- Sepsis due to bronchitis and bronchiolitis present as evidenced by, Please   document evidence. -- Sepsis was ruled out after study  -- Other - I will add my own diagnosis  -- Disagree - Not applicable / Not valid  -- Disagree - Clinically unable to determine / Unknown  -- Refer to Clinical Documentation Reviewer    PROVIDER RESPONSE TEXT:    Sepsis was ruled out after study. Query created by: Flaco Mcmanus on 10/12/2023 12:33 PM      QUERY TEXT:    Patient with documentation of acute respiratory failure. Patient with O2 sats   69% on RA per EMS, no home O2 at baseline  - tachypneic, dyspnea. The ED assessment states, \"Effort: Pulmonary effort is   normal. No respiratory distress\". Patient briefly required 2L and weaned down   to room air. In order to support the diagnosis of acute respiratory failure,   please include additional clinical indicators in your documentation.   Or   please document

## 2023-10-23 NOTE — PROGRESS NOTES
Physician Progress Note      PATIENT:               [facility] #:                  [id number]  :                       1950  ADMIT DATE:       10/9/2023 8:39 PM  [address] [facility] DATE:        10/11/2023 5:42 PM  RESPONDING  PROVIDER #:        Vivi De La Garza MD          QUERY TEXT:    This patient presented with hypoxia and shortness of breath and was diagnosed   with acute bronchitis. ? The patient had troponins 40, 38, 24.? The patient was   treated with IV antibiotics. ? Can you further specify the patient's condition   as: The medical record reflects the following:  Risk Factors: advanced age, hypoxia, acute bronchitis, HTN  Clinical Indicators: Elevated troponin - 40 -> 38 -> 24, - denies CP - EKG ,   lateral wall ischemia with LVH  Treatment: trend troponins, ekg, tele, echo, dc with cardiac event monitor,   supportive care    Thank you,  Vilma Liang RN,BSN,CCDS,CRCR  Options provided:  -- Demand ischemia due to acute bronchitis  -- Elevated troponins only  -- Other - I will add my own diagnosis  -- Disagree - Not applicable / Not valid  -- Disagree - Clinically unable to determine / Unknown  -- Refer to Clinical Documentation Reviewer    PROVIDER RESPONSE TEXT:    The patient with elevated troponins only.     Query created by: Zacarias Ellis on 10/18/2023 5:37 AM      Electronically signed by:  Vivi De La Garza MD 10/23/2023 10:06 AM

## 2024-04-16 ENCOUNTER — HOSPITAL ENCOUNTER (EMERGENCY)
Age: 74
Discharge: HOME | End: 2024-04-16
Attending: STUDENT IN AN ORGANIZED HEALTH CARE EDUCATION/TRAINING PROGRAM
Payer: COMMERCIAL

## 2024-04-16 VITALS
BODY MASS INDEX: 24.91 KG/M2 | HEART RATE: 82 BPM | RESPIRATION RATE: 18 BRPM | WEIGHT: 174 LBS | HEIGHT: 70 IN | SYSTOLIC BLOOD PRESSURE: 168 MMHG | DIASTOLIC BLOOD PRESSURE: 78 MMHG | TEMPERATURE: 97.5 F | OXYGEN SATURATION: 99 %

## 2024-04-16 DIAGNOSIS — R42 LIGHTHEADEDNESS: Primary | ICD-10-CM

## 2024-04-16 DIAGNOSIS — R79.89 ELEVATED TROPONIN: ICD-10-CM

## 2024-04-16 LAB
ALBUMIN SERPL-MCNC: 4.1 G/DL (ref 3.4–5)
ALBUMIN/GLOB SERPL: 1.7 {RATIO} (ref 1.1–2.2)
ALP SERPL-CCNC: 76 U/L (ref 40–129)
ALT SERPL-CCNC: 21 U/L (ref 10–40)
ANION GAP SERPL CALCULATED.3IONS-SCNC: 15 MMOL/L (ref 3–16)
AST SERPL-CCNC: 42 U/L (ref 15–37)
BASOPHILS # BLD: 0.1 K/UL (ref 0–0.2)
BASOPHILS NFR BLD: 0.8 %
BILIRUB SERPL-MCNC: 0.3 MG/DL (ref 0–1)
BUN SERPL-MCNC: 8 MG/DL (ref 7–20)
CALCIUM SERPL-MCNC: 9.4 MG/DL (ref 8.3–10.6)
CHLORIDE SERPL-SCNC: 95 MMOL/L (ref 99–110)
CO2 SERPL-SCNC: 22 MMOL/L (ref 21–32)
CREAT SERPL-MCNC: 0.7 MG/DL (ref 0.8–1.3)
DEPRECATED RDW RBC AUTO: 13.6 % (ref 12.4–15.4)
EOSINOPHIL # BLD: 0.3 K/UL (ref 0–0.6)
EOSINOPHIL NFR BLD: 5 %
GFR SERPLBLD CREATININE-BSD FMLA CKD-EPI: >90 ML/MIN/{1.73_M2}
GLUCOSE SERPL-MCNC: 97 MG/DL (ref 70–99)
HCT VFR BLD AUTO: 40 % (ref 40.5–52.5)
HGB BLD-MCNC: 13.5 G/DL (ref 13.5–17.5)
LYMPHOCYTES # BLD: 1.7 K/UL (ref 1–5.1)
LYMPHOCYTES NFR BLD: 25.2 %
MCH RBC QN AUTO: 32 PG (ref 26–34)
MCHC RBC AUTO-ENTMCNC: 33.7 G/DL (ref 31–36)
MCV RBC AUTO: 94.9 FL (ref 80–100)
MONOCYTES # BLD: 0.6 K/UL (ref 0–1.3)
MONOCYTES NFR BLD: 9.4 %
NEUTROPHILS # BLD: 4.1 K/UL (ref 1.7–7.7)
NEUTROPHILS NFR BLD: 59.6 %
NT-PROBNP SERPL-MCNC: 331 PG/ML (ref 0–124)
PLATELET # BLD AUTO: 206 K/UL (ref 135–450)
PMV BLD AUTO: 6.7 FL (ref 5–10.5)
POTASSIUM SERPL-SCNC: 4 MMOL/L (ref 3.5–5.1)
PROT SERPL-MCNC: 6.5 G/DL (ref 6.4–8.2)
RBC # BLD AUTO: 4.22 M/UL (ref 4.2–5.9)
SODIUM SERPL-SCNC: 132 MMOL/L (ref 136–145)
TROPONIN, HIGH SENSITIVITY: 25 NG/L (ref 0–22)
TROPONIN, HIGH SENSITIVITY: 32 NG/L (ref 0–22)
WBC # BLD AUTO: 6.9 K/UL (ref 4–11)

## 2024-04-16 PROCEDURE — 36415 COLL VENOUS BLD VENIPUNCTURE: CPT

## 2024-04-16 PROCEDURE — 93005 ELECTROCARDIOGRAM TRACING: CPT | Performed by: STUDENT IN AN ORGANIZED HEALTH CARE EDUCATION/TRAINING PROGRAM

## 2024-04-16 PROCEDURE — 80053 COMPREHEN METABOLIC PANEL: CPT

## 2024-04-16 PROCEDURE — 99284 EMERGENCY DEPT VISIT MOD MDM: CPT

## 2024-04-16 PROCEDURE — 83880 ASSAY OF NATRIURETIC PEPTIDE: CPT

## 2024-04-16 PROCEDURE — 84484 ASSAY OF TROPONIN QUANT: CPT

## 2024-04-16 PROCEDURE — 2580000003 HC RX 258: Performed by: STUDENT IN AN ORGANIZED HEALTH CARE EDUCATION/TRAINING PROGRAM

## 2024-04-16 PROCEDURE — 85025 COMPLETE CBC W/AUTO DIFF WBC: CPT

## 2024-04-16 RX ORDER — 0.9 % SODIUM CHLORIDE 0.9 %
1000 INTRAVENOUS SOLUTION INTRAVENOUS ONCE
Status: COMPLETED | OUTPATIENT
Start: 2024-04-16 | End: 2024-04-16

## 2024-04-16 RX ADMIN — SODIUM CHLORIDE 1000 ML: 9 INJECTION, SOLUTION INTRAVENOUS at 21:32

## 2024-04-16 ASSESSMENT — PAIN - FUNCTIONAL ASSESSMENT: PAIN_FUNCTIONAL_ASSESSMENT: NONE - DENIES PAIN

## 2024-04-16 ASSESSMENT — HEART SCORE: ECG: NORMAL

## 2024-04-17 LAB
EKG ATRIAL RATE: 85 BPM
EKG DIAGNOSIS: NORMAL
EKG P AXIS: 54 DEGREES
EKG P-R INTERVAL: 168 MS
EKG Q-T INTERVAL: 396 MS
EKG QRS DURATION: 108 MS
EKG QTC CALCULATION (BAZETT): 471 MS
EKG R AXIS: -49 DEGREES
EKG T AXIS: 79 DEGREES
EKG VENTRICULAR RATE: 85 BPM

## 2024-04-17 PROCEDURE — 93010 ELECTROCARDIOGRAM REPORT: CPT | Performed by: INTERNAL MEDICINE

## 2024-04-17 NOTE — ED PROVIDER NOTES
[facility] ED  EMERGENCY DEPARTMENT ENCOUNTER        Patient Name: Jerome Maria  MRN: [id number]  Birthdate 1950  Date of evaluation: 4/16/2024  Provider: Paola Lynch MD  PCP: No primary care provider on file.  Note Started: 8:43 PM EDT 4/16/24      CHIEF COMPLAINT  Lightheadedness         HISTORY & PHYSICAL     HISTORY OF PRESENT ILLNESS  History from : Patient    Limitations to history : None    Jerome Maria is a 73 y.o. male  has a past medical history of Cancer (HCC), Radiation, and S/P chemotherapy, time since greater than 12 weeks., who presents to the ED complaining of lightheadedness. No syncope. Sitting in the heat of his house at the time of onset. Has improved since being in ED.    Chest pain: denies  Shortness of breath: denies    Denies history of blood clots or active malignancy.  Patient denies unilateral leg swelling, hemoptysis, recent travel or surgery/immobilization, or OCP or other hormone use. Patient is not post partum.    Patient denies vertigo, numbness, weakness, tingling.    Family history:   History reviewed. No pertinent family history.    They report that their most recent cardiac evaluation was:     Patient does not smoke. Patient denies cocaine or other drug use.    Old records reviewed:   Echo 10/2023  Summary   Left ventricular systolic function is normal with ejection fraction   estimated at 55%.   No regional wall motion abnormalities.   There is mild concentric left ventricular hypertrophy.   Elevated left ventricular filling pressure.   No significant valvular heart disease.    No other complaints, modifying factors or associated symptoms.  Nursing Notes were all reviewed and agreed with or any disagreements were addressed in the HPI.    I have reviewed the following from the nursing documentation.    Past Medical History:   Diagnosis Date    Cancer (HCC)     oral sqaumous cell carcinoma 2010    Radiation     S/P chemotherapy, time since greater

## 2024-04-17 NOTE — DISCHARGE INSTRUCTIONS
Your workup was overall reassuring, however your heart enzymes were mildly elevated, similar to previous admission.  You are not having any chest pain or shortness of breath.  We did offer admission but you did not wish to be admitted at this time.  Please follow-up with cardiology.  Please return the emergency department if you do have any new or worsening symptoms including chest pain or shortness of breath.  We also recommend that you open of the windows of your house this evening to let the heat out and find a cool place to be during hot days

## 2025-01-11 ENCOUNTER — APPOINTMENT (OUTPATIENT)
Dept: GENERAL RADIOLOGY | Age: 75
DRG: 872 | End: 2025-01-11
Payer: MEDICARE

## 2025-01-11 ENCOUNTER — APPOINTMENT (OUTPATIENT)
Dept: CT IMAGING | Age: 75
DRG: 872 | End: 2025-01-11
Payer: MEDICARE

## 2025-01-11 ENCOUNTER — HOSPITAL ENCOUNTER (INPATIENT)
Age: 75
LOS: 9 days | Discharge: NURSING, REHAB OR LONG-TERM CARE | DRG: 872 | End: 2025-01-20
Attending: EMERGENCY MEDICINE | Admitting: INTERNAL MEDICINE
Payer: MEDICARE

## 2025-01-11 DIAGNOSIS — R78.81 BACTEREMIA DUE TO GROUP B STREPTOCOCCUS: ICD-10-CM

## 2025-01-11 DIAGNOSIS — E87.6 HYPOKALEMIA: ICD-10-CM

## 2025-01-11 DIAGNOSIS — R50.9 FEVER, UNSPECIFIED FEVER CAUSE: ICD-10-CM

## 2025-01-11 DIAGNOSIS — Z85.819 HISTORY OF ORAL CANCER: ICD-10-CM

## 2025-01-11 DIAGNOSIS — D72.829 LEUKOCYTOSIS, UNSPECIFIED TYPE: ICD-10-CM
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
## 2025-01-11 DIAGNOSIS — R79.89 PRERENAL AZOTEMIA: ICD-10-CM

## 2025-01-11 DIAGNOSIS — B95.1 BACTEREMIA DUE TO GROUP B STREPTOCOCCUS: ICD-10-CM

## 2025-01-11 DIAGNOSIS — R65.10 SIRS (SYSTEMIC INFLAMMATORY RESPONSE SYNDROME) (HCC): Primary | ICD-10-CM

## 2025-01-11 DIAGNOSIS — R13.10 DYSPHAGIA, UNSPECIFIED TYPE: ICD-10-CM

## 2025-01-11 LAB
ALBUMIN SERPL-MCNC: 4 G/DL (ref 3.4–5)
ALBUMIN/GLOB SERPL: 1.2 {RATIO} (ref 1.1–2.2)
ALP SERPL-CCNC: 78 U/L (ref 40–129)
ALT SERPL-CCNC: 17 U/L (ref 10–40)
ANION GAP SERPL CALCULATED.3IONS-SCNC: 14 MMOL/L (ref 3–16)
AST SERPL-CCNC: 26 U/L (ref 15–37)
BASE EXCESS BLDV CALC-SCNC: 1.5 MMOL/L (ref -3–3)
BASOPHILS # BLD: 0 K/UL (ref 0–0.2)
BASOPHILS NFR BLD: 0.1 %
BILIRUB SERPL-MCNC: 1.4 MG/DL (ref 0–1)
BILIRUB UR QL STRIP.AUTO: NEGATIVE
BUN SERPL-MCNC: 29 MG/DL (ref 7–20)
CALCIUM SERPL-MCNC: 10.1 MG/DL (ref 8.3–10.6)
CHLORIDE SERPL-SCNC: 100 MMOL/L (ref 99–110)
CLARITY UR: CLEAR
CO2 BLDV-SCNC: 26 MMOL/L
CO2 SERPL-SCNC: 26 MMOL/L (ref 21–32)
COHGB MFR BLDV: 3.2 % (ref 0–1.5)
COLOR UR: YELLOW
CREAT SERPL-MCNC: 0.8 MG/DL (ref 0.8–1.3)
DEPRECATED RDW RBC AUTO: 13.9 % (ref 12.4–15.4)
EKG ATRIAL RATE: 123 BPM
EKG DIAGNOSIS: NORMAL
EKG P-R INTERVAL: 150 MS
EKG Q-T INTERVAL: 330 MS
EKG QRS DURATION: 104 MS
EKG QTC CALCULATION (BAZETT): 472 MS
EKG R AXIS: -47 DEGREES
EKG T AXIS: 94 DEGREES
EKG VENTRICULAR RATE: 123 BPM
EOSINOPHIL # BLD: 0 K/UL (ref 0–0.6)
EOSINOPHIL NFR BLD: 0 %
FLUAV RNA RESP QL NAA+PROBE: NOT DETECTED
FLUBV RNA RESP QL NAA+PROBE: NOT DETECTED
GFR SERPLBLD CREATININE-BSD FMLA CKD-EPI: >90 ML/MIN/{1.73_M2}
GLUCOSE SERPL-MCNC: 142 MG/DL (ref 70–99)
GLUCOSE UR STRIP.AUTO-MCNC: NEGATIVE MG/DL
HCO3 BLDV-SCNC: 25.3 MMOL/L (ref 23–29)
HCT VFR BLD AUTO: 44 % (ref 40.5–52.5)
HGB BLD-MCNC: 15 G/DL (ref 13.5–17.5)
HGB UR QL STRIP.AUTO: ABNORMAL
KETONES UR STRIP.AUTO-MCNC: 40 MG/DL
LACTATE BLDV-SCNC: 1.4 MMOL/L (ref 0.4–1.9)
LACTATE BLDV-SCNC: 2 MMOL/L (ref 0.4–1.9)
LEUKOCYTE ESTERASE UR QL STRIP.AUTO: ABNORMAL
LYMPHOCYTES # BLD: 0.3 K/UL (ref 1–5.1)
LYMPHOCYTES NFR BLD: 1.6 %
MAGNESIUM SERPL-MCNC: 2.24 MG/DL (ref 1.8–2.4)
MCH RBC QN AUTO: 32.3 PG (ref 26–34)
MCHC RBC AUTO-ENTMCNC: 34.2 G/DL (ref 31–36)
MCV RBC AUTO: 94.6 FL (ref 80–100)
METHGB MFR BLDV: 0.3 %
MONOCYTES # BLD: 0.9 K/UL (ref 0–1.3)
MONOCYTES NFR BLD: 5.3 %
NEUTROPHILS # BLD: 15 K/UL (ref 1.7–7.7)
NEUTROPHILS NFR BLD: 93 %
NITRITE UR QL STRIP.AUTO: NEGATIVE
NT-PROBNP SERPL-MCNC: 4673 PG/ML (ref 0–449)
O2 THERAPY: ABNORMAL
PCO2 BLDV: 37.3 MMHG (ref 40–50)
PH BLDV: 7.45 [PH] (ref 7.35–7.45)
PH UR STRIP.AUTO: 6.5 [PH] (ref 5–8)
PLATELET # BLD AUTO: 205 K/UL (ref 135–450)
PMV BLD AUTO: 6.9 FL (ref 5–10.5)
PO2 BLDV: 49.9 MMHG (ref 25–40)
POTASSIUM SERPL-SCNC: 3.4 MMOL/L (ref 3.5–5.1)
PROCALCITONIN SERPL IA-MCNC: 3.09 NG/ML (ref 0–0.15)
PROT SERPL-MCNC: 7.4 G/DL (ref 6.4–8.2)
PROT UR STRIP.AUTO-MCNC: 100 MG/DL
RBC # BLD AUTO: 4.65 M/UL (ref 4.2–5.9)
RBC #/AREA URNS HPF: NORMAL /HPF (ref 0–4)
SAO2 % BLDV: 86 %
SARS-COV-2 RNA RESP QL NAA+PROBE: NOT DETECTED
SODIUM SERPL-SCNC: 140 MMOL/L (ref 136–145)
SP GR UR STRIP.AUTO: 1.02 (ref 1–1.03)
TROPONIN, HIGH SENSITIVITY: 27 NG/L (ref 0–22)
UA COMPLETE W REFLEX CULTURE PNL UR: ABNORMAL
UA DIPSTICK W REFLEX MICRO PNL UR: YES
URN SPEC COLLECT METH UR: ABNORMAL
UROBILINOGEN UR STRIP-ACNC: 2 E.U./DL
WBC # BLD AUTO: 16.2 K/UL (ref 4–11)
WBC #/AREA URNS HPF: NORMAL /HPF (ref 0–5)

## 2025-01-11 PROCEDURE — 87040 BLOOD CULTURE FOR BACTERIA: CPT

## 2025-01-11 PROCEDURE — 2580000003 HC RX 258: Performed by: EMERGENCY MEDICINE

## 2025-01-11 PROCEDURE — 84145 PROCALCITONIN (PCT): CPT

## 2025-01-11 PROCEDURE — 71045 X-RAY EXAM CHEST 1 VIEW: CPT

## 2025-01-11 PROCEDURE — 6360000002 HC RX W HCPCS: Performed by: EMERGENCY MEDICINE

## 2025-01-11 PROCEDURE — 96375 TX/PRO/DX INJ NEW DRUG ADDON: CPT

## 2025-01-11 PROCEDURE — 83880 ASSAY OF NATRIURETIC PEPTIDE: CPT

## 2025-01-11 PROCEDURE — 83605 ASSAY OF LACTIC ACID: CPT

## 2025-01-11 PROCEDURE — 70450 CT HEAD/BRAIN W/O DYE: CPT

## 2025-01-11 PROCEDURE — 87636 SARSCOV2 & INF A&B AMP PRB: CPT

## 2025-01-11 PROCEDURE — 6370000000 HC RX 637 (ALT 250 FOR IP): Performed by: INTERNAL MEDICINE

## 2025-01-11 PROCEDURE — 74177 CT ABD & PELVIS W/CONTRAST: CPT

## 2025-01-11 PROCEDURE — 87150 DNA/RNA AMPLIFIED PROBE: CPT

## 2025-01-11 PROCEDURE — 36415 COLL VENOUS BLD VENIPUNCTURE: CPT

## 2025-01-11 PROCEDURE — 80053 COMPREHEN METABOLIC PANEL: CPT

## 2025-01-11 PROCEDURE — 73030 X-RAY EXAM OF SHOULDER: CPT

## 2025-01-11 PROCEDURE — 2700000000 HC OXYGEN THERAPY PER DAY

## 2025-01-11 PROCEDURE — 6360000004 HC RX CONTRAST MEDICATION: Performed by: EMERGENCY MEDICINE

## 2025-01-11 PROCEDURE — 96365 THER/PROPH/DIAG IV INF INIT: CPT

## 2025-01-11 PROCEDURE — 2580000003 HC RX 258: Performed by: INTERNAL MEDICINE

## 2025-01-11 PROCEDURE — 81001 URINALYSIS AUTO W/SCOPE: CPT

## 2025-01-11 PROCEDURE — 94761 N-INVAS EAR/PLS OXIMETRY MLT: CPT

## 2025-01-11 PROCEDURE — 84484 ASSAY OF TROPONIN QUANT: CPT

## 2025-01-11 PROCEDURE — 82803 BLOOD GASES ANY COMBINATION: CPT

## 2025-01-11 PROCEDURE — 93005 ELECTROCARDIOGRAM TRACING: CPT | Performed by: EMERGENCY MEDICINE

## 2025-01-11 PROCEDURE — 96368 THER/DIAG CONCURRENT INF: CPT

## 2025-01-11 PROCEDURE — 96361 HYDRATE IV INFUSION ADD-ON: CPT

## 2025-01-11 PROCEDURE — 93010 ELECTROCARDIOGRAM REPORT: CPT | Performed by: INTERNAL MEDICINE

## 2025-01-11 PROCEDURE — 1200000000 HC SEMI PRIVATE

## 2025-01-11 PROCEDURE — 73502 X-RAY EXAM HIP UNI 2-3 VIEWS: CPT

## 2025-01-11 PROCEDURE — 6370000000 HC RX 637 (ALT 250 FOR IP): Performed by: EMERGENCY MEDICINE

## 2025-01-11 PROCEDURE — 6360000002 HC RX W HCPCS: Performed by: INTERNAL MEDICINE

## 2025-01-11 PROCEDURE — 83735 ASSAY OF MAGNESIUM: CPT

## 2025-01-11 PROCEDURE — 85025 COMPLETE CBC W/AUTO DIFF WBC: CPT

## 2025-01-11 PROCEDURE — 99285 EMERGENCY DEPT VISIT HI MDM: CPT

## 2025-01-11 RX ORDER — KETOROLAC TROMETHAMINE 30 MG/ML
15 INJECTION, SOLUTION INTRAMUSCULAR; INTRAVENOUS ONCE
Status: COMPLETED | OUTPATIENT
Start: 2025-01-11 | End: 2025-01-11

## 2025-01-11 RX ORDER — ACETAMINOPHEN 650 MG/1
650 SUPPOSITORY RECTAL EVERY 6 HOURS PRN
Status: DISCONTINUED | OUTPATIENT
Start: 2025-01-11 | End: 2025-01-20 | Stop reason: HOSPADM

## 2025-01-11 RX ORDER — ENOXAPARIN SODIUM 100 MG/ML
40 INJECTION SUBCUTANEOUS DAILY
Status: DISCONTINUED | OUTPATIENT
Start: 2025-01-11 | End: 2025-01-20 | Stop reason: HOSPADM

## 2025-01-11 RX ORDER — IOPAMIDOL 755 MG/ML
75 INJECTION, SOLUTION INTRAVASCULAR
Status: COMPLETED | OUTPATIENT
Start: 2025-01-11 | End: 2025-01-11

## 2025-01-11 RX ORDER — SODIUM CHLORIDE 9 MG/ML
INJECTION, SOLUTION INTRAVENOUS PRN
Status: DISCONTINUED | OUTPATIENT
Start: 2025-01-11 | End: 2025-01-20 | Stop reason: HOSPADM

## 2025-01-11 RX ORDER — ACETAMINOPHEN 325 MG/1
650 TABLET ORAL EVERY 6 HOURS PRN
Status: DISCONTINUED | OUTPATIENT
Start: 2025-01-11 | End: 2025-01-20 | Stop reason: HOSPADM

## 2025-01-11 RX ORDER — AZITHROMYCIN 250 MG/1
250 TABLET, FILM COATED ORAL DAILY
Status: COMPLETED | OUTPATIENT
Start: 2025-01-12 | End: 2025-01-15

## 2025-01-11 RX ORDER — 0.9 % SODIUM CHLORIDE 0.9 %
250 INTRAVENOUS SOLUTION INTRAVENOUS ONCE
Status: DISCONTINUED | OUTPATIENT
Start: 2025-01-11 | End: 2025-01-11

## 2025-01-11 RX ORDER — ONDANSETRON 2 MG/ML
4 INJECTION INTRAMUSCULAR; INTRAVENOUS EVERY 6 HOURS PRN
Status: DISCONTINUED | OUTPATIENT
Start: 2025-01-11 | End: 2025-01-20 | Stop reason: HOSPADM

## 2025-01-11 RX ORDER — ACETAMINOPHEN 500 MG
1000 TABLET ORAL ONCE
Status: COMPLETED | OUTPATIENT
Start: 2025-01-11 | End: 2025-01-11

## 2025-01-11 RX ORDER — POTASSIUM CHLORIDE 7.45 MG/ML
10 INJECTION INTRAVENOUS
Status: DISCONTINUED | OUTPATIENT
Start: 2025-01-11 | End: 2025-01-11

## 2025-01-11 RX ORDER — SODIUM CHLORIDE 0.9 % (FLUSH) 0.9 %
5-40 SYRINGE (ML) INJECTION EVERY 12 HOURS SCHEDULED
Status: DISCONTINUED | OUTPATIENT
Start: 2025-01-11 | End: 2025-01-20 | Stop reason: HOSPADM

## 2025-01-11 RX ORDER — ONDANSETRON 4 MG/1
4 TABLET, ORALLY DISINTEGRATING ORAL EVERY 8 HOURS PRN
Status: DISCONTINUED | OUTPATIENT
Start: 2025-01-11 | End: 2025-01-20 | Stop reason: HOSPADM

## 2025-01-11 RX ORDER — SODIUM CHLORIDE 9 MG/ML
INJECTION, SOLUTION INTRAVENOUS CONTINUOUS
Status: ACTIVE | OUTPATIENT
Start: 2025-01-11 | End: 2025-01-12

## 2025-01-11 RX ORDER — POLYETHYLENE GLYCOL 3350 17 G/17G
17 POWDER, FOR SOLUTION ORAL DAILY PRN
Status: DISCONTINUED | OUTPATIENT
Start: 2025-01-11 | End: 2025-01-20 | Stop reason: HOSPADM

## 2025-01-11 RX ORDER — VANCOMYCIN 2 G/400ML
25 INJECTION, SOLUTION INTRAVENOUS ONCE
Status: COMPLETED | OUTPATIENT
Start: 2025-01-11 | End: 2025-01-11

## 2025-01-11 RX ORDER — AZITHROMYCIN 250 MG/1
500 TABLET, FILM COATED ORAL ONCE
Status: COMPLETED | OUTPATIENT
Start: 2025-01-11 | End: 2025-01-11

## 2025-01-11 RX ORDER — FENTANYL CITRATE 50 UG/ML
50 INJECTION, SOLUTION INTRAMUSCULAR; INTRAVENOUS
Status: DISCONTINUED | OUTPATIENT
Start: 2025-01-11 | End: 2025-01-11

## 2025-01-11 RX ORDER — 0.9 % SODIUM CHLORIDE 0.9 %
2000 INTRAVENOUS SOLUTION INTRAVENOUS ONCE
Status: COMPLETED | OUTPATIENT
Start: 2025-01-11 | End: 2025-01-11

## 2025-01-11 RX ORDER — SODIUM CHLORIDE 0.9 % (FLUSH) 0.9 %
5-40 SYRINGE (ML) INJECTION PRN
Status: DISCONTINUED | OUTPATIENT
Start: 2025-01-11 | End: 2025-01-20 | Stop reason: HOSPADM

## 2025-01-11 RX ADMIN — ACETAMINOPHEN 1000 MG: 500 TABLET ORAL at 11:58

## 2025-01-11 RX ADMIN — ENOXAPARIN SODIUM 40 MG: 100 INJECTION SUBCUTANEOUS at 16:37

## 2025-01-11 RX ADMIN — FENTANYL CITRATE 50 MCG: 50 INJECTION INTRAMUSCULAR; INTRAVENOUS at 10:28

## 2025-01-11 RX ADMIN — IOPAMIDOL 75 ML: 755 INJECTION, SOLUTION INTRAVENOUS at 12:57

## 2025-01-11 RX ADMIN — KETOROLAC TROMETHAMINE 15 MG: 30 INJECTION INTRAMUSCULAR; INTRAVENOUS at 11:57

## 2025-01-11 RX ADMIN — POTASSIUM CHLORIDE 10 MEQ: 7.46 INJECTION, SOLUTION INTRAVENOUS at 14:57

## 2025-01-11 RX ADMIN — PIPERACILLIN AND TAZOBACTAM 3375 MG: 3; .375 INJECTION, POWDER, LYOPHILIZED, FOR SOLUTION INTRAVENOUS at 12:00

## 2025-01-11 RX ADMIN — SODIUM CHLORIDE: 9 INJECTION, SOLUTION INTRAVENOUS at 16:41

## 2025-01-11 RX ADMIN — SODIUM CHLORIDE 250 ML/HR: 9 INJECTION, SOLUTION INTRAVENOUS at 13:54

## 2025-01-11 RX ADMIN — VANCOMYCIN 2000 MG: 2 INJECTION, SOLUTION INTRAVENOUS at 12:22

## 2025-01-11 RX ADMIN — AZITHROMYCIN 500 MG: 250 TABLET, FILM COATED ORAL at 16:36

## 2025-01-11 RX ADMIN — SODIUM CHLORIDE 2000 ML: 9 INJECTION, SOLUTION INTRAVENOUS at 10:28

## 2025-01-11 RX ADMIN — ACETAMINOPHEN 650 MG: 325 TABLET ORAL at 20:55

## 2025-01-11 RX ADMIN — SODIUM CHLORIDE: 9 INJECTION, SOLUTION INTRAVENOUS at 20:51

## 2025-01-11 RX ADMIN — POTASSIUM CHLORIDE 10 MEQ: 7.46 INJECTION, SOLUTION INTRAVENOUS at 13:54

## 2025-01-11 ASSESSMENT — LIFESTYLE VARIABLES
HOW OFTEN DO YOU HAVE A DRINK CONTAINING ALCOHOL: NEVER
HOW MANY STANDARD DRINKS CONTAINING ALCOHOL DO YOU HAVE ON A TYPICAL DAY: PATIENT DOES NOT DRINK

## 2025-01-11 ASSESSMENT — PAIN SCALES - GENERAL
PAINLEVEL_OUTOF10: 9
PAINLEVEL_OUTOF10: 10
PAINLEVEL_OUTOF10: 7

## 2025-01-11 ASSESSMENT — PAIN - FUNCTIONAL ASSESSMENT: PAIN_FUNCTIONAL_ASSESSMENT: 0-10

## 2025-01-11 ASSESSMENT — PAIN DESCRIPTION - LOCATION
LOCATION: HIP;SHOULDER
LOCATION: NECK

## 2025-01-11 ASSESSMENT — PAIN DESCRIPTION - ORIENTATION
ORIENTATION: LEFT
ORIENTATION: LEFT

## 2025-01-11 ASSESSMENT — PAIN DESCRIPTION - DESCRIPTORS: DESCRIPTORS: JABBING

## 2025-01-11 ASSESSMENT — PAIN DESCRIPTION - PAIN TYPE: TYPE: ACUTE PAIN

## 2025-01-11 NOTE — ED NOTES
Jerome Maria is a 74 y.o. male admitted for  Principal Problem:    Leukocytosis  Resolved Problems:    * No resolved hospital problems. *  .   Patient from home via EMS transportation with   Chief Complaint   Patient presents with    Fall     Fell 2 days ago    Hip Pain     Left hip    Shoulder Pain     Left shoulder    Fatigue     Pt states he is suffering from an acute general illness. Hr 130 and 02 sat 78 per EMS upon arrival    Back Pain   .  Patient is alert and Person, Place, Time, and Situation  Patient's baseline mobility: Baseline Mobility: Walker  Code Status: Prior   Cardiac Rhythm:       Is patient on baseline Oxygen: no how many Liters 2L: patient desat to 85% on RA after fentanyl administration, when patient is awake 95-99 on RA, baseline does not wear oxygen   Abnormal Assessment Findings: Pt reports a fall 2 days ago and has had pain in the L shoulder and L hip, imaging today showed no acute findings. Pt reports symptoms of cough, fatigue, generalized weakness, and malaise progressive over the last week. Pt had part of his tongue removed d/t cancer and has mumbled speech, difficult to understand pt at times d/t anatomy. Pt is A+Ox4.    Isolation: None      NIH Score:    C-SSRS: Risk of Suicide: No Risk  Bedside swallow:        Active LDA's:   Peripheral IV 01/11/25 Right Forearm (Active)       Peripheral IV 01/11/25 Left Forearm (Active)   Site Assessment Clean, dry & intact 01/11/25 1223   Line Status Normal saline locked 01/11/25 1223   Dressing Status Clean, dry & intact 01/11/25 1223         Family/Caregiver Present no Any Concerns: no   Restraints no  Sitter no         Vitals: MEWS Score: 6    Vitals:    01/11/25 1141 01/11/25 1213 01/11/25 1318 01/11/25 1347   BP:  (!) 149/92 131/78 (!) 150/94   Pulse:  (!) 116 (!) 108 (!) 103   Resp:  20 19 16   Temp: (!) 100.7 °F (38.2 °C)      TempSrc: Rectal      SpO2:  92% 100% 100%   Weight:       Height:           Last documented pain score (0-10

## 2025-01-11 NOTE — ED PROVIDER NOTES
EMERGENCY DEPARTMENT ENCOUNTER     [facility] EMERGENCY DEPARTMENT     Pt Name: Jerome Maria   MRN: [id number]   Birthdate 1950   Date of evaluation: 1/11/2025   Provider: Raffaele Hernandez MD   PCP: No primary care provider on file.   Note Started: 3:29 PM EST 1/11/25     CHIEF COMPLAINT     Chief Complaint   Patient presents with    Fall     Fell 2 days ago    Hip Pain     Left hip    Shoulder Pain     Left shoulder    Fatigue     Pt states he is suffering from an acute general illness. Hr 130 and 02 sat 78 per EMS upon arrival    Back Pain        HISTORY OF PRESENT ILLNESS:  History from : Patient   Limitations to history : None     Jerome Maria is a 74 y.o. male who presents for Fall.  Patient reports that he had a fall earlier today.  He complains of generalized weakness.  Denies any specific injury other than left hip and shoulder pain.  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    Nursing Notes were all reviewed and agreed with or any disagreements were addressed in the HPI.     ROS: Positives and Pertinent negatives as per HPI.    PAST MEDICAL HISTORY     Past medical history:  has a past medical history of Cancer (HCC), Radiation, and S/P chemotherapy, time since greater than 12 weeks.    Past surgical history:  has a past surgical history that includes other surgical history; eye surgery (Right, 12/26/13); and Upper gastrointestinal endoscopy (N/A, 7/26/2021).    Med list:   No current facility-administered medications on file prior to encounter.     Current Outpatient Medications on File Prior to Encounter   Medication Sig Dispense Refill    metoprolol tartrate (LOPRESSOR) 25 MG tablet Take 1 tablet by mouth 2 times daily (Patient not taking: Reported on 4/16/2024) 60 tablet 3    amLODIPine (NORVASC) 5 MG tablet Take 1 tablet by mouth daily (Patient not taking: Reported on 4/16/2024) 30 tablet 3       PHYSICAL EXAM:  ED

## 2025-01-12 LAB
ALBUMIN SERPL-MCNC: 3 G/DL (ref 3.4–5)
ANION GAP SERPL CALCULATED.3IONS-SCNC: 10 MMOL/L (ref 3–16)
BUN SERPL-MCNC: 21 MG/DL (ref 7–20)
CALCIUM SERPL-MCNC: 8.9 MG/DL (ref 8.3–10.6)
CHLORIDE SERPL-SCNC: 106 MMOL/L (ref 99–110)
CO2 SERPL-SCNC: 23 MMOL/L (ref 21–32)
CREAT SERPL-MCNC: 0.6 MG/DL (ref 0.8–1.3)
DEPRECATED RDW RBC AUTO: 14.2 % (ref 12.4–15.4)
GFR SERPLBLD CREATININE-BSD FMLA CKD-EPI: >90 ML/MIN/{1.73_M2}
GLUCOSE SERPL-MCNC: 101 MG/DL (ref 70–99)
HCT VFR BLD AUTO: 38.5 % (ref 40.5–52.5)
HGB BLD-MCNC: 12.9 G/DL (ref 13.5–17.5)
MAGNESIUM SERPL-MCNC: 2.14 MG/DL (ref 1.8–2.4)
MCH RBC QN AUTO: 32.1 PG (ref 26–34)
MCHC RBC AUTO-ENTMCNC: 33.5 G/DL (ref 31–36)
MCV RBC AUTO: 95.9 FL (ref 80–100)
PHOSPHATE SERPL-MCNC: 1.6 MG/DL (ref 2.5–4.9)
PLATELET # BLD AUTO: 190 K/UL (ref 135–450)
PMV BLD AUTO: 7 FL (ref 5–10.5)
POTASSIUM SERPL-SCNC: 3.1 MMOL/L (ref 3.5–5.1)
RBC # BLD AUTO: 4.02 M/UL (ref 4.2–5.9)
REPORT: NORMAL
SODIUM SERPL-SCNC: 139 MMOL/L (ref 136–145)
WBC # BLD AUTO: 15.2 K/UL (ref 4–11)

## 2025-01-12 PROCEDURE — 80069 RENAL FUNCTION PANEL: CPT

## 2025-01-12 PROCEDURE — 6370000000 HC RX 637 (ALT 250 FOR IP): Performed by: INTERNAL MEDICINE

## 2025-01-12 PROCEDURE — 97110 THERAPEUTIC EXERCISES: CPT

## 2025-01-12 PROCEDURE — 97530 THERAPEUTIC ACTIVITIES: CPT

## 2025-01-12 PROCEDURE — 6360000002 HC RX W HCPCS: Performed by: INTERNAL MEDICINE

## 2025-01-12 PROCEDURE — 1200000000 HC SEMI PRIVATE

## 2025-01-12 PROCEDURE — 85027 COMPLETE CBC AUTOMATED: CPT

## 2025-01-12 PROCEDURE — 97162 PT EVAL MOD COMPLEX 30 MIN: CPT

## 2025-01-12 PROCEDURE — 2580000003 HC RX 258: Performed by: INTERNAL MEDICINE

## 2025-01-12 PROCEDURE — 83735 ASSAY OF MAGNESIUM: CPT

## 2025-01-12 PROCEDURE — 2500000003 HC RX 250 WO HCPCS: Performed by: INTERNAL MEDICINE

## 2025-01-12 PROCEDURE — 36415 COLL VENOUS BLD VENIPUNCTURE: CPT

## 2025-01-12 PROCEDURE — 97166 OT EVAL MOD COMPLEX 45 MIN: CPT

## 2025-01-12 RX ORDER — OXYCODONE HYDROCHLORIDE 5 MG/1
10 TABLET ORAL EVERY 4 HOURS PRN
Status: DISCONTINUED | OUTPATIENT
Start: 2025-01-12 | End: 2025-01-20 | Stop reason: HOSPADM

## 2025-01-12 RX ORDER — OXYCODONE HYDROCHLORIDE 5 MG/1
5 TABLET ORAL EVERY 4 HOURS PRN
Status: DISCONTINUED | OUTPATIENT
Start: 2025-01-12 | End: 2025-01-20 | Stop reason: HOSPADM

## 2025-01-12 RX ADMIN — SODIUM CHLORIDE: 9 INJECTION, SOLUTION INTRAVENOUS at 05:11

## 2025-01-12 RX ADMIN — OXYCODONE 10 MG: 5 TABLET ORAL at 23:18

## 2025-01-12 RX ADMIN — Medication 10 ML: at 19:47

## 2025-01-12 RX ADMIN — ACETAMINOPHEN 650 MG: 325 TABLET ORAL at 15:44

## 2025-01-12 RX ADMIN — OXYCODONE 10 MG: 5 TABLET ORAL at 17:09

## 2025-01-12 RX ADMIN — Medication 10 ML: at 09:16

## 2025-01-12 RX ADMIN — POTASSIUM PHOSPHATE, MONOBASIC AND POTASSIUM PHOSPHATE, DIBASIC 20 MMOL: 224; 236 INJECTION, SOLUTION, CONCENTRATE INTRAVENOUS at 15:40

## 2025-01-12 RX ADMIN — SODIUM CHLORIDE: 9 INJECTION, SOLUTION INTRAVENOUS at 15:40

## 2025-01-12 RX ADMIN — AZITHROMYCIN 250 MG: 250 TABLET, FILM COATED ORAL at 09:13

## 2025-01-12 RX ADMIN — WATER 1000 MG: 1 INJECTION INTRAMUSCULAR; INTRAVENOUS; SUBCUTANEOUS at 09:16

## 2025-01-12 RX ADMIN — ENOXAPARIN SODIUM 40 MG: 100 INJECTION SUBCUTANEOUS at 09:23

## 2025-01-12 RX ADMIN — ACETAMINOPHEN 650 MG: 325 TABLET ORAL at 05:40

## 2025-01-12 ASSESSMENT — PAIN SCALES - GENERAL
PAINLEVEL_OUTOF10: 8
PAINLEVEL_OUTOF10: 10
PAINLEVEL_OUTOF10: 9
PAINLEVEL_OUTOF10: 10
PAINLEVEL_OUTOF10: 0

## 2025-01-12 ASSESSMENT — PAIN DESCRIPTION - LOCATION
LOCATION: BACK
LOCATION: BACK
LOCATION: HIP

## 2025-01-12 ASSESSMENT — PAIN DESCRIPTION - ORIENTATION
ORIENTATION: LEFT
ORIENTATION: LOWER

## 2025-01-12 ASSESSMENT — PAIN DESCRIPTION - DESCRIPTORS
DESCRIPTORS: THROBBING
DESCRIPTORS: ACHING

## 2025-01-12 NOTE — PLAN OF CARE
Problem: Discharge Planning  Goal: Discharge to home or other facility with appropriate resources  Outcome: Progressing     Problem: Pain  Goal: Verbalizes/displays adequate comfort level or baseline comfort level  Outcome: Progressing     Problem: Safety - Adult  Goal: Free from fall injury  Outcome: Progressing     Problem: ABCDS Injury Assessment  Goal: Absence of physical injury  Outcome: Progressing     Problem: Skin/Tissue Integrity  Goal: Absence of new skin breakdown  Description: 1.  Monitor for areas of redness and/or skin breakdown  2.  Assess vascular access sites hourly  Outcome: Progressing     Problem: Neurosensory - Adult  Goal: Achieves maximal functionality and self care  Outcome: Progressing     Problem: Skin/Tissue Integrity - Adult  Goal: Skin integrity remains intact  Outcome: Progressing     Problem: Musculoskeletal - Adult  Goal: Return mobility to safest level of function  Outcome: Progressing     Problem: Gastrointestinal - Adult  Goal: Maintains adequate nutritional intake  Outcome: Progressing     Problem: Genitourinary - Adult  Goal: Absence of urinary retention  Outcome: Progressing

## 2025-01-12 NOTE — H&P
Hospital Medicine History & Physical    V 1.6    Date of Admission: 1/11/2025    Date of Service:  Pt seen/examined on 12 Jan 2024     [x]Admitted to Inpatient with expected LOS greater than two midnights due to medical therapy.  []Placed in Observation status.    Chief Admission Complaint:  \"Fall w/ L shoulder/hip pain\"    Presenting Admission History:        \"74 y.o. male w/ hx of tongue cancer, which makes his speech difficult to understand at times who presented to [facility] s/p mechanical fall w/out significant head trauma of LOC but with c/o L sided shoulder/hip and back pain.  PMHx significant for HTN w/out known CAD.      The patient denies any fever/chills or other signs/sxs of systemic illness or identifiable aggravating/alleviating factors.\"      Assessment/Plan:      Current Principal Problem:  Leukocytosis      Fall - likely mechanical w/out antecedent sxs suggestive of Neuro/cardiogenic etiology.  PT/OT ordered and pending     HTN - w/out known CAD and no evidence of active signs and/or symptoms of ischemia and/or failure. Currently controlled on home meds w/ vitals documented and reviewed.      Tachycardia - of unclear etiology.  Monitor clinical response to IVF/IV ABX    Leukocytosis - of unclear etiology.  CXR/Urine negative and no other sxs suggesting infection.  Will continue to follow serial labs.  Reviewed and documented in this note    HypoKalemia - etiology clinically unable to determine.  Followed serial Potassium, personally reviewed and documented in this note. Replaced PRN.      HypoPhosphatemia - etiology clinically unable to determine.  Followed serial Phosphorus, personally reviewed and documented in this note. Replaced PRN.          CXR: I have reviewed the CXR with the following interpretation: No acute ASD  EKG:  I have reviewed the EKG with the following interpretation: Tachy w/out acute ischemic changes     Physical Exam Performed:      BP (!) 168/82   Pulse

## 2025-01-13 LAB
ALBUMIN SERPL-MCNC: 2.9 G/DL (ref 3.4–5)
ANION GAP SERPL CALCULATED.3IONS-SCNC: 10 MMOL/L (ref 3–16)
BACTERIA BLD CULT: ABNORMAL
BUN SERPL-MCNC: 17 MG/DL (ref 7–20)
CALCIUM SERPL-MCNC: 8.7 MG/DL (ref 8.3–10.6)
CHLORIDE SERPL-SCNC: 105 MMOL/L (ref 99–110)
CO2 SERPL-SCNC: 25 MMOL/L (ref 21–32)
CREAT SERPL-MCNC: 0.5 MG/DL (ref 0.8–1.3)
DEPRECATED RDW RBC AUTO: 13.8 % (ref 12.4–15.4)
GFR SERPLBLD CREATININE-BSD FMLA CKD-EPI: >90 ML/MIN/{1.73_M2}
GLUCOSE SERPL-MCNC: 99 MG/DL (ref 70–99)
HCT VFR BLD AUTO: 36.2 % (ref 40.5–52.5)
HGB BLD-MCNC: 12.4 G/DL (ref 13.5–17.5)
MCH RBC QN AUTO: 32.5 PG (ref 26–34)
MCHC RBC AUTO-ENTMCNC: 34.3 G/DL (ref 31–36)
MCV RBC AUTO: 94.8 FL (ref 80–100)
ORGANISM: ABNORMAL
PHOSPHATE SERPL-MCNC: 1.5 MG/DL (ref 2.5–4.9)
PLATELET # BLD AUTO: 238 K/UL (ref 135–450)
PMV BLD AUTO: 7.3 FL (ref 5–10.5)
POTASSIUM SERPL-SCNC: 2.8 MMOL/L (ref 3.5–5.1)
RBC # BLD AUTO: 3.82 M/UL (ref 4.2–5.9)
SODIUM SERPL-SCNC: 140 MMOL/L (ref 136–145)
WBC # BLD AUTO: 10.6 K/UL (ref 4–11)

## 2025-01-13 PROCEDURE — 80069 RENAL FUNCTION PANEL: CPT

## 2025-01-13 PROCEDURE — 6370000000 HC RX 637 (ALT 250 FOR IP): Performed by: INTERNAL MEDICINE

## 2025-01-13 PROCEDURE — 36415 COLL VENOUS BLD VENIPUNCTURE: CPT

## 2025-01-13 PROCEDURE — 2580000003 HC RX 258: Performed by: INTERNAL MEDICINE

## 2025-01-13 PROCEDURE — 2500000003 HC RX 250 WO HCPCS: Performed by: INTERNAL MEDICINE

## 2025-01-13 PROCEDURE — 1200000000 HC SEMI PRIVATE

## 2025-01-13 PROCEDURE — 6360000002 HC RX W HCPCS: Performed by: INTERNAL MEDICINE

## 2025-01-13 PROCEDURE — 85027 COMPLETE CBC AUTOMATED: CPT

## 2025-01-13 RX ORDER — HYDRALAZINE HYDROCHLORIDE 20 MG/ML
10 INJECTION INTRAMUSCULAR; INTRAVENOUS EVERY 4 HOURS PRN
Status: DISCONTINUED | OUTPATIENT
Start: 2025-01-13 | End: 2025-01-20 | Stop reason: HOSPADM

## 2025-01-13 RX ORDER — METOPROLOL SUCCINATE 25 MG/1
25 TABLET, EXTENDED RELEASE ORAL DAILY
Status: DISCONTINUED | OUTPATIENT
Start: 2025-01-13 | End: 2025-01-20 | Stop reason: HOSPADM

## 2025-01-13 RX ORDER — AMLODIPINE BESYLATE 5 MG/1
5 TABLET ORAL DAILY
Status: DISCONTINUED | OUTPATIENT
Start: 2025-01-13 | End: 2025-01-20 | Stop reason: HOSPADM

## 2025-01-13 RX ADMIN — WATER 1000 MG: 1 INJECTION INTRAMUSCULAR; INTRAVENOUS; SUBCUTANEOUS at 09:15

## 2025-01-13 RX ADMIN — POTASSIUM BICARBONATE 40 MEQ: 782 TABLET, EFFERVESCENT ORAL at 19:38

## 2025-01-13 RX ADMIN — WATER 1000 MG: 1 INJECTION INTRAMUSCULAR; INTRAVENOUS; SUBCUTANEOUS at 14:35

## 2025-01-13 RX ADMIN — AZITHROMYCIN 250 MG: 250 TABLET, FILM COATED ORAL at 09:09

## 2025-01-13 RX ADMIN — AMLODIPINE BESYLATE 5 MG: 5 TABLET ORAL at 11:36

## 2025-01-13 RX ADMIN — Medication 10 ML: at 09:11

## 2025-01-13 RX ADMIN — OXYCODONE 10 MG: 5 TABLET ORAL at 11:30

## 2025-01-13 RX ADMIN — METOPROLOL SUCCINATE 25 MG: 25 TABLET, EXTENDED RELEASE ORAL at 11:36

## 2025-01-13 RX ADMIN — Medication 10 ML: at 19:37

## 2025-01-13 RX ADMIN — POTASSIUM BICARBONATE 40 MEQ: 782 TABLET, EFFERVESCENT ORAL at 09:08

## 2025-01-13 RX ADMIN — POTASSIUM PHOSPHATE, MONOBASIC AND POTASSIUM PHOSPHATE, DIBASIC 20 MMOL: 224; 236 INJECTION, SOLUTION, CONCENTRATE INTRAVENOUS at 09:14

## 2025-01-13 RX ADMIN — OXYCODONE 10 MG: 5 TABLET ORAL at 06:11

## 2025-01-13 RX ADMIN — OXYCODONE 10 MG: 5 TABLET ORAL at 19:34

## 2025-01-13 RX ADMIN — ENOXAPARIN SODIUM 40 MG: 100 INJECTION SUBCUTANEOUS at 09:16

## 2025-01-13 ASSESSMENT — PAIN DESCRIPTION - LOCATION
LOCATION: LEG
LOCATION: HIP
LOCATION: HIP

## 2025-01-13 ASSESSMENT — PAIN SCALES - GENERAL
PAINLEVEL_OUTOF10: 10
PAINLEVEL_OUTOF10: 8
PAINLEVEL_OUTOF10: 9

## 2025-01-13 ASSESSMENT — PAIN DESCRIPTION - ORIENTATION
ORIENTATION: LEFT

## 2025-01-13 ASSESSMENT — PAIN DESCRIPTION - DESCRIPTORS
DESCRIPTORS: ACHING
DESCRIPTORS: THROBBING
DESCRIPTORS: THROBBING

## 2025-01-13 NOTE — PLAN OF CARE
Problem: Discharge Planning  Goal: Discharge to home or other facility with appropriate resources  1/13/2025 1047 by Magi Varela RN  Outcome: Progressing     Problem: Pain  Goal: Verbalizes/displays adequate comfort level or baseline comfort level  1/13/2025 1047 by Magi Varela RN  Outcome: Progressing     Problem: Safety - Adult  Goal: Free from fall injury  1/13/2025 1047 by Magi Varela RN  Outcome: Progressing       Problem: ABCDS Injury Assessment  Goal: Absence of physical injury  1/13/2025 1047 by Magi Varela RN  Outcome: Progressing     Problem: Skin/Tissue Integrity  Goal: Absence of new skin breakdown  Description: 1.  Monitor for areas of redness and/or skin breakdown  2.  Assess vascular access sites hourly  3.  Every 4-6 hours minimum:  Change oxygen saturation probe site  4.  Every 4-6 hours:  If on nasal continuous positive airway pressure, respiratory therapy assess nares and determine need for appliance change or resting period.  1/13/2025 1047 by Magi Varela RN  Outcome: Progressing    Problem: Neurosensory - Adult  Goal: Achieves maximal functionality and self care  1/13/2025 1047 by Magi Varela RN  Outcome: Progressing       Problem: Skin/Tissue Integrity - Adult  Goal: Skin integrity remains intact  1/13/2025 1047 by Magi Varela RN  Outcome: Progressing       Problem: Musculoskeletal - Adult  Goal: Return mobility to safest level of function  1/13/2025 1047 by Magi Varela RN  Outcome: Progressing       Problem: Gastrointestinal - Adult  Goal: Maintains adequate nutritional intake  1/13/2025 1047 by Magi Varela RN  Outcome: Progressing     Problem: Genitourinary - Adult  Goal: Absence of urinary retention  1/13/2025 1047 by Magi Varela, RN  Outcome: Progressing

## 2025-01-14 LAB
ANION GAP SERPL CALCULATED.3IONS-SCNC: 9 MMOL/L (ref 3–16)
BACTERIA BLD CULT ORG #2: ABNORMAL
BACTERIA BLD CULT ORG #2: ABNORMAL
BUN SERPL-MCNC: 16 MG/DL (ref 7–20)
CALCIUM SERPL-MCNC: 8.5 MG/DL (ref 8.3–10.6)
CHLORIDE SERPL-SCNC: 99 MMOL/L (ref 99–110)
CO2 SERPL-SCNC: 31 MMOL/L (ref 21–32)
CREAT SERPL-MCNC: 0.6 MG/DL (ref 0.8–1.3)
GFR SERPLBLD CREATININE-BSD FMLA CKD-EPI: >90 ML/MIN/{1.73_M2}
GLUCOSE SERPL-MCNC: 109 MG/DL (ref 70–99)
ORGANISM: ABNORMAL
ORGANISM: ABNORMAL
POTASSIUM SERPL-SCNC: 3.1 MMOL/L (ref 3.5–5.1)
SODIUM SERPL-SCNC: 139 MMOL/L (ref 136–145)

## 2025-01-14 PROCEDURE — 36415 COLL VENOUS BLD VENIPUNCTURE: CPT

## 2025-01-14 PROCEDURE — 80048 BASIC METABOLIC PNL TOTAL CA: CPT

## 2025-01-14 PROCEDURE — 2500000003 HC RX 250 WO HCPCS: Performed by: INTERNAL MEDICINE

## 2025-01-14 PROCEDURE — 6370000000 HC RX 637 (ALT 250 FOR IP): Performed by: INTERNAL MEDICINE

## 2025-01-14 PROCEDURE — 1200000000 HC SEMI PRIVATE

## 2025-01-14 PROCEDURE — 6360000002 HC RX W HCPCS: Performed by: INTERNAL MEDICINE

## 2025-01-14 RX ADMIN — OXYCODONE 10 MG: 5 TABLET ORAL at 10:19

## 2025-01-14 RX ADMIN — WATER 2000 MG: 1 INJECTION INTRAMUSCULAR; INTRAVENOUS; SUBCUTANEOUS at 09:08

## 2025-01-14 RX ADMIN — Medication 10 ML: at 22:42

## 2025-01-14 RX ADMIN — METOPROLOL SUCCINATE 25 MG: 25 TABLET, EXTENDED RELEASE ORAL at 09:07

## 2025-01-14 RX ADMIN — OXYCODONE 10 MG: 5 TABLET ORAL at 16:43

## 2025-01-14 RX ADMIN — Medication 10 ML: at 09:08

## 2025-01-14 RX ADMIN — AMLODIPINE BESYLATE 5 MG: 5 TABLET ORAL at 09:07

## 2025-01-14 RX ADMIN — OXYCODONE 10 MG: 5 TABLET ORAL at 05:50

## 2025-01-14 RX ADMIN — AZITHROMYCIN 250 MG: 250 TABLET, FILM COATED ORAL at 09:07

## 2025-01-14 RX ADMIN — ENOXAPARIN SODIUM 40 MG: 100 INJECTION SUBCUTANEOUS at 09:07

## 2025-01-14 ASSESSMENT — PAIN SCALES - GENERAL
PAINLEVEL_OUTOF10: 0
PAINLEVEL_OUTOF10: 10
PAINLEVEL_OUTOF10: 10
PAINLEVEL_OUTOF10: 3
PAINLEVEL_OUTOF10: 10

## 2025-01-14 ASSESSMENT — PAIN DESCRIPTION - ORIENTATION
ORIENTATION: LEFT
ORIENTATION: LEFT

## 2025-01-14 ASSESSMENT — PAIN DESCRIPTION - DESCRIPTORS
DESCRIPTORS: ACHING
DESCRIPTORS: ACHING

## 2025-01-14 ASSESSMENT — PAIN DESCRIPTION - LOCATION
LOCATION: HIP;NECK
LOCATION: ARM;LEG;BACK

## 2025-01-14 ASSESSMENT — PAIN DESCRIPTION - PAIN TYPE: TYPE: ACUTE PAIN

## 2025-01-14 ASSESSMENT — PAIN - FUNCTIONAL ASSESSMENT
PAIN_FUNCTIONAL_ASSESSMENT: PREVENTS OR INTERFERES SOME ACTIVE ACTIVITIES AND ADLS
PAIN_FUNCTIONAL_ASSESSMENT: PREVENTS OR INTERFERES SOME ACTIVE ACTIVITIES AND ADLS

## 2025-01-14 ASSESSMENT — PAIN DESCRIPTION - ONSET: ONSET: GRADUAL

## 2025-01-14 ASSESSMENT — PAIN DESCRIPTION - FREQUENCY: FREQUENCY: CONTINUOUS

## 2025-01-14 NOTE — PLAN OF CARE
Problem: Discharge Planning  Goal: Discharge to home or other facility with appropriate resources  1/13/2025 2352 by Reta Henson RN  Outcome: Progressing     Problem: Pain  Goal: Verbalizes/displays adequate comfort level or baseline comfort level  1/13/2025 2352 by Reta Henson RN  Outcome: Progressing  Flowsheets (Taken 1/13/2025 2352)  Verbalizes/displays adequate comfort level or baseline comfort level:   Encourage patient to monitor pain and request assistance   Assess pain using appropriate pain scale   Administer analgesics based on type and severity of pain and evaluate response   Implement non-pharmacological measures as appropriate and evaluate response     Problem: Safety - Adult  Goal: Free from fall injury  1/13/2025 2352 by Reta Henson RN  Outcome: Progressing     Problem: ABCDS Injury Assessment  Goal: Absence of physical injury  1/13/2025 2352 by Reta Henson RN  Outcome: Progressing     Problem: Skin/Tissue Integrity  Goal: Absence of new skin breakdown  Description: 1.  Monitor for areas of redness and/or skin breakdown  2.  Assess vascular access sites hourly  1/13/2025 2352 by Reta Henson RN  Outcome: Progressing  Note: Encouraged pt to turn from side to side while lying in bed and to request assistance if needed.  Pt verbalized understanding.      Problem: Skin/Tissue Integrity - Adult  Goal: Skin integrity remains intact  1/13/2025 2352 by Reta Henson RN  Outcome: Progressing     Problem: Musculoskeletal - Adult  Goal: Return mobility to safest level of function  1/13/2025 2352 by Reta Henson RN  Outcome: Progressing     Problem: Gastrointestinal - Adult  Goal: Maintains adequate nutritional intake  1/13/2025 2352 by Reta Henson RN  Outcome: Progressing     Problem: Nutrition Deficit:  Goal: Optimize nutritional status  Outcome: Progressing

## 2025-01-14 NOTE — PLAN OF CARE
Problem: Discharge Planning  Goal: Discharge to home or other facility with appropriate resources  1/14/2025 1013 by Roberta Gaston RN  Outcome: Progressing  1/13/2025 2352 by Reta Henson RN  Outcome: Progressing     Problem: Pain  Goal: Verbalizes/displays adequate comfort level or baseline comfort level  1/14/2025 1013 by Roberta Gaston RN  Outcome: Progressing  1/13/2025 2352 by Reta Henson RN  Outcome: Progressing  Flowsheets (Taken 1/13/2025 2352)  Verbalizes/displays adequate comfort level or baseline comfort level:   Encourage patient to monitor pain and request assistance   Assess pain using appropriate pain scale   Administer analgesics based on type and severity of pain and evaluate response   Implement non-pharmacological measures as appropriate and evaluate response     Problem: Safety - Adult  Goal: Free from fall injury  1/14/2025 1013 by Roberta Gaston RN  Outcome: Progressing  1/13/2025 2352 by Reta Henson RN  Outcome: Progressing     Problem: ABCDS Injury Assessment  Goal: Absence of physical injury  1/14/2025 1013 by Roberta Gaston RN  Outcome: Progressing  1/13/2025 2352 by Reta Henson RN  Outcome: Progressing     Problem: Skin/Tissue Integrity  Goal: Absence of new skin breakdown  Description: 1.  Monitor for areas of redness and/or skin breakdown  2.  Assess vascular access sites hourly  3.  Every 4-6 hours minimum:  Change oxygen saturation probe site  4.  Every 4-6 hours:  If on nasal continuous positive airway pressure, respiratory therapy assess nares and determine need for appliance change or resting period.  1/14/2025 1013 by Roberta Gaston RN  Outcome: Progressing  1/13/2025 2352 by Reta Henson RN  Outcome: Progressing  Note: Encouraged pt to turn from side to side while lying in bed and to request assistance if needed.  Pt verbalized understanding.      Problem: Neurosensory - Adult  Goal: Achieves maximal functionality and self care  Outcome: Progressing

## 2025-01-15 ENCOUNTER — APPOINTMENT (OUTPATIENT)
Dept: CT IMAGING | Age: 75
DRG: 872 | End: 2025-01-15
Payer: MEDICARE

## 2025-01-15 ENCOUNTER — APPOINTMENT (OUTPATIENT)
Age: 75
DRG: 872 | End: 2025-01-15
Attending: INTERNAL MEDICINE
Payer: MEDICARE

## 2025-01-15 PROBLEM — R78.81 BACTEREMIA DUE TO GROUP B STREPTOCOCCUS: Status: ACTIVE | Noted: 2025-01-15

## 2025-01-15 PROBLEM — R65.10 SIRS (SYSTEMIC INFLAMMATORY RESPONSE SYNDROME) (HCC): Status: ACTIVE | Noted: 2025-01-15

## 2025-01-15 PROBLEM — B95.1 BACTEREMIA DUE TO GROUP B STREPTOCOCCUS: Status: ACTIVE | Noted: 2025-01-15

## 2025-01-15 LAB
ALBUMIN SERPL-MCNC: 2.9 G/DL (ref 3.4–5)
ALP SERPL-CCNC: 78 U/L (ref 40–129)
ALT SERPL-CCNC: 21 U/L (ref 10–40)
ANION GAP SERPL CALCULATED.3IONS-SCNC: 9 MMOL/L (ref 3–16)
AST SERPL-CCNC: 28 U/L (ref 15–37)
BILIRUB DIRECT SERPL-MCNC: 0.4 MG/DL (ref 0–0.3)
BILIRUB INDIRECT SERPL-MCNC: 0.3 MG/DL (ref 0–1)
BILIRUB SERPL-MCNC: 0.7 MG/DL (ref 0–1)
BUN SERPL-MCNC: 15 MG/DL (ref 7–20)
CALCIUM SERPL-MCNC: 9.4 MG/DL (ref 8.3–10.6)
CHLORIDE SERPL-SCNC: 100 MMOL/L (ref 99–110)
CO2 SERPL-SCNC: 30 MMOL/L (ref 21–32)
CREAT SERPL-MCNC: 0.5 MG/DL (ref 0.8–1.3)
DEPRECATED RDW RBC AUTO: 14.4 % (ref 12.4–15.4)
GFR SERPLBLD CREATININE-BSD FMLA CKD-EPI: >90 ML/MIN/{1.73_M2}
GLUCOSE SERPL-MCNC: 101 MG/DL (ref 70–99)
HCT VFR BLD AUTO: 40 % (ref 40.5–52.5)
HGB BLD-MCNC: 13.5 G/DL (ref 13.5–17.5)
MCH RBC QN AUTO: 32 PG (ref 26–34)
MCHC RBC AUTO-ENTMCNC: 33.7 G/DL (ref 31–36)
MCV RBC AUTO: 95.1 FL (ref 80–100)
PHOSPHATE SERPL-MCNC: 2.6 MG/DL (ref 2.5–4.9)
PLATELET # BLD AUTO: 290 K/UL (ref 135–450)
PMV BLD AUTO: 7.3 FL (ref 5–10.5)
POTASSIUM SERPL-SCNC: 3.1 MMOL/L (ref 3.5–5.1)
PROT SERPL-MCNC: 6 G/DL (ref 6.4–8.2)
RBC # BLD AUTO: 4.21 M/UL (ref 4.2–5.9)
SODIUM SERPL-SCNC: 139 MMOL/L (ref 136–145)
WBC # BLD AUTO: 11.7 K/UL (ref 4–11)

## 2025-01-15 PROCEDURE — 36415 COLL VENOUS BLD VENIPUNCTURE: CPT

## 2025-01-15 PROCEDURE — 97530 THERAPEUTIC ACTIVITIES: CPT

## 2025-01-15 PROCEDURE — 6370000000 HC RX 637 (ALT 250 FOR IP): Performed by: INTERNAL MEDICINE

## 2025-01-15 PROCEDURE — 80076 HEPATIC FUNCTION PANEL: CPT

## 2025-01-15 PROCEDURE — 85027 COMPLETE CBC AUTOMATED: CPT

## 2025-01-15 PROCEDURE — 2500000003 HC RX 250 WO HCPCS: Performed by: INTERNAL MEDICINE

## 2025-01-15 PROCEDURE — 6360000004 HC RX CONTRAST MEDICATION: Performed by: INTERNAL MEDICINE

## 2025-01-15 PROCEDURE — 80069 RENAL FUNCTION PANEL: CPT

## 2025-01-15 PROCEDURE — 99223 1ST HOSP IP/OBS HIGH 75: CPT | Performed by: INTERNAL MEDICINE

## 2025-01-15 PROCEDURE — 6360000002 HC RX W HCPCS: Performed by: INTERNAL MEDICINE

## 2025-01-15 PROCEDURE — 71260 CT THORAX DX C+: CPT

## 2025-01-15 PROCEDURE — 97110 THERAPEUTIC EXERCISES: CPT

## 2025-01-15 PROCEDURE — 1200000000 HC SEMI PRIVATE

## 2025-01-15 RX ORDER — DIPHENHYDRAMINE HYDROCHLORIDE 50 MG/ML
25 INJECTION INTRAMUSCULAR; INTRAVENOUS EVERY 6 HOURS PRN
Status: DISCONTINUED | OUTPATIENT
Start: 2025-01-15 | End: 2025-01-20 | Stop reason: HOSPADM

## 2025-01-15 RX ORDER — IOPAMIDOL 755 MG/ML
75 INJECTION, SOLUTION INTRAVASCULAR
Status: COMPLETED | OUTPATIENT
Start: 2025-01-15 | End: 2025-01-15

## 2025-01-15 RX ADMIN — POTASSIUM BICARBONATE 40 MEQ: 782 TABLET, EFFERVESCENT ORAL at 20:00

## 2025-01-15 RX ADMIN — OXYCODONE 5 MG: 5 TABLET ORAL at 18:48

## 2025-01-15 RX ADMIN — OXYCODONE 10 MG: 5 TABLET ORAL at 08:27

## 2025-01-15 RX ADMIN — ENOXAPARIN SODIUM 40 MG: 100 INJECTION SUBCUTANEOUS at 08:27

## 2025-01-15 RX ADMIN — IOPAMIDOL 75 ML: 755 INJECTION, SOLUTION INTRAVENOUS at 14:39

## 2025-01-15 RX ADMIN — AZITHROMYCIN 250 MG: 250 TABLET, FILM COATED ORAL at 08:28

## 2025-01-15 RX ADMIN — METOPROLOL SUCCINATE 25 MG: 25 TABLET, EXTENDED RELEASE ORAL at 08:28

## 2025-01-15 RX ADMIN — Medication 10 ML: at 08:28

## 2025-01-15 RX ADMIN — AMLODIPINE BESYLATE 5 MG: 5 TABLET ORAL at 08:28

## 2025-01-15 RX ADMIN — WATER 2000 MG: 1 INJECTION INTRAMUSCULAR; INTRAVENOUS; SUBCUTANEOUS at 08:27

## 2025-01-15 RX ADMIN — DIPHENHYDRAMINE HYDROCHLORIDE 25 MG: 50 INJECTION INTRAMUSCULAR; INTRAVENOUS at 14:06

## 2025-01-15 RX ADMIN — POTASSIUM BICARBONATE 40 MEQ: 782 TABLET, EFFERVESCENT ORAL at 10:11

## 2025-01-15 RX ADMIN — Medication 10 ML: at 20:02

## 2025-01-15 ASSESSMENT — PAIN DESCRIPTION - DESCRIPTORS: DESCRIPTORS: ACHING

## 2025-01-15 ASSESSMENT — PAIN SCALES - GENERAL
PAINLEVEL_OUTOF10: 5
PAINLEVEL_OUTOF10: 3
PAINLEVEL_OUTOF10: 0
PAINLEVEL_OUTOF10: 4
PAINLEVEL_OUTOF10: 9

## 2025-01-15 ASSESSMENT — PAIN - FUNCTIONAL ASSESSMENT: PAIN_FUNCTIONAL_ASSESSMENT: PREVENTS OR INTERFERES SOME ACTIVE ACTIVITIES AND ADLS

## 2025-01-15 ASSESSMENT — PAIN DESCRIPTION - ORIENTATION: ORIENTATION: LEFT

## 2025-01-15 NOTE — PLAN OF CARE
Drink Ensures that are provided.     Problem: Nutrition Deficit:  Goal: Optimize nutritional status  Outcome: Progressing

## 2025-01-15 NOTE — CONSULTS
Infectious Diseases   Consult Note      Reason for Consult:  strep bacteremia    Requesting Physician:  Kevin      Date of Admission: 1/11/2025    Subjective:   CHIEF COMPLAINT:  none given       HPI:     Jerome Maria is a 74yoM with history of HTN   History of cancer of tongue s/p resection/radical neck followed by XRT/chemo.  Osteoradionecrosis of mandible   Appears he was lost to ENT follow-up   Speech impediment   Edentulous   Former smoker                 ED 1/11/25 with weakness, fell 2d earlier.  High grade fever present on arrival.   WBC was 16, lactic 2, procal 3    Both sets of BC collected on admission are positive for GpBS  ID is consulted for managmenet     Continued report of L shoulder pain   Today he was noted to have focal inflammatory change at the L SCJ  ID is consulted for evaluation and management        Current abx:  Rocephin 2g q24    Azithro 1/12-1/15       Past Surgical History:       Diagnosis Date    Cancer (HCC)     oral sqaumous cell carcinoma 2010    Radiation     S/P chemotherapy, time since greater than 12 weeks          Procedure Laterality Date    EYE SURGERY Right 12/26/13    cataract    OTHER SURGICAL HISTORY      jaw cancer    UPPER GASTROINTESTINAL ENDOSCOPY N/A 7/26/2021    EGD BIOPSY performed by June Bryant MD at [facility] ENDOSCOPY         Social History:    TOBACCO:   reports that he quit smoking about 12 years ago. His smoking use included cigarettes. He started smoking about 52 years ago. He has never used smokeless tobacco.  ETOH:   reports that he does not currently use alcohol after a past usage of about 6.0 standard drinks of alcohol per week.  There is no history of illicit drug use or other significant epidemiologic exposures.      Family History:   History reviewed. No pertinent family history.  There is no family history of autoimmune diseases or significant infectious diseases.      Current Medications:    Current Facility-Administered Medications: potassium

## 2025-01-15 NOTE — PLAN OF CARE
Problem: Discharge Planning  Goal: Discharge to home or other facility with appropriate resources  1/15/2025 1226 by Roberta Gaston RN  Outcome: Progressing  1/15/2025 0049 by Jeanne Olivares RN  Outcome: Progressing     Problem: Pain  Goal: Verbalizes/displays adequate comfort level or baseline comfort level  1/15/2025 1226 by Roberta Gaston RN  Outcome: Progressing  1/15/2025 0049 by Jeanne Olivares RN  Outcome: Progressing     Problem: Safety - Adult  Goal: Free from fall injury  1/15/2025 1226 by Roberta Gaston RN  Outcome: Progressing  1/15/2025 0049 by Jeanne Olivares RN  Outcome: Progressing     Problem: ABCDS Injury Assessment  Goal: Absence of physical injury  1/15/2025 1226 by Roberta Gaston RN  Outcome: Progressing  1/15/2025 0049 by Jeanne Olivares RN  Outcome: Progressing     Problem: Skin/Tissue Integrity  Goal: Absence of new skin breakdown  Description: 1.  Monitor for areas of redness and/or skin breakdown  2.  Assess vascular access sites hourly  3.  Every 4-6 hours minimum:  Change oxygen saturation probe site  4.  Every 4-6 hours:  If on nasal continuous positive airway pressure, respiratory therapy assess nares and determine need for appliance change or resting period.  1/15/2025 1226 by Roberta Gaston RN  Outcome: Progressing  1/15/2025 0049 by Jeanne Olivares RN  Outcome: Progressing     Problem: Neurosensory - Adult  Goal: Achieves maximal functionality and self care  1/15/2025 1226 by Roberta Gaston RN  Outcome: Progressing  1/15/2025 0049 by Jeanne Olivares RN  Outcome: Progressing     Problem: Skin/Tissue Integrity - Adult  Goal: Skin integrity remains intact  1/15/2025 1226 by Roberta Gaston RN  Outcome: Progressing  1/15/2025 0049 by Jeanne Olivares RN  Outcome: Progressing     Problem: Musculoskeletal - Adult  Goal: Return mobility to safest level of function  1/15/2025 1226 by Roberta Gaston RN  Outcome: Progressing  1/15/2025 0049 by Jeanne Olivares RN  Outcome:

## 2025-01-16 ENCOUNTER — APPOINTMENT (OUTPATIENT)
Age: 75
DRG: 872 | End: 2025-01-16
Attending: INTERNAL MEDICINE
Payer: MEDICARE

## 2025-01-16 LAB
ALBUMIN SERPL-MCNC: 3.1 G/DL (ref 3.4–5)
ANION GAP SERPL CALCULATED.3IONS-SCNC: 10 MMOL/L (ref 3–16)
BUN SERPL-MCNC: 17 MG/DL (ref 7–20)
CALCIUM SERPL-MCNC: 9.5 MG/DL (ref 8.3–10.6)
CHLORIDE SERPL-SCNC: 101 MMOL/L (ref 99–110)
CO2 SERPL-SCNC: 32 MMOL/L (ref 21–32)
CREAT SERPL-MCNC: 0.7 MG/DL (ref 0.8–1.3)
CRP SERPL-MCNC: 242 MG/L (ref 0–5.1)
DEPRECATED RDW RBC AUTO: 14.2 % (ref 12.4–15.4)
ECHO BSA: 2.01 M2
ECHO LV EF PHYSICIAN: 52 %
EST. AVERAGE GLUCOSE BLD GHB EST-MCNC: 99.7 MG/DL
GFR SERPLBLD CREATININE-BSD FMLA CKD-EPI: >90 ML/MIN/{1.73_M2}
GLUCOSE SERPL-MCNC: 107 MG/DL (ref 70–99)
HBA1C MFR BLD: 5.1 %
HCT VFR BLD AUTO: 40.9 % (ref 40.5–52.5)
HGB BLD-MCNC: 13.6 G/DL (ref 13.5–17.5)
MAGNESIUM SERPL-MCNC: 2.55 MG/DL (ref 1.8–2.4)
MCH RBC QN AUTO: 31.7 PG (ref 26–34)
MCHC RBC AUTO-ENTMCNC: 33.2 G/DL (ref 31–36)
MCV RBC AUTO: 95.4 FL (ref 80–100)
PHOSPHATE SERPL-MCNC: 3 MG/DL (ref 2.5–4.9)
PLATELET # BLD AUTO: 365 K/UL (ref 135–450)
PMV BLD AUTO: 7 FL (ref 5–10.5)
POTASSIUM SERPL-SCNC: 3.5 MMOL/L (ref 3.5–5.1)
RBC # BLD AUTO: 4.29 M/UL (ref 4.2–5.9)
SODIUM SERPL-SCNC: 143 MMOL/L (ref 136–145)
WBC # BLD AUTO: 13.7 K/UL (ref 4–11)

## 2025-01-16 PROCEDURE — 85027 COMPLETE CBC AUTOMATED: CPT

## 2025-01-16 PROCEDURE — 36415 COLL VENOUS BLD VENIPUNCTURE: CPT

## 2025-01-16 PROCEDURE — 80069 RENAL FUNCTION PANEL: CPT

## 2025-01-16 PROCEDURE — 1200000000 HC SEMI PRIVATE

## 2025-01-16 PROCEDURE — 83036 HEMOGLOBIN GLYCOSYLATED A1C: CPT

## 2025-01-16 PROCEDURE — 93308 TTE F-UP OR LMTD: CPT

## 2025-01-16 PROCEDURE — 6360000002 HC RX W HCPCS: Performed by: INTERNAL MEDICINE

## 2025-01-16 PROCEDURE — 97535 SELF CARE MNGMENT TRAINING: CPT

## 2025-01-16 PROCEDURE — 99233 SBSQ HOSP IP/OBS HIGH 50: CPT | Performed by: INTERNAL MEDICINE

## 2025-01-16 PROCEDURE — 83735 ASSAY OF MAGNESIUM: CPT

## 2025-01-16 PROCEDURE — 97530 THERAPEUTIC ACTIVITIES: CPT

## 2025-01-16 PROCEDURE — 2580000003 HC RX 258: Performed by: INTERNAL MEDICINE

## 2025-01-16 PROCEDURE — 93325 DOPPLER ECHO COLOR FLOW MAPG: CPT | Performed by: INTERNAL MEDICINE

## 2025-01-16 PROCEDURE — 93308 TTE F-UP OR LMTD: CPT | Performed by: INTERNAL MEDICINE

## 2025-01-16 PROCEDURE — 6370000000 HC RX 637 (ALT 250 FOR IP): Performed by: INTERNAL MEDICINE

## 2025-01-16 PROCEDURE — 2500000003 HC RX 250 WO HCPCS: Performed by: INTERNAL MEDICINE

## 2025-01-16 PROCEDURE — 87040 BLOOD CULTURE FOR BACTERIA: CPT

## 2025-01-16 PROCEDURE — 86140 C-REACTIVE PROTEIN: CPT

## 2025-01-16 PROCEDURE — 97110 THERAPEUTIC EXERCISES: CPT

## 2025-01-16 RX ADMIN — DAPTOMYCIN 450 MG: 500 INJECTION, POWDER, LYOPHILIZED, FOR SOLUTION INTRAVENOUS at 15:12

## 2025-01-16 RX ADMIN — Medication 10 ML: at 08:57

## 2025-01-16 RX ADMIN — ENOXAPARIN SODIUM 40 MG: 100 INJECTION SUBCUTANEOUS at 08:56

## 2025-01-16 RX ADMIN — AMLODIPINE BESYLATE 5 MG: 5 TABLET ORAL at 08:56

## 2025-01-16 RX ADMIN — WATER 2000 MG: 1 INJECTION INTRAMUSCULAR; INTRAVENOUS; SUBCUTANEOUS at 08:56

## 2025-01-16 RX ADMIN — METOPROLOL SUCCINATE 25 MG: 25 TABLET, EXTENDED RELEASE ORAL at 08:56

## 2025-01-16 RX ADMIN — Medication 10 ML: at 19:56

## 2025-01-16 NOTE — PLAN OF CARE
Problem: Discharge Planning  Goal: Discharge to home or other facility with appropriate resources  1/15/2025 2153 by Charity De La Cruz RN  Outcome: Progressing  Flowsheets (Taken 1/15/2025 2000)  Discharge to home or other facility with appropriate resources:   Identify barriers to discharge with patient and caregiver   Arrange for needed discharge resources and transportation as appropriate  1/15/2025 1226 by Roberta Gaston RN  Outcome: Progressing     Problem: Pain  Goal: Verbalizes/displays adequate comfort level or baseline comfort level  1/15/2025 2153 by Charity De La Cruz RN  Outcome: Progressing  1/15/2025 1226 by Roberta Gaston RN  Outcome: Progressing     Problem: Safety - Adult  Goal: Free from fall injury  1/15/2025 2153 by Chraity De La Cruz RN  Outcome: Progressing  1/15/2025 1226 by Roberta Gaston RN  Outcome: Progressing     Problem: ABCDS Injury Assessment  Goal: Absence of physical injury  1/15/2025 2153 by Charity De La Cruz RN  Outcome: Progressing  Flowsheets (Taken 1/15/2025 2000)  Absence of Physical Injury: Implement safety measures based on patient assessment  1/15/2025 1226 by Roberta Gaston RN  Outcome: Progressing     Problem: Skin/Tissue Integrity  Goal: Absence of new skin breakdown  Description: 1.  Monitor for areas of redness and/or skin breakdown  2.  Assess vascular access sites hourly  3.  Every 4-6 hours minimum:  Change oxygen saturation probe site  4.  Every 4-6 hours:  If on nasal continuous positive airway pressure, respiratory therapy assess nares and determine need for appliance change or resting period.  1/15/2025 2153 by Charity De La Cruz RN  Outcome: Progressing  1/15/2025 1226 by Roberta Gaston RN  Outcome: Progressing     Problem: Neurosensory - Adult  Goal: Achieves maximal functionality and self care  1/15/2025 2153 by Charity De La Cruz RN  Outcome: Progressing  1/15/2025 1226 by Roberta Gaston RN  Outcome: Progressing     Problem: Skin/Tissue Integrity -

## 2025-01-16 NOTE — PLAN OF CARE
Problem: Skin/Tissue Integrity - Adult  Goal: Skin integrity remains intact  Outcome: Progressing

## 2025-01-17 LAB
ALBUMIN SERPL-MCNC: 2.1 G/DL (ref 3.4–5)
ANION GAP SERPL CALCULATED.3IONS-SCNC: 10 MMOL/L (ref 3–16)
BASOPHILS # BLD: 0 K/UL (ref 0–0.2)
BASOPHILS NFR BLD: 0.2 %
BUN SERPL-MCNC: 16 MG/DL (ref 7–20)
CALCIUM SERPL-MCNC: 8.7 MG/DL (ref 8.3–10.6)
CHLORIDE SERPL-SCNC: 102 MMOL/L (ref 99–110)
CK SERPL-CCNC: 106 U/L (ref 39–308)
CO2 SERPL-SCNC: 28 MMOL/L (ref 21–32)
CREAT SERPL-MCNC: 0.6 MG/DL (ref 0.8–1.3)
DEPRECATED RDW RBC AUTO: 14.1 % (ref 12.4–15.4)
EOSINOPHIL # BLD: 0.6 K/UL (ref 0–0.6)
EOSINOPHIL NFR BLD: 4.9 %
GFR SERPLBLD CREATININE-BSD FMLA CKD-EPI: >90 ML/MIN/{1.73_M2}
GLUCOSE SERPL-MCNC: 90 MG/DL (ref 70–99)
HCT VFR BLD AUTO: 43.6 % (ref 40.5–52.5)
HGB BLD-MCNC: 14.7 G/DL (ref 13.5–17.5)
LYMPHOCYTES # BLD: 1.1 K/UL (ref 1–5.1)
LYMPHOCYTES NFR BLD: 8.3 %
MCH RBC QN AUTO: 32.2 PG (ref 26–34)
MCHC RBC AUTO-ENTMCNC: 33.6 G/DL (ref 31–36)
MCV RBC AUTO: 95.8 FL (ref 80–100)
MONOCYTES # BLD: 0.6 K/UL (ref 0–1.3)
MONOCYTES NFR BLD: 4.9 %
NEUTROPHILS # BLD: 10.4 K/UL (ref 1.7–7.7)
NEUTROPHILS NFR BLD: 81.7 %
PHOSPHATE SERPL-MCNC: 2.5 MG/DL (ref 2.5–4.9)
PLATELET # BLD AUTO: 324 K/UL (ref 135–450)
PMV BLD AUTO: 7.1 FL (ref 5–10.5)
POTASSIUM SERPL-SCNC: 3.3 MMOL/L (ref 3.5–5.1)
RBC # BLD AUTO: 4.55 M/UL (ref 4.2–5.9)
SODIUM SERPL-SCNC: 140 MMOL/L (ref 136–145)
WBC # BLD AUTO: 12.7 K/UL (ref 4–11)

## 2025-01-17 PROCEDURE — 36415 COLL VENOUS BLD VENIPUNCTURE: CPT

## 2025-01-17 PROCEDURE — 6360000002 HC RX W HCPCS: Performed by: INTERNAL MEDICINE

## 2025-01-17 PROCEDURE — 2580000003 HC RX 258: Performed by: INTERNAL MEDICINE

## 2025-01-17 PROCEDURE — 85025 COMPLETE CBC W/AUTO DIFF WBC: CPT

## 2025-01-17 PROCEDURE — 80069 RENAL FUNCTION PANEL: CPT

## 2025-01-17 PROCEDURE — 6370000000 HC RX 637 (ALT 250 FOR IP): Performed by: INTERNAL MEDICINE

## 2025-01-17 PROCEDURE — 82550 ASSAY OF CK (CPK): CPT

## 2025-01-17 PROCEDURE — 2500000003 HC RX 250 WO HCPCS: Performed by: INTERNAL MEDICINE

## 2025-01-17 PROCEDURE — 99232 SBSQ HOSP IP/OBS MODERATE 35: CPT | Performed by: INTERNAL MEDICINE

## 2025-01-17 PROCEDURE — 1200000000 HC SEMI PRIVATE

## 2025-01-17 RX ADMIN — METOPROLOL SUCCINATE 25 MG: 25 TABLET, EXTENDED RELEASE ORAL at 08:22

## 2025-01-17 RX ADMIN — Medication 10 ML: at 08:22

## 2025-01-17 RX ADMIN — AMLODIPINE BESYLATE 5 MG: 5 TABLET ORAL at 08:22

## 2025-01-17 RX ADMIN — ENOXAPARIN SODIUM 40 MG: 100 INJECTION SUBCUTANEOUS at 08:22

## 2025-01-17 RX ADMIN — Medication 10 ML: at 21:41

## 2025-01-17 RX ADMIN — POTASSIUM BICARBONATE 40 MEQ: 782 TABLET, EFFERVESCENT ORAL at 21:36

## 2025-01-17 RX ADMIN — POTASSIUM BICARBONATE 40 MEQ: 782 TABLET, EFFERVESCENT ORAL at 08:28

## 2025-01-17 RX ADMIN — DAPTOMYCIN 450 MG: 500 INJECTION, POWDER, LYOPHILIZED, FOR SOLUTION INTRAVENOUS at 12:00

## 2025-01-17 NOTE — PLAN OF CARE
Problem: Discharge Planning  Goal: Discharge to home or other facility with appropriate resources  Outcome: Progressing  Flowsheets (Taken 1/16/2025 1957 by Charity De La Cruz, RN)  Discharge to home or other facility with appropriate resources:   Identify barriers to discharge with patient and caregiver   Arrange for needed discharge resources and transportation as appropriate     Problem: Pain  Goal: Verbalizes/displays adequate comfort level or baseline comfort level  Outcome: Progressing  Flowsheets (Taken 1/13/2025 3332 by Reta Henson, RN)  Verbalizes/displays adequate comfort level or baseline comfort level:   Encourage patient to monitor pain and request assistance   Assess pain using appropriate pain scale   Administer analgesics based on type and severity of pain and evaluate response   Implement non-pharmacological measures as appropriate and evaluate response     Problem: Skin/Tissue Integrity  Goal: Absence of new skin breakdown  Description: 1.  Monitor for areas of redness and/or skin breakdown  2.  Assess vascular access sites hourly  3.  Every 4-6 hours minimum:  Change oxygen saturation probe site  4.  Every 4-6 hours:  If on nasal continuous positive airway pressure, respiratory therapy assess nares and determine need for appliance change or resting period.  Outcome: Progressing     Problem: Nutrition Deficit:  Goal: Optimize nutritional status  Flowsheets (Taken 1/17/2025 4641)  Nutrient intake appropriate for improving, restoring, or maintaining nutritional needs:   Assess nutritional status and recommend course of action   Monitor oral intake, labs, and treatment plans   Recommend appropriate diets, oral nutritional supplements, and vitamin/mineral supplements   Flip Watson, RN  1/17/2025

## 2025-01-17 NOTE — PLAN OF CARE
Problem: Discharge Planning  Goal: Discharge to home or other facility with appropriate resources  Outcome: Progressing  Flowsheets (Taken 1/16/2025 1957)  Discharge to home or other facility with appropriate resources:   Identify barriers to discharge with patient and caregiver   Arrange for needed discharge resources and transportation as appropriate     Problem: Pain  Goal: Verbalizes/displays adequate comfort level or baseline comfort level  Outcome: Progressing     Problem: Safety - Adult  Goal: Free from fall injury  Outcome: Progressing     Problem: ABCDS Injury Assessment  Goal: Absence of physical injury  Outcome: Progressing  Flowsheets (Taken 1/16/2025 1957)  Absence of Physical Injury: Implement safety measures based on patient assessment     Problem: Skin/Tissue Integrity  Goal: Absence of new skin breakdown  Description: 1.  Monitor for areas of redness and/or skin breakdown  2.  Assess vascular access sites hourly  3.  Every 4-6 hours minimum:  Change oxygen saturation probe site  4.  Every 4-6 hours:  If on nasal continuous positive airway pressure, respiratory therapy assess nares and determine need for appliance change or resting period.  Outcome: Progressing     Problem: Neurosensory - Adult  Goal: Achieves maximal functionality and self care  Outcome: Progressing     Problem: Skin/Tissue Integrity - Adult  Goal: Skin integrity remains intact  1/16/2025 2157 by Charity De La Cruz, RN  Outcome: Progressing  Flowsheets (Taken 1/16/2025 1957)  Skin Integrity Remains Intact:   Monitor for areas of redness and/or skin breakdown   Assess vascular access sites hourly  1/16/2025 1815 by Roberta Livingston, RN  Outcome: Progressing     Problem: Musculoskeletal - Adult  Goal: Return mobility to safest level of function  Outcome: Progressing     Problem: Gastrointestinal - Adult  Goal: Maintains adequate nutritional intake  Outcome: Progressing     Problem: Genitourinary - Adult  Goal: Absence of urinary

## 2025-01-18 LAB
ALBUMIN SERPL-MCNC: 3.1 G/DL (ref 3.4–5)
ANION GAP SERPL CALCULATED.3IONS-SCNC: 9 MMOL/L (ref 3–16)
BUN SERPL-MCNC: 15 MG/DL (ref 7–20)
CALCIUM SERPL-MCNC: 9.1 MG/DL (ref 8.3–10.6)
CHLORIDE SERPL-SCNC: 104 MMOL/L (ref 99–110)
CO2 SERPL-SCNC: 29 MMOL/L (ref 21–32)
CREAT SERPL-MCNC: 0.6 MG/DL (ref 0.8–1.3)
DEPRECATED RDW RBC AUTO: 13.7 % (ref 12.4–15.4)
GFR SERPLBLD CREATININE-BSD FMLA CKD-EPI: >90 ML/MIN/{1.73_M2}
GLUCOSE SERPL-MCNC: 97 MG/DL (ref 70–99)
HCT VFR BLD AUTO: 37.6 % (ref 40.5–52.5)
HGB BLD-MCNC: 13.3 G/DL (ref 13.5–17.5)
MAGNESIUM SERPL-MCNC: 2.36 MG/DL (ref 1.8–2.4)
MCH RBC QN AUTO: 33.2 PG (ref 26–34)
MCHC RBC AUTO-ENTMCNC: 35.3 G/DL (ref 31–36)
MCV RBC AUTO: 94.2 FL (ref 80–100)
PHOSPHATE SERPL-MCNC: 3 MG/DL (ref 2.5–4.9)
PLATELET # BLD AUTO: 381 K/UL (ref 135–450)
PMV BLD AUTO: 7 FL (ref 5–10.5)
POTASSIUM SERPL-SCNC: 3.2 MMOL/L (ref 3.5–5.1)
RBC # BLD AUTO: 4 M/UL (ref 4.2–5.9)
SODIUM SERPL-SCNC: 142 MMOL/L (ref 136–145)
WBC # BLD AUTO: 10.8 K/UL (ref 4–11)

## 2025-01-18 PROCEDURE — 2580000003 HC RX 258: Performed by: INTERNAL MEDICINE

## 2025-01-18 PROCEDURE — 1200000000 HC SEMI PRIVATE

## 2025-01-18 PROCEDURE — 92610 EVALUATE SWALLOWING FUNCTION: CPT

## 2025-01-18 PROCEDURE — 80069 RENAL FUNCTION PANEL: CPT

## 2025-01-18 PROCEDURE — 85027 COMPLETE CBC AUTOMATED: CPT

## 2025-01-18 PROCEDURE — 6360000002 HC RX W HCPCS: Performed by: INTERNAL MEDICINE

## 2025-01-18 PROCEDURE — 83735 ASSAY OF MAGNESIUM: CPT

## 2025-01-18 PROCEDURE — 2500000003 HC RX 250 WO HCPCS: Performed by: INTERNAL MEDICINE

## 2025-01-18 PROCEDURE — 6370000000 HC RX 637 (ALT 250 FOR IP): Performed by: INTERNAL MEDICINE

## 2025-01-18 PROCEDURE — 6370000000 HC RX 637 (ALT 250 FOR IP)

## 2025-01-18 RX ADMIN — SODIUM CHLORIDE 50 ML/HR: 9 INJECTION, SOLUTION INTRAVENOUS at 12:29

## 2025-01-18 RX ADMIN — POTASSIUM BICARBONATE 40 MEQ: 782 TABLET, EFFERVESCENT ORAL at 20:20

## 2025-01-18 RX ADMIN — DAPTOMYCIN 450 MG: 500 INJECTION, POWDER, LYOPHILIZED, FOR SOLUTION INTRAVENOUS at 12:34

## 2025-01-18 RX ADMIN — METOPROLOL SUCCINATE 25 MG: 25 TABLET, EXTENDED RELEASE ORAL at 08:26

## 2025-01-18 RX ADMIN — Medication 10 ML: at 08:27

## 2025-01-18 RX ADMIN — OXYCODONE 5 MG: 5 TABLET ORAL at 10:17

## 2025-01-18 RX ADMIN — AMLODIPINE BESYLATE 5 MG: 5 TABLET ORAL at 08:26

## 2025-01-18 RX ADMIN — Medication 10 ML: at 20:20

## 2025-01-18 RX ADMIN — POTASSIUM BICARBONATE 40 MEQ: 782 TABLET, EFFERVESCENT ORAL at 10:17

## 2025-01-18 RX ADMIN — OXYCODONE 5 MG: 5 TABLET ORAL at 17:41

## 2025-01-18 RX ADMIN — ENOXAPARIN SODIUM 40 MG: 100 INJECTION SUBCUTANEOUS at 08:26

## 2025-01-18 ASSESSMENT — PAIN SCALES - GENERAL
PAINLEVEL_OUTOF10: 5
PAINLEVEL_OUTOF10: 9

## 2025-01-18 ASSESSMENT — PAIN DESCRIPTION - ORIENTATION
ORIENTATION: RIGHT;LEFT
ORIENTATION: RIGHT;LEFT

## 2025-01-18 ASSESSMENT — PAIN DESCRIPTION - DESCRIPTORS
DESCRIPTORS: ACHING
DESCRIPTORS: ACHING

## 2025-01-18 ASSESSMENT — PAIN DESCRIPTION - LOCATION
LOCATION: LEG
LOCATION: LEG

## 2025-01-18 NOTE — PLAN OF CARE
Bedside swallow evaluation completed.    Anisha Madison M.A. CCC-SLP  Speech Language Pathologist

## 2025-01-19 LAB
ALBUMIN SERPL-MCNC: 3.3 G/DL (ref 3.4–5)
ALBUMIN/GLOB SERPL: 1.1 {RATIO} (ref 1.1–2.2)
ALP SERPL-CCNC: 76 U/L (ref 40–129)
ALT SERPL-CCNC: 27 U/L (ref 10–40)
ANION GAP SERPL CALCULATED.3IONS-SCNC: 9 MMOL/L (ref 3–16)
AST SERPL-CCNC: 30 U/L (ref 15–37)
BASOPHILS # BLD: 0.1 K/UL (ref 0–0.2)
BASOPHILS NFR BLD: 0.8 %
BILIRUB SERPL-MCNC: 0.7 MG/DL (ref 0–1)
BUN SERPL-MCNC: 16 MG/DL (ref 7–20)
CALCIUM SERPL-MCNC: 9.3 MG/DL (ref 8.3–10.6)
CHLORIDE SERPL-SCNC: 103 MMOL/L (ref 99–110)
CO2 SERPL-SCNC: 31 MMOL/L (ref 21–32)
CREAT SERPL-MCNC: 0.7 MG/DL (ref 0.8–1.3)
DEPRECATED RDW RBC AUTO: 13.7 % (ref 12.4–15.4)
EOSINOPHIL # BLD: 0.4 K/UL (ref 0–0.6)
EOSINOPHIL NFR BLD: 3.6 %
GFR SERPLBLD CREATININE-BSD FMLA CKD-EPI: >90 ML/MIN/{1.73_M2}
GLUCOSE SERPL-MCNC: 91 MG/DL (ref 70–99)
HCT VFR BLD AUTO: 38.5 % (ref 40.5–52.5)
HGB BLD-MCNC: 13.1 G/DL (ref 13.5–17.5)
LYMPHOCYTES # BLD: 1.9 K/UL (ref 1–5.1)
LYMPHOCYTES NFR BLD: 19.3 %
MCH RBC QN AUTO: 32.6 PG (ref 26–34)
MCHC RBC AUTO-ENTMCNC: 34.1 G/DL (ref 31–36)
MCV RBC AUTO: 95.5 FL (ref 80–100)
MONOCYTES # BLD: 0.5 K/UL (ref 0–1.3)
MONOCYTES NFR BLD: 4.8 %
NEUTROPHILS # BLD: 7.1 K/UL (ref 1.7–7.7)
NEUTROPHILS NFR BLD: 71.5 %
PHOSPHATE SERPL-MCNC: 3.9 MG/DL (ref 2.5–4.9)
PLATELET # BLD AUTO: 450 K/UL (ref 135–450)
PMV BLD AUTO: 7.2 FL (ref 5–10.5)
POTASSIUM SERPL-SCNC: 4.2 MMOL/L (ref 3.5–5.1)
PROT SERPL-MCNC: 6.4 G/DL (ref 6.4–8.2)
RBC # BLD AUTO: 4.03 M/UL (ref 4.2–5.9)
SODIUM SERPL-SCNC: 143 MMOL/L (ref 136–145)
WBC # BLD AUTO: 9.9 K/UL (ref 4–11)

## 2025-01-19 PROCEDURE — 85025 COMPLETE CBC W/AUTO DIFF WBC: CPT

## 2025-01-19 PROCEDURE — 6360000002 HC RX W HCPCS: Performed by: INTERNAL MEDICINE

## 2025-01-19 PROCEDURE — 36415 COLL VENOUS BLD VENIPUNCTURE: CPT

## 2025-01-19 PROCEDURE — 80053 COMPREHEN METABOLIC PANEL: CPT

## 2025-01-19 PROCEDURE — 1200000000 HC SEMI PRIVATE

## 2025-01-19 PROCEDURE — 2500000003 HC RX 250 WO HCPCS: Performed by: INTERNAL MEDICINE

## 2025-01-19 PROCEDURE — 2580000003 HC RX 258: Performed by: INTERNAL MEDICINE

## 2025-01-19 PROCEDURE — 84100 ASSAY OF PHOSPHORUS: CPT

## 2025-01-19 PROCEDURE — 6370000000 HC RX 637 (ALT 250 FOR IP): Performed by: INTERNAL MEDICINE

## 2025-01-19 RX ADMIN — AMLODIPINE BESYLATE 5 MG: 5 TABLET ORAL at 11:04

## 2025-01-19 RX ADMIN — METOPROLOL SUCCINATE 25 MG: 25 TABLET, EXTENDED RELEASE ORAL at 10:57

## 2025-01-19 RX ADMIN — DAPTOMYCIN 450 MG: 500 INJECTION, POWDER, LYOPHILIZED, FOR SOLUTION INTRAVENOUS at 12:33

## 2025-01-19 RX ADMIN — SODIUM CHLORIDE 50 ML/HR: 9 INJECTION, SOLUTION INTRAVENOUS at 12:31

## 2025-01-19 RX ADMIN — OXYCODONE 10 MG: 5 TABLET ORAL at 12:33

## 2025-01-19 RX ADMIN — Medication 10 ML: at 11:05

## 2025-01-19 RX ADMIN — ENOXAPARIN SODIUM 40 MG: 100 INJECTION SUBCUTANEOUS at 10:57

## 2025-01-19 RX ADMIN — OXYCODONE 10 MG: 5 TABLET ORAL at 05:10

## 2025-01-19 RX ADMIN — OXYCODONE 10 MG: 5 TABLET ORAL at 18:28

## 2025-01-19 RX ADMIN — Medication 10 ML: at 22:07

## 2025-01-19 ASSESSMENT — PAIN DESCRIPTION - DESCRIPTORS
DESCRIPTORS: ACHING
DESCRIPTORS: ACHING

## 2025-01-19 ASSESSMENT — PAIN SCALES - GENERAL
PAINLEVEL_OUTOF10: 8
PAINLEVEL_OUTOF10: 9
PAINLEVEL_OUTOF10: 6
PAINLEVEL_OUTOF10: 7

## 2025-01-19 ASSESSMENT — PAIN - FUNCTIONAL ASSESSMENT: PAIN_FUNCTIONAL_ASSESSMENT: PREVENTS OR INTERFERES SOME ACTIVE ACTIVITIES AND ADLS

## 2025-01-19 ASSESSMENT — PAIN DESCRIPTION - LOCATION
LOCATION: LEG;NECK
LOCATION: GENERALIZED

## 2025-01-19 ASSESSMENT — PAIN DESCRIPTION - ORIENTATION: ORIENTATION: RIGHT;LEFT

## 2025-01-19 ASSESSMENT — PAIN SCALES - WONG BAKER: WONGBAKER_NUMERICALRESPONSE: NO HURT

## 2025-01-20 VITALS
BODY MASS INDEX: 24.34 KG/M2 | TEMPERATURE: 97.2 F | OXYGEN SATURATION: 94 % | RESPIRATION RATE: 18 BRPM | HEIGHT: 69 IN | WEIGHT: 164.3 LBS | HEART RATE: 101 BPM | DIASTOLIC BLOOD PRESSURE: 77 MMHG | SYSTOLIC BLOOD PRESSURE: 133 MMHG

## 2025-01-20 LAB
ALBUMIN SERPL-MCNC: 3.1 G/DL (ref 3.4–5)
ALBUMIN/GLOB SERPL: 1.1 {RATIO} (ref 1.1–2.2)
ALP SERPL-CCNC: 72 U/L (ref 40–129)
ALT SERPL-CCNC: 26 U/L (ref 10–40)
ANION GAP SERPL CALCULATED.3IONS-SCNC: 8 MMOL/L (ref 3–16)
AST SERPL-CCNC: 25 U/L (ref 15–37)
BACTERIA BLD CULT ORG #2: NORMAL
BACTERIA BLD CULT: NORMAL
BASOPHILS # BLD: 0.1 K/UL (ref 0–0.2)
BASOPHILS NFR BLD: 0.8 %
BILIRUB SERPL-MCNC: 0.5 MG/DL (ref 0–1)
BUN SERPL-MCNC: 20 MG/DL (ref 7–20)
CALCIUM SERPL-MCNC: 9.4 MG/DL (ref 8.3–10.6)
CHLORIDE SERPL-SCNC: 104 MMOL/L (ref 99–110)
CO2 SERPL-SCNC: 29 MMOL/L (ref 21–32)
CREAT SERPL-MCNC: 0.7 MG/DL (ref 0.8–1.3)
DEPRECATED RDW RBC AUTO: 13.9 % (ref 12.4–15.4)
EOSINOPHIL # BLD: 0.4 K/UL (ref 0–0.6)
EOSINOPHIL NFR BLD: 4 %
GFR SERPLBLD CREATININE-BSD FMLA CKD-EPI: >90 ML/MIN/{1.73_M2}
GLUCOSE SERPL-MCNC: 102 MG/DL (ref 70–99)
HCT VFR BLD AUTO: 35.7 % (ref 40.5–52.5)
HGB BLD-MCNC: 12.1 G/DL (ref 13.5–17.5)
LYMPHOCYTES # BLD: 2.1 K/UL (ref 1–5.1)
LYMPHOCYTES NFR BLD: 21.1 %
MCH RBC QN AUTO: 32.3 PG (ref 26–34)
MCHC RBC AUTO-ENTMCNC: 34.1 G/DL (ref 31–36)
MCV RBC AUTO: 95 FL (ref 80–100)
MONOCYTES # BLD: 0.5 K/UL (ref 0–1.3)
MONOCYTES NFR BLD: 5.5 %
NEUTROPHILS # BLD: 6.8 K/UL (ref 1.7–7.7)
NEUTROPHILS NFR BLD: 68.6 %
PLATELET # BLD AUTO: 495 K/UL (ref 135–450)
PMV BLD AUTO: 7.1 FL (ref 5–10.5)
POTASSIUM SERPL-SCNC: 3.9 MMOL/L (ref 3.5–5.1)
PROT SERPL-MCNC: 5.9 G/DL (ref 6.4–8.2)
RBC # BLD AUTO: 3.75 M/UL (ref 4.2–5.9)
SODIUM SERPL-SCNC: 141 MMOL/L (ref 136–145)
WBC # BLD AUTO: 9.9 K/UL (ref 4–11)

## 2025-01-20 PROCEDURE — 97530 THERAPEUTIC ACTIVITIES: CPT

## 2025-01-20 PROCEDURE — 6360000002 HC RX W HCPCS: Performed by: INTERNAL MEDICINE

## 2025-01-20 PROCEDURE — 2580000003 HC RX 258: Performed by: INTERNAL MEDICINE

## 2025-01-20 PROCEDURE — 99232 SBSQ HOSP IP/OBS MODERATE 35: CPT | Performed by: INTERNAL MEDICINE

## 2025-01-20 PROCEDURE — 85025 COMPLETE CBC W/AUTO DIFF WBC: CPT

## 2025-01-20 PROCEDURE — 6370000000 HC RX 637 (ALT 250 FOR IP): Performed by: INTERNAL MEDICINE

## 2025-01-20 PROCEDURE — 76937 US GUIDE VASCULAR ACCESS: CPT

## 2025-01-20 PROCEDURE — 2500000003 HC RX 250 WO HCPCS: Performed by: INTERNAL MEDICINE

## 2025-01-20 PROCEDURE — 36415 COLL VENOUS BLD VENIPUNCTURE: CPT

## 2025-01-20 PROCEDURE — 36569 INSJ PICC 5 YR+ W/O IMAGING: CPT

## 2025-01-20 PROCEDURE — 02H633Z INSERTION OF INFUSION DEVICE INTO RIGHT ATRIUM, PERCUTANEOUS APPROACH: ICD-10-PCS | Performed by: INTERNAL MEDICINE

## 2025-01-20 PROCEDURE — C1751 CATH, INF, PER/CENT/MIDLINE: HCPCS

## 2025-01-20 PROCEDURE — 92526 ORAL FUNCTION THERAPY: CPT

## 2025-01-20 PROCEDURE — 97535 SELF CARE MNGMENT TRAINING: CPT

## 2025-01-20 PROCEDURE — 80053 COMPREHEN METABOLIC PANEL: CPT

## 2025-01-20 PROCEDURE — 97110 THERAPEUTIC EXERCISES: CPT

## 2025-01-20 RX ORDER — METOPROLOL SUCCINATE 25 MG/1
25 TABLET, EXTENDED RELEASE ORAL DAILY
Qty: 30 TABLET | Refills: 3 | Status: SHIPPED | OUTPATIENT
Start: 2025-01-21

## 2025-01-20 RX ORDER — SODIUM CHLORIDE 0.9 % (FLUSH) 0.9 %
5-40 SYRINGE (ML) INJECTION EVERY 12 HOURS SCHEDULED
Status: DISCONTINUED | OUTPATIENT
Start: 2025-01-20 | End: 2025-01-20 | Stop reason: HOSPADM

## 2025-01-20 RX ORDER — POLYETHYLENE GLYCOL 3350 17 G/17G
17 POWDER, FOR SOLUTION ORAL DAILY PRN
Qty: 30 PACKET | Refills: 0 | Status: SHIPPED | OUTPATIENT
Start: 2025-01-20 | End: 2025-02-19

## 2025-01-20 RX ORDER — SODIUM CHLORIDE 9 MG/ML
INJECTION, SOLUTION INTRAVENOUS PRN
Status: DISCONTINUED | OUTPATIENT
Start: 2025-01-20 | End: 2025-01-20 | Stop reason: HOSPADM

## 2025-01-20 RX ORDER — SODIUM CHLORIDE 0.9 % (FLUSH) 0.9 %
5-40 SYRINGE (ML) INJECTION PRN
Status: DISCONTINUED | OUTPATIENT
Start: 2025-01-20 | End: 2025-01-20 | Stop reason: HOSPADM

## 2025-01-20 RX ORDER — OXYCODONE HYDROCHLORIDE 5 MG/1
5 TABLET ORAL EVERY 6 HOURS PRN
Qty: 12 TABLET | Refills: 0 | Status: SHIPPED | OUTPATIENT
Start: 2025-01-20 | End: 2025-01-23

## 2025-01-20 RX ADMIN — ACETAMINOPHEN 650 MG: 325 TABLET ORAL at 17:13

## 2025-01-20 RX ADMIN — POLYETHYLENE GLYCOL 3350 17 G: 17 POWDER, FOR SOLUTION ORAL at 06:48

## 2025-01-20 RX ADMIN — ENOXAPARIN SODIUM 40 MG: 100 INJECTION SUBCUTANEOUS at 09:07

## 2025-01-20 RX ADMIN — OXYCODONE 5 MG: 5 TABLET ORAL at 06:47

## 2025-01-20 RX ADMIN — Medication 10 ML: at 09:07

## 2025-01-20 RX ADMIN — METOPROLOL SUCCINATE 25 MG: 25 TABLET, EXTENDED RELEASE ORAL at 09:07

## 2025-01-20 RX ADMIN — AMLODIPINE BESYLATE 5 MG: 5 TABLET ORAL at 09:07

## 2025-01-20 RX ADMIN — DAPTOMYCIN 450 MG: 500 INJECTION, POWDER, LYOPHILIZED, FOR SOLUTION INTRAVENOUS at 13:22

## 2025-01-20 ASSESSMENT — PAIN DESCRIPTION - LOCATION
LOCATION: JAW
LOCATION: LEG

## 2025-01-20 ASSESSMENT — PAIN DESCRIPTION - ORIENTATION
ORIENTATION: LEFT
ORIENTATION: LEFT

## 2025-01-20 ASSESSMENT — PAIN SCALES - GENERAL
PAINLEVEL_OUTOF10: 8
PAINLEVEL_OUTOF10: 4
PAINLEVEL_OUTOF10: 0

## 2025-01-20 NOTE — PROGRESS NOTES
Infectious Disease Follow up Notes    CC :  streptococcal bacteremia and suspected infection of L SCJ      Antibiotics:  Dapto 450 q24, s 1/16    Admit Date:   1/11/2025  Hospital Day: 10    Subjective:   He remains AF on RA  He says the L shoulder is feeling better  No pruritus       Objective:     Patient Vitals for the past 8 hrs:   BP Temp Temp src Pulse Resp SpO2   01/20/25 0900 131/71 97.2 °F (36.2 °C) Axillary 95 18 95 %   01/20/25 0717 -- -- -- -- 16 --   01/20/25 0647 -- -- -- -- 16 --       EXAM:  General:   cachectic   Resting comfortably, NAD     HEENT:  tongue protrudes  OP dry   NECK:   Supple   Erythema, pain, edema L SCJ, no fluctuance - no change    LUNGS:   CTA cabrera     CV:   RRR   ABD: Soft, flat, NT     EXT: Erythematous rash has resolved         LINE:   PIV in place         1/16/25 1/16/25          Scheduled Meds:   DAPTOmycin (CUBICIN) 450 mg in sodium chloride 0.9 % 50 mL IVPB  6 mg/kg IntraVENous Q24H    amLODIPine  5 mg Oral Daily    metoprolol succinate  25 mg Oral Daily    sodium chloride flush  5-40 mL IntraVENous 2 times per day    enoxaparin  40 mg SubCUTAneous Daily       Continuous Infusions:   sodium chloride Stopped (01/19/25 1405)          Data Review:    Lab Results   Component Value Date    WBC 9.9 01/20/2025    HGB 12.1 (L) 01/20/2025    HCT 35.7 (L) 01/20/2025    MCV 95.0 01/20/2025     (H) 01/20/2025     Lab Results   Component Value Date    CREATININE 0.7 (L) 01/20/2025    BUN 20 01/20/2025     01/20/2025    K 3.9 01/20/2025     01/20/2025    CO2 29 01/20/2025       Hepatic Function Panel:   Lab Results   Component Value Date/Time    ALKPHOS 72 01/20/2025 06:20 AM    ALT 26 01/20/2025 06:20 AM    AST 25 01/20/2025 06:20 AM    BILITOT 0.5 01/20/2025 06:20 AM    BILIDIR 0.4 01/15/2025 04:41 AM    IBILI 0.3 01/15/2025 04:41 AM       CRP   Date Value Ref Range Status 
                                                                                Infectious Disease Follow up Notes    CC :  streptococcal bacteremia and suspected infection of L SCJ      Antibiotics:  Dapto 450 q24, s 1/16    Admit Date:   1/11/2025  Hospital Day: 7    Subjective:   He remains AF on RA  C/o pain at LSCJ and L shoulder, no sig change  Denies pruritus, diarrhea       Objective:     Patient Vitals for the past 8 hrs:   BP Temp Temp src Pulse Resp SpO2   01/17/25 0806 126/73 97.3 °F (36.3 °C) Axillary 94 22 93 %       EXAM:  General:   cachectic   Resting comfortably, NAD     HEENT:  tongue protrudes  OP dry   NECK:   Supple   Erythema, pain, edema L SCJ, no fluctuance - see photo   LUNGS:   CTA cabrera     CV:   RRR   ABD: Soft, flat, NT     EXT: Erythema confluent on the back and extending to lower abd and posterior legs, see photo, no change today        LINE:   PIV in place         1/16/25              Scheduled Meds:   potassium bicarb-citric acid  40 mEq Oral BID    DAPTOmycin (CUBICIN) 450 mg in sodium chloride 0.9 % 50 mL IVPB  6 mg/kg IntraVENous Q24H    amLODIPine  5 mg Oral Daily    metoprolol succinate  25 mg Oral Daily    sodium chloride flush  5-40 mL IntraVENous 2 times per day    enoxaparin  40 mg SubCUTAneous Daily       Continuous Infusions:   sodium chloride 100 mL/hr at 01/12/25 1540          Data Review:    Lab Results   Component Value Date    WBC 12.7 (H) 01/17/2025    HGB 14.7 01/17/2025    HCT 43.6 01/17/2025    MCV 95.8 01/17/2025     01/17/2025     Lab Results   Component Value Date    CREATININE 0.6 (L) 01/17/2025    BUN 16 01/17/2025     01/17/2025    K 3.3 (L) 01/17/2025     01/17/2025    CO2 28 01/17/2025       Hepatic Function Panel:   Lab Results   Component Value Date/Time    ALKPHOS 78 01/15/2025 04:41 AM    ALT 21 01/15/2025 04:41 AM    AST 28 01/15/2025 04:41 AM    BILITOT 0.7 01/15/2025 04:41 AM    BILIDIR 0.4 01/15/2025 04:41 AM    IBILI 0.3 
                                                                                Infectious Disease Follow up Notes    CC :  streptococcal bacteremia and suspected infection of L SCJ      Antibiotics:  Rocephin 2g q24     Admit Date:   1/11/2025  Hospital Day: 6    Subjective:   He remains AF on RA  C/o pain at LSCJ and L shoulder  Denies pruritus  No other focal complaints      Objective:     Patient Vitals for the past 8 hrs:   BP Temp Temp src Pulse Resp SpO2   01/16/25 0853 118/73 97.8 °F (36.6 °C) Axillary 94 18 95 %   01/16/25 0347 102/79 97.3 °F (36.3 °C) Axillary 93 16 94 %       EXAM:  General:   cachectic   Resting comfortably, NAD     HEENT:  tongue protrudes  OP dry   NECK:   Supple   Erythema, pain, edema L SCJ, no fluctuance - see photo   LUNGS:   CTA cabrera     CV:   RRR   ABD: Soft, flat, NT     EXT: Erythema confluent on the back and extending to lower abd and posterior legs, see photo         LINE:   PIV in place         1/16/25              Scheduled Meds:   cefTRIAXone (ROCEPHIN) IV  2,000 mg IntraVENous Daily    amLODIPine  5 mg Oral Daily    metoprolol succinate  25 mg Oral Daily    sodium chloride flush  5-40 mL IntraVENous 2 times per day    enoxaparin  40 mg SubCUTAneous Daily       Continuous Infusions:   sodium chloride 100 mL/hr at 01/12/25 1540          Data Review:    Lab Results   Component Value Date    WBC 13.7 (H) 01/16/2025    HGB 13.6 01/16/2025    HCT 40.9 01/16/2025    MCV 95.4 01/16/2025     01/16/2025     Lab Results   Component Value Date    CREATININE 0.7 (L) 01/16/2025    BUN 17 01/16/2025     01/16/2025    K 3.5 01/16/2025     01/16/2025    CO2 32 01/16/2025       Hepatic Function Panel:   Lab Results   Component Value Date/Time    ALKPHOS 78 01/15/2025 04:41 AM    ALT 21 01/15/2025 04:41 AM    AST 28 01/15/2025 04:41 AM    BILITOT 0.7 01/15/2025 04:41 AM    BILIDIR 0.4 01/15/2025 04:41 AM    IBILI 0.3 01/15/2025 04:41 AM       CRP   Date Value Ref Range 
  Hospital Medicine Progress Note  V 1.6      Date of Admission: 1/11/2025    Hospital Day: 3      Chief Admission Complaint:  \"Fall w/ L shoulder/hip pain\"     Subjective:  no new c/o    Presenting Admission History:          \"74 y.o. male w/ hx of tongue cancer, which makes his speech difficult to understand at times who presented to [facility] [place] s/p mechanical fall w/out significant head trauma of LOC but with c/o L sided shoulder/hip and back pain.  PMHx significant for HTN w/out known CAD.       The patient denies any fever/chills or other signs/sxs of systemic illness or identifiable aggravating/alleviating factors.\"       Assessment/Plan:      Current Principal Problem:  Leukocytosis      Fall - likely mechanical w/out antecedent sxs suggestive of Neuro/cardiogenic etiology.  PT/OT ordered and pending      HTN - w/out known CAD and no evidence of active signs and/or symptoms of ischemia and/or failure. Currently controlled on home meds w/ vitals documented and reviewed.       Tachycardia - of unclear etiology.  Monitor clinical response to IVF/IV ABX     Leukocytosis - of unclear etiology.  CXR/Urine negative and no other sxs suggesting infection.  Will continue to follow serial labs.  Reviewed and documented in this note     HypoKalemia - etiology clinically unable to determine.  Followed serial Potassium, personally reviewed and documented in this note. Replaced PRN - ordered IV 13 Jan.       HypoPhosphatemia - etiology clinically unable to determine.  Followed serial Phosphorus, personally reviewed and documented in this note. Replaced PRN - ordered IV 13 Jan.         Ongoing threat to life and/or bodily function without ongoing treatment due to:  SIRS of unclear etiology requiring IV ABX    Consults:      None        --------------------------------------------------      Medications:        Infusion Medications    sodium chloride 100 mL/hr at 01/12/25 1540     Scheduled Medications    
  Hospital Medicine Progress Note  V 1.6      Date of Admission: 1/11/2025    Hospital Day: 4      Chief Admission Complaint:  \"Fall w/ L shoulder/hip pain\"     Subjective:  no new c/o    Presenting Admission History:          \"74 y.o. male w/ hx of tongue cancer, which makes his speech difficult to understand at times who presented to [facility] [place] s/p mechanical fall w/out significant head trauma of LOC but with c/o L sided shoulder/hip and back pain.  PMHx significant for HTN w/out known CAD.       The patient denies any fever/chills or other signs/sxs of systemic illness or identifiable aggravating/alleviating factors.\"       Assessment/Plan:      Current Principal Problem:  Leukocytosis      Fall - likely mechanical w/out antecedent sxs suggestive of Neuro/cardiogenic etiology.       HTN - w/out known CAD and no evidence of active signs and/or symptoms of ischemia and/or failure. Currently controlled on home meds w/ vitals documented and reviewed.       Tachycardia - of unclear etiology.  Monitor clinical response to IVF/IV ABX     Bacteremia - presented w/ Leukocytosis of unclear etiology.  CXR/Urine negative and no other sxs suggesting infection.  Will continue to follow serial labs - resolved.  Reviewed and documented in this note.  Blood Cx w/ 2/2 GBS - of unclear etiology.  Rocephin dose increased 13 Jan.       HypoKalemia - etiology clinically unable to determine.  Followed serial Potassium, personally reviewed and documented in this note. Replaced PRN - ordered IV 13 Jan.       HypoPhosphatemia - etiology clinically unable to determine.  Followed serial Phosphorus, personally reviewed and documented in this note. Replaced PRN - ordered IV 13 Jan.         Ongoing threat to life and/or bodily function without ongoing treatment due to:  Bacteremia requiring IV ABX    Consults:      None        --------------------------------------------------      Medications:        Infusion Medications    
  Hospital Medicine Progress Note  V 1.6      Date of Admission: 1/11/2025    Hospital Day: 5      Chief Admission Complaint:  \"Fall w/ L shoulder/hip pain\"     Subjective:  no new c/o except pain over L SC joint    Presenting Admission History:          \"74 y.o. male w/ hx of tongue cancer, which makes his speech difficult to understand at times who presented to [facility] [place] s/p mechanical fall w/out significant head trauma of LOC but with c/o L sided shoulder/hip and back pain.  PMHx significant for HTN w/out known CAD.       The patient denies any fever/chills or other signs/sxs of systemic illness or identifiable aggravating/alleviating factors.\"       Assessment/Plan:      Current Principal Problem:  Leukocytosis      Bacteremia - presented w/ Leukocytosis of unclear etiology.  CXR/Urine negative and no other sxs suggesting infection but started on Rocephin/Azithro empirically on admission.  Azithro completed.   Blood Cx w/ 2/2 GBS - now w/ more evident L SC joint erythema/edema - CT scan ordered 15 Ronak and pending .  Rocephin dose increased 13 Jan.  Infectious Disease consulted and appreciated - continuing current ABX.     Sepsis - w/ Leukocytosis/Tachycardia POArrival 2nd to above infection.  Continue IVF as appropriate and monitor clinical response w/ ABX as ordered.  Will continue to follow serial labs - resolved.  Reviewed and documented in this note    Fall - likely mechanical w/out antecedent sxs suggestive of Neuro/cardiogenic etiology.       HTN - w/out known CAD and no evidence of active signs and/or symptoms of ischemia and/or failure. Currently controlled on home meds w/ vitals documented and reviewed.       Tachycardia - of unclear etiology.  Monitor clinical response to IVF/IV ABX     HypoKalemia - etiology clinically unable to determine.  Followed serial Potassium, personally reviewed and documented in this note. Replaced PRN - ordered IV 13 Jan and PO 15 Ronak.     
  Hospital Medicine Progress Note  V 1.6      Date of Admission: 1/11/2025    Hospital Day: 6      Chief Admission Complaint:  \"Fall w/ L shoulder/hip pain\"     Subjective:  no new c/o    Presenting Admission History:          \"74 y.o. male w/ hx of tongue cancer, which makes his speech difficult to understand at times who presented to [facility] [place] s/p mechanical fall w/out significant head trauma of LOC but with c/o L sided shoulder/hip and back pain.  PMHx significant for HTN w/out known CAD.       The patient denies any fever/chills or other signs/sxs of systemic illness or identifiable aggravating/alleviating factors.\"       Assessment/Plan:      Current Principal Problem:  Leukocytosis      Bacteremia - presented w/ Leukocytosis of unclear etiology.  CXR/Urine negative and no other sxs suggesting infection but started on Rocephin/Azithro empirically on admission.  Azithro completed.   Blood Cx w/ 2/2 GBS - now w/ more evident L SC joint erythema/edema - CT scan ordered 15 Ronak w/out evidence of osteo/abscess.  Rocephin dose increased 13 Jan.  Infectious Disease consulted and appreciated - continuing current ABX.     Sepsis - w/ Leukocytosis/Tachycardia POArrival 2nd to above infection.  Continue IVF as appropriate and monitor clinical response w/ ABX as ordered.  Will continue to follow serial labs - resolved.  Reviewed and documented in this note    Fall - likely mechanical w/out antecedent sxs suggestive of Neuro/cardiogenic etiology.       HTN - w/out known CAD and no evidence of active signs and/or symptoms of ischemia and/or failure. Currently controlled on home meds w/ vitals documented and reviewed.       Tachycardia - of unclear etiology.  Monitor clinical response to IVF/IV ABX     HypoKalemia - etiology clinically unable to determine.  Followed serial Potassium, personally reviewed and documented in this note. Replaced PRN - ordered IV 13 Jan and PO 15 Ronak.       HypoPhosphatemia - 
  Hospital Medicine Progress Note  V 1.6      Date of Admission: 1/11/2025    Hospital Day: 7      Chief Admission Complaint:  \"Fall w/ L shoulder/hip pain\"     Subjective:  no new c/o    Presenting Admission History:          \"74 y.o. male w/ hx of tongue cancer, which makes his speech difficult to understand at times who presented to [facility] [place] s/p mechanical fall w/out significant head trauma of LOC but with c/o L sided shoulder/hip and back pain.  PMHx significant for HTN w/out known CAD.       The patient denies any fever/chills or other signs/sxs of systemic illness or identifiable aggravating/alleviating factors.\"       Assessment/Plan:      Current Principal Problem:  Leukocytosis      Bacteremia - presented w/ Leukocytosis of unclear etiology.  CXR/Urine negative and no other sxs suggesting infection but started on Rocephin/Azithro empirically on admission.  Azithro completed.   Blood Cx w/ 2/2 GBS - now w/ more evident L SC joint erythema/edema - CT scan ordered 15 Ronak w/out evidence of osteo/abscess.  Rocephin dose increased 13 Jan.  Infectious Disease consulted and appreciated - continuing Daptomycin started 16 Jan due to drug rash w/ Rocephin expected offending agent.  Awaiting Cx clearance w/ plan for eventual PICC/IV ABX at discharge.     Sepsis - w/ Leukocytosis/Tachycardia POArrival 2nd to above infection.  Continue IVF as appropriate and monitor clinical response w/ ABX as ordered.  Will continue to follow serial labs - clinically resolved.  Reviewed and documented in this note    Fall - likely mechanical w/out antecedent sxs suggestive of Neuro/cardiogenic etiology.       HTN - w/out known CAD and no evidence of active signs and/or symptoms of ischemia and/or failure. Currently controlled on home meds w/ vitals documented and reviewed.       Tachycardia - of unclear etiology.  Monitor clinical response to IVF/IV ABX     HypoKalemia - etiology clinically unable to determine.  
  Hospital Medicine Progress Note  V 1.6      Date of Admission: 1/11/2025    Hospital Day: 8      Chief Admission Complaint:  \"Fall w/ L shoulder/hip pain\"     Subjective:  no new c/o    Presenting Admission History:       74 y.o. male w/ hx of tongue cancer, which makes his speech difficult to understand at times who presented to [facility] [place] s/p mechanical fall w/out significant head trauma of LOC but with c/o L sided shoulder/hip and back pain.  PMHx significant for HTN w/out known CAD.       The patient denies any fever/chills or other signs/sxs of systemic illness or identifiable aggravating/alleviating factors.       Assessment/Plan:      Current Principal Problem:  Leukocytosis    Bacteremia  -Presented with leukocytosis of unclear origin.   -CXR and urine tests were negative, and no other signs of infection were noted.   -Rocephin and Azithromycin initiated on admission, with Azithromycin completed.   -Blood cultures grew Group B Streptococcus.   -CT scan (01/15) no signs of osteomyelitis or abscess.   -ID consulted - Daptomycin started on 16 January due to a suspected drug rash from Rocephin.  -Awaiting culture clearance, and plans for PICC/IV antibiotics upon discharge are in place.    Sepsis - POA, resolved  -Leukocytosis and tachycardia, likely secondary to the above infection.   -Abx as discussed above    Fall  The fall is likely mechanical in nature, with no evidence suggesting a neurogenic or cardiogenic cause.    Hypertension  -No known hx of CAD  -Controlled with home meds  -Continue amlodipine, toprol XL daily  - hydralazine PRN on board    Tachycardia (resolved)  -Unclear cause  -CTM response to fluid resuscitation and IV Abx    Hypokalemia  -Unclear etiology  -replete as needed    Hypophosphatemia  -Unclear etiology  -replete  as needed    Ongoing threat to life and/or bodily function without ongoing treatment due to:  Bacteremia requiring IV ABX    Consults:      IP CONSULT TO 
  Hospital Medicine Progress Note  V 1.6      Date of Admission: 1/11/2025    Hospital Day: 9      Chief Admission Complaint:  \"Fall w/ L shoulder/hip pain\"     Subjective:  No acute events overnight. Patient was sleeping in bed this am but arousable. Patient was difficult to understand 2/2 hx of tongue cancer. However, he had no acute complaints, and was able to shake his head no to ROS questions. He denied fever, HA, dizziness, SOB, abdominal pain, N/V/D.     Presenting Admission History:       74 y.o. male w/ hx of tongue cancer, which makes his speech difficult to understand at times who presented to [facility] [place] s/p mechanical fall w/out significant head trauma of LOC but with c/o L sided shoulder/hip and back pain.  PMHx significant for HTN w/out known CAD.       The patient denies any fever/chills or other signs/sxs of systemic illness or identifiable aggravating/alleviating factors.     Assessment/Plan:      Current Principal Problem:  Leukocytosis    Bacteremia  Sepsis- Resolved   -Unclear etiology, WBC 16.2 on admission  -CXR and UA were negative, and no other signs of infection were noted.   CT scan (01/15) no signs of osteomyelitis or abscess.   -Rocephin and Azithromycin initiated on admission, with Azithromycin completed.   -Blood cultures grew Group B Streptococcus.   -ID consulted - Daptomycin started on 16 January due to a suspected drug rash from Rocephin.  -Repeat cultures NGTD 01/19  -PICC/IV placement and antibiotics Monday 01/19     Mechanical Fall  -no evidence suggesting a neurogenic or cardiogenic cause  -no acute complaints     Tachycardia (resolved)  -Etiology unclear   -CTM response to fluid resuscitation and IV Abx    Hypertension  -No known hx of CAD  -Controlled with home meds  -Continue amlodipine, toprol XL daily  -Hydralazine PRN     Hypokalemia  -Unclear etiology  -Repeat 01/19 4.2  -Serial BMP     Hypophosphatemia  -Unclear etiology  -Repeat phosphate ordered 0/19 
  Patient resting in bed, denies pain or discomfort. Tolerated mouth care with no complaints. Call light and bedside table within reach, bed alarm activated.   
  Physician Progress Note      PATIENT:               GABI MCCARTHY  CSN #:                  [id number]  :                       1950  ADMIT DATE:       2025 9:43 AM  DISCH DATE:  RESPONDING  PROVIDER #:        Moe Brown MD          QUERY TEXT:    Pt admitted with fall. Pt noted to have SIRS with positive blood cultures. If   possible, please document in the progress notes and discharge summary if you   are evaluating and /or treating any of the following:    The medical record reflects the following:  Risk Factors: SIRS, weakness, falls  Clinical Indicators: Blood cx + for BHS Group B, WBC 16.2, Lac 2.0, ,   temp 102.9  Treatment: Blood cx, zosyn, vanc, 250mL bolus  Options provided:  -- Sepsis, present on admission  -- Other - I will add my own diagnosis  -- Disagree - Not applicable / Not valid  -- Disagree - Clinically unable to determine / Unknown  -- Refer to Clinical Documentation Reviewer    PROVIDER RESPONSE TEXT:    This patient has sepsis which was present on admission.    Query created by: Mell Ruiz on 2025 12:29 PM      Electronically signed by:  Moe Brown MD 1/15/2025 7:40 AM          
  Speech Language Pathology  Clinical Bedside Swallow Assessment  Facility/Department: [facility] C5 - MED SURG/ORTHO      Recommendations:  Diet recommendation: IDDSI 5 Minced and moist Solids; IDDSI 0 Thin Liquids via straw; Meds whole in puree  Instrumentation: MBSS is recommended to further assess oropharyngeal structures and functions ; will need order placed  Risk management: upright for all intake, liquids via straw, small bites/sips, slow rate of intake, and general aspiration precautions      NAME:Jerome Maria  : 1950 (74 y.o.)   MRN: [id number]  ROOM: [address] [place] [address]  ADMISSION DATE: 2025  PATIENT DIAGNOSIS(ES): Hypokalemia [E87.6]  Leukocytosis [D72.829]  Prerenal azotemia [R79.89]  SIRS (systemic inflammatory response syndrome) (HCC) [R65.10]  Fall, initial encounter [W19.XXXA]  Fever, unspecified fever cause [R50.9]  Leukocytosis, unspecified type [D72.829]  Chief Complaint   Patient presents with    Fall     Fell 2 days ago    Hip Pain     Left hip    Shoulder Pain     Left shoulder    Fatigue     Pt states he is suffering from an acute general illness. Hr 130 and 02 sat 78 per EMS upon arrival    Back Pain     Patient Active Problem List    Diagnosis Date Noted    Bacteremia due to group B Streptococcus 01/15/2025    SIRS (systemic inflammatory response syndrome) (HCC) 01/15/2025    Leukocytosis 2025    Hypoxia 10/16/2023    Dyspnea and respiratory abnormalities 10/16/2023    Acute bronchitis 10/16/2023    Hypertension 10/16/2023    Acute respiratory failure 10/10/2023    Closed fracture of multiple ribs of right side 2023    Rhabdomyolysis 2023    New persistent daily headache     NPH (normal pressure hydrocephalus) (HCC)     HTN (hypertension), benign     Headache 2021    Intractable nausea and vomiting 2021    History of oral cancer 2021    Dysphagia 2021    Moderate protein-calorie malnutrition (HCC) 2017    Nondisplaced unspecified condyle 
  Speech Language Pathology  Dysphagia Treatment/Follow-Up Note  Facility/Department: [place] [address] - MED SURG/ORTHO    Recommendations:  Solid Consistency: IDDSI 5 Minced and moist Solids  Liquid Consistency: IDDSI 0 Thin Liquids via straw only  Medication: Meds whole in puree    Risk Management: upright for all intake, small bites/sips, oral care 2-3x/day to reduce adverse affects in the event of aspiration, slow rate of intake, and general aspiration precautions.     ** Recommend MBS to further assess swallow. Pt agreeable. SLP messaged MD to place order- will complete today as schedule allows.     NAME:Jerome Maria  : 1950 (74 y.o.)   MRN: [id number]  ROOM: [facility]/05[facility]-  ADMISSION DATE: 2025  PATIENT DIAGNOSIS(ES): Hypokalemia [E87.6]  Leukocytosis [D72.829]  Prerenal azotemia [R79.89]  SIRS (systemic inflammatory response syndrome) (HCC) [R65.10]  Fall, initial encounter [W19.XXXA]  Fever, unspecified fever cause [R50.9]  Leukocytosis, unspecified type [D72.829]  Allergies   Allergen Reactions    Morphine        DATE ONSET: 2025    Pain: The patient does not complain of pain.       Current Diet: ADULT ORAL NUTRITION SUPPLEMENT; Breakfast, Dinner; Standard High Calorie/High Protein Oral Supplement  ADULT DIET; Dysphagia - Minced and Moist      Diet Tolerance:  Patient tolerating current diet level without signs/symptoms of aspiration.     Dysphagia Treatment and Impressions:  Subjective: Pt seen in room at bedside with RN permission. RN present administering meds.    Behavior / Cognition: Pt pleasant and agreeable to tx.   RN Report/Chart Review: h/o tongue CA s/p partial resection  Patient tolerance: Pt tolerated session.     Baseline Respiratory Status Measures: Pt with SPO2% of 95 on RA  with RR of 18    Liquid PO Trials:    IDDSI 0 Thin:  Assessed via straw: immediate throat clearing and vitals stable      Solid PO Trials  IDDSI 4 Puree:   with pills- suspect reduced/impaired A-P bolus transit, 
  Speech Language Pathology  MBSS Attempt Note     Name: Jerome Maria  : 1950  Medical Diagnosis: Hypokalemia [E87.6]  Leukocytosis [D72.829]  Prerenal azotemia [R79.89]  SIRS (systemic inflammatory response syndrome) (HCC) [R65.10]  Fall, initial encounter [W19.XXXA]  Fever, unspecified fever cause [R50.9]  Leukocytosis, unspecified type [D72.829]      SLP recommended MBS to further assess swallow function. Due to technical difficulties and imaging equipment not properly working, MBSS was unable to be completed this date. Continue with current diet. MBSS can be completed as outpatient if patient is discharged prior to equipment being fixed.     Thank you,    Lani Shrestha M.A., CCC-SLP  Speech-Language Pathologist  [id number]      
4 Eyes Skin Assessment     NAME:  Jerome Maria  YOB: 1950  MEDICAL RECORD NUMBER:  [id number]    The patient is being assessed for  Admission    I agree that at least one RN has performed a thorough Head to Toe Skin Assessment on the patient. ALL assessment sites listed below have been assessed.      Areas assessed by both nurses:    Head, Face, Ears, Shoulders, Back, Chest, Arms, Elbows, Hands, Sacrum. Buttock, Coccyx, Ischium, and Legs. Feet and Heels        Does the Patient have a Wound? No noted wound(s)       Stanislav Prevention initiated by RN: Yes  Wound Care Orders initiated by RN: No    Pressure Injury (Stage 3,4, Unstageable, DTI, NWPT, and Complex wounds) if present, place Wound referral order by RN under : No    New Ostomies, if present place, Ostomy referral order under : No     Nurse 1 eSignature: Electronically signed by Chayito Greene RN on 1/11/25 at 3:56 PM EST    **SHARE this note so that the co-signing nurse can place an eSignature**    Nurse 2 eSignature: {Esignature:[id number]}   
Comprehensive Nutrition Assessment    Type and Reason for Visit:  Initial, Positive nutrition screen (MST: 2)    Nutrition Recommendations/Plan:   Continue regular diet.   Continue Ensure BID (monitor for acceptance).   Encourage PO intake as tolerated.   Continue to monitor electrolytes and replace as needed.   Monitor nutrition adequacy, pertinent labs, bowel habits, wt changes, and clinical progress      Malnutrition Assessment:  Malnutrition Status:  At risk for malnutrition (01/13/25 6307)    Context:  Chronic Illness     Findings of the 6 clinical characteristics of malnutrition:  Energy Intake:  Mild decrease in energy intake  Weight Loss:  Unable to assess (limited recent hx)     Body Fat Loss:  Unable to assess     Muscle Mass Loss:  Unable to assess    Fluid Accumulation:  Unable to assess (none documented)     Strength:  Not Performed    Nutrition Assessment:    Attempted to visit pt for positive screen but he was sleeping soundly w/ no family members in room. Admitted after mechanical fall w/ c/o L sided shoulder, hip, and back pain but found to have leukocytosis. PMH of tongue cancer and HTN. One PO intake documented this admit (1-25%). GENARO wts in EMR d/t limited recent hx. Will keep Ensure BID ordered to better meet estimated nutrition needs during stay. Will continue to monitor.    Nutrition Related Findings:    Last BM documented 1/12. Active BS. Potassium: 2.8 and phosphorus 1.5 (replacements given this morning in MAR). Cr: 0.5. No edema documented. Wound Type: None       Current Nutrition Intake & Therapies:    Average Meal Intake: 1-25% (1 intake recorded)  Average Supplements Intake: Unable to assess  ADULT DIET; Regular  ADULT ORAL NUTRITION SUPPLEMENT; Breakfast, Dinner; Standard High Calorie/High Protein Oral Supplement    Anthropometric Measures:  Height: 175.3 cm (5' 9.02\")  Ideal Body Weight (IBW): 160 lbs (73 kg)    Current Body Weight: 83 kg (182 lb 15.7 oz), 114.4 % IBW. Weight 
Comprehensive Nutrition Assessment    Type and Reason for Visit:  Reassess    Nutrition Recommendations/Plan:   Continue minced and moist solids w/ thin liquids (textures and consistencies per SLP recommendations).   Continue Ensure BID (prefers chocolate).   Encourage PO intake as tolerated.   Due to continued inadequate PO intake and moderate malnutrition, patient may benefit from supplemental enteral nutrition if PO intakes do not improve. Consult RD for recommendations if appropriate.   Monitor nutrition adequacy, pertinent labs, bowel habits, wt changes, and clinical progress      Malnutrition Assessment:  Malnutrition Status:  Moderate malnutrition (01/20/25 1333)    Context:  Chronic Illness     Findings of the 6 clinical characteristics of malnutrition:  Energy Intake:  Mild decrease in energy intake  Weight Loss:  Unable to assess (limited recent hx)     Body Fat Loss:  Mild body fat loss Orbital   Muscle Mass Loss:  Mild muscle mass loss Clavicles (pectoralis & deltoids), Temples (temporalis)  Fluid Accumulation:  No fluid accumulation     Strength:  Not Performed    Nutrition Assessment:    Follow-up. Pt remains nutritionally compromised AEB PO intakes of mainly 0-50%. Reports his appetite has been fair this past week and that at baseline he is not a big eater. Diet has been downgraded from regular to minced and moist solids w/ thin liquids after SLP bedside swallow eval on 1/18 (MBSS planned). Reports constipation (last BM documented 1/18). Agreeable to continue Ensure BID. Encouraged PO intake as tolerated. Will continue to monitor.    Nutrition Related Findings:    Last BM documented 1/18. Active BS. Cr: 0.7. No edema. Wound Type: None       Current Nutrition Intake & Therapies:    Average Meal Intake: 1-25%, 26-50%  Average Supplements Intake: 1-25%  ADULT ORAL NUTRITION SUPPLEMENT; Breakfast, Dinner; Standard High Calorie/High Protein Oral Supplement  ADULT DIET; Dysphagia - Minced and 
Coughing with soft lunch.  Drank ensure and a few sips of tomato soup. Mouth care done often tongue still dry.   
Has been resting in bed peacefully since the start of the shift. Woke up for a short time for vitals and short conversation. Denies any needs.   Pleasant. Cooperative. Bed alarm remains on. Call light in reach.     
New consult placed to ID via perfect serve  
Occupational Therapy  Facility/Department: [facility] C5 - MED SURG/ORTHO  Daily Treatment Note  NAME: Jerome Maria  : 1950  MRN: [id number]    Date of Service: 2025    Discharge Recommendations:  Subacute/Skilled Nursing Facility  OT Equipment Recommendations  Equipment Needed: No  Other: Defer    Therapy discharge recommendations are subject to collaboration from the patient’s interdisciplinary healthcare team, including MD and case management recommendations.    Barriers to Home Discharge:   [] Steps to access home entry or bed/bath:   [x] Unable to transfer, ambulate, or propel wheelchair household distances without assist   [x] Limited available assist at home upon discharge    [x] Patient or family requests d/c to post-acute facility    [x] Poor cognition/safety awareness for d/c to home alone    [] Unable to maintain ordered weight bearing status    [x] Patient with salient signs of long-standing immobility   [x] Decreased independence with ADLs   [x] Increased risk for falls   [] Other:    If pt is unable to be seen after this session, please let this note serve as discharge summary.  Please see case management note for discharge disposition.  Thank you.    Patient Diagnosis(es): The primary encounter diagnosis was SIRS (systemic inflammatory response syndrome) (HCC). Diagnoses of Fall, initial encounter, Prerenal azotemia, Hypokalemia, Fever, unspecified fever cause, Leukocytosis, unspecified type, and Bacteremia due to group B Streptococcus were also pertinent to this visit.     Assessment   Assessment: Co-tx collaboration this date to safely meet goals and will have better occupational performance outcomes with in a co-treatment than 1:1 session.  Pt tolerated session fair, performing bed mobility w/ mod-max Ax2 and sat EOB for 20 mins, ranging from max A-CGA for balance (pt pushing posteriorly while performing BLE ex w/ PT). He performed seated grooming tasks w/ min A for thoroughness and CGA 
Occupational Therapy  Facility/Department: [facility] C5 - MED SURG/ORTHO  Daily Treatment Note  NAME: Jerome Maria  : 1950  MRN: [id number]    Date of Service: 1/15/2025    Discharge Recommendations:  Subacute/Skilled Nursing Facility  OT Equipment Recommendations  Equipment Needed: No  Other: Defer    Therapy discharge recommendations are subject to collaboration from the patient’s interdisciplinary healthcare team, including MD and case management recommendations.    Barriers to Home Discharge:   [] Steps to access home entry or bed/bath:   [x] Unable to transfer, ambulate, or propel wheelchair household distances without assist   [x] Limited available assist at home upon discharge    [] Patient or family requests d/c to post-acute facility    [] Poor cognition/safety awareness for d/c to home alone    [] Unable to maintain ordered weight bearing status    [x] Patient with salient signs of long-standing immobility   [x] Decreased independence with ADLs   [x] Increased risk for falls   [] Other:    If pt is unable to be seen after this session, please let this note serve as discharge summary.  Please see case management note for discharge disposition.  Thank you.    Patient Diagnosis(es): The primary encounter diagnosis was SIRS (systemic inflammatory response syndrome) (HCC). Diagnoses of Fall, initial encounter, Prerenal azotemia, Hypokalemia, Fever, unspecified fever cause, Leukocytosis, unspecified type, and Bacteremia due to group B Streptococcus were also pertinent to this visit.     Assessment   Assessment: Co-tx collaboration this date to safely meet goals and will have better occupational performance outcomes with in a co-treatment than 1:1 session.  Pt tolerated session fair, able to sit EOB for 15 mins w/ max A for balance. He required max Ax2 for bed mobility and assist for lateral reach w/ BUE across midline (x5 reps each side). He also tolerated x10 grasp and release ex while seated EOB. Pt 
Occupational Therapy  Facility/Department: [place] [address] - MED SURG/ORTHO  Occupational Therapy Initial Assessment    Name: Jerome Maria  : 1950  MRN: [id number]  Date of Service: 2025    Discharge Recommendations:  Subacute/Skilled Nursing Facility  Therapy discharge recommendations take into account each patient's current medical complexities and are subject to input/oversight from the patient's healthcare team.   Barriers to Home Discharge:     [x] Unable to transfer, ambulate, or propel wheelchair household distances without assist   [x] Limited available assist at home upon discharge       If pt is unable to be seen after this session, please let this note serve as discharge summary.  Please see case management note for discharge disposition.  Thank you.           Patient Diagnosis(es): The primary encounter diagnosis was SIRS (systemic inflammatory response syndrome) (HCC). Diagnoses of Fall, initial encounter, Prerenal azotemia, Hypokalemia, Fever, unspecified fever cause, and Leukocytosis, unspecified type were also pertinent to this visit.  Past Medical History:  has a past medical history of Cancer (HCC), Radiation, and S/P chemotherapy, time since greater than 12 weeks.  Past Surgical History:  has a past surgical history that includes other surgical history; eye surgery (Right, 13); and Upper gastrointestinal endoscopy (N/A, 2021).           Assessment  Performance deficits / Impairments: Decreased ADL status;Decreased functional mobility ;Decreased balance;Decreased strength  Assessment: pt from home reports normally independent with stand-pivot transfers to/from w/c, toilets self independent including managing clothing; family assists with all homeking & LE dressing; pt admitted s/p fall, now requiring mod/max assist of 2 bed mobility, mod assist of 2 stand-pivot transfers; pt cooperative & to benefit from skilled OT sevices  REQUIRES OT FOLLOW-UP: Yes  Activity Tolerance  Activity 
Order for PICC line Per Dr. Tyson. Pre procedure and timeout done with pt's RN.    Successful insertion of a SL PICC line into pt's right basilic vein. No issues gaining access or advancing guidewire/introducer/PICC line. PICC tip terminates in the SVC according to [facility] 3CG tip confirmation system. PICC was seen dropping into SVC with tip tracking technology and discernable peaked p waves were noted without negative deflection. A printout will be in pt's chart.     PICC is cut at 41 cm and out externally 0 cm. SL lumens flushes  without resistance and draw back brisk blood return.    PICC site CDI with hemostasis maintained and a biopatch applied to site. Pt instructed to stay in bed and keep arm flat and still for 30 minutes  to promote hemostasis.     Angi WILKERSON given handoff report               
Patient has declined pain, tolerated repositioning and oral care. Call light in reach with bed in lowest postion. Patient has slept well this shift easy awaken when entering room.   
Patient has not eaten much food today.  He has drank most of his supplement ensure.  Denies nausea, His pain in his neck and legs is requiring oxycodone  and heat pack on neck for comfort.  Pleasant and alert and oriented.   
Patient sister Jazz called RN to get update. Patient gave RN verbal permission to speak with sister. Jazz is concerned that patient is becoming more confused and wants to ensure doctors are aware. Rn will pass along in report. Patient resting in bed watching tv, denies pain or discomfort. Patient able to verify name, , location and year with x2 trys. Call light within reach with bed in lowest position and bed alarm activated.   
Physical Therapy  Facility/Department: [place] [address] - MED SURG/ORTHO  Daily Treatment Note  NAME: Jerome Maria  : 1950  MRN: [id number]    Date of Service: 2025    Discharge Recommendations:  Subacute/Skilled Nursing Facility   PT Equipment Recommendations  Equipment Needed: No  Other: defer to next level of care.    Patient Diagnosis(es): The primary encounter diagnosis was SIRS (systemic inflammatory response syndrome) (HCC). Diagnoses of Fall, initial encounter, Prerenal azotemia, Hypokalemia, Fever, unspecified fever cause, Leukocytosis, unspecified type, and Bacteremia due to group B Streptococcus were also pertinent to this visit.    Assessment  Assessment: Currently pt demonstrates MAX A X2 for bed mobility, pt demos poor trunk stability (mod-max A), squat pivot transfer not attempted d/t this, but pt able to sit EOB for dynamic balance and U/LE TE for 20'.  pt performs scooting to HOB mod A x 2.  pt notably fatigued from activity on today's date.  pt with notable balance and strength deficits.  Recommend DC to SNF when medically  Activity Tolerance: Patient limited by fatigue;Patient limited by endurance;Patient tolerated treatment well  Equipment Needed: No  Other: defer to next level of care.    Plan  Physical Therapy Plan  General Plan: 3-5 times per week  Current Treatment Recommendations: Strengthening;Balance training;Functional mobility training;Transfer training;Endurance training;Neuromuscular re-education;Home exercise program;Safety education & training;Patient/Caregiver education & training;Equipment evaluation, education, & procurement;Therapeutic activities;Co-Treatment;Wheelchair mobility training    Restrictions  Restrictions/Precautions  Restrictions/Precautions: Fall Risk, General Precautions  Position Activity Restriction  Other Position/Activity Restrictions: IV, purewic,     Subjective   Orientation  Overall Orientation Status: Within Functional Limits  Orientation Level: 
Physical Therapy  Facility/Department: [facility] C5 - MED SURG/ORTHO  Daily Treatment Note  NAME: Jerome Maria  : 1950  MRN: [id number]    Date of Service: 2025    Discharge Recommendations:  Subacute/Skilled Nursing Facility   PT Equipment Recommendations  Equipment Needed: No  Other: defer to next level of care.    Patient Diagnosis(es): The primary encounter diagnosis was SIRS (systemic inflammatory response syndrome) (HCC). Diagnoses of Fall, initial encounter, Prerenal azotemia, Hypokalemia, Fever, unspecified fever cause, Leukocytosis, unspecified type, and Bacteremia due to group B Streptococcus were also pertinent to this visit.    Assessment  Assessment: Currently pt demonstrates MAX A X2 for bed mobility, pt demos poor trunk stability (mod-max A),  pt able to sit EOB for dynamic balance and U/LE TE for 20'.  pt performs scooting to HOB mod A x 2.  pt notably fatigued from activity on today's date.  pt with notable balance and strength deficits.  Recommend DC to SNF when medically  Activity Tolerance: Patient limited by fatigue;Patient limited by endurance;Patient tolerated treatment well    Plan  Physical Therapy Plan  General Plan: 3-5 times per week  Current Treatment Recommendations: Strengthening;Balance training;Functional mobility training;Transfer training;Endurance training;Neuromuscular re-education;Home exercise program;Safety education & training;Patient/Caregiver education & training;Equipment evaluation, education, & procurement;Therapeutic activities;Co-Treatment;Wheelchair mobility training    Restrictions  Restrictions/Precautions  Restrictions/Precautions: Fall Risk, General Precautions  Position Activity Restriction  Other Position/Activity Restrictions: IV, purewic,     Subjective   Orientation  Overall Orientation Status: Within Functional Limits    Objective  Vitals  Vitals:    25 0853   BP: 118/73   Pulse: 94   Resp: 18   Temp: 97.8 °F (36.6 °C)   SpO2: 95%      Bed 
Physical Therapy  Facility/Department: [facility] C5 - MED SURG/ORTHO  Physical Therapy Initial Assessment    Name: Jerome Maria  : 1950  MRN: [id number]  Date of Service: 2025    Discharge Recommendations:  Subacute/Skilled Nursing Facility   PT Equipment Recommendations  Equipment Needed: No  Other: defer to next level of care.      Therapy discharge recommendations take into account each patient's current medical complexities and are subject to input/oversight from the patient's healthcare team.   Barriers to Home Discharge:   [] Steps to access home entry or bed/bath:   [x] Unable to transfer, ambulate, or propel wheelchair household distances without assist   [x] Unable to perform ADLs without assist   [] Limited available assist at home upon discharge    [] Patient or family requests d/c to post-acute facility    [] Poor cognition/safety awareness for d/c to home alone    [] Unable to maintain ordered weight bearing status    [] Patient with salient signs of long-standing immobility   [x] Other: pt at increased risk for falls.     Patient Diagnosis(es): The primary encounter diagnosis was SIRS (systemic inflammatory response syndrome) (HCC). Diagnoses of Fall, initial encounter, Prerenal azotemia, Hypokalemia, Fever, unspecified fever cause, and Leukocytosis, unspecified type were also pertinent to this visit.  Past Medical History:  has a past medical history of Cancer (HCC), Radiation, and S/P chemotherapy, time since greater than 12 weeks.  Past Surgical History:  has a past surgical history that includes other surgical history; eye surgery (Right, 13); and Upper gastrointestinal endoscopy (N/A, 2021).    Assessment  Assessment: Pt seen in conjunction with OT to maximize pt safety and mobility and safely assess pt maximal level of mobility.  Pt presents to [facility] with primary diagnosis of leukocytosis.  PTA pt lived at home in trailer with his brother, reports his sisters live on the R and 
Pt arrived to unit [facility].  
Sister Karen has wallet at home.    Pt/sister states he takes no home medications  
Limits    Objective  Vitals    Pulse: 91  BP: (!) 150/86  BP Location: Right upper arm  MAP (Calculated): 107  SpO2: 90 %  O2 Device: None (Room air)  Bed Mobility Training  Bed Mobility Training: Yes  Interventions: Verbal cues;Weight shifting training/pressure relief;Safety awareness training;Tactile cues;Manual cues  Rolling: Moderate assistance  Supine to Sit: Additional time;Adaptive equipment;Assist X2;Maximum assistance  Sit to Supine: Assist X2;Maximum assistance;Additional time;Adaptive equipment  Scooting: Maximum assistance;Assist X2 (to HOB)  Balance  Sitting: Impaired  Sitting - Static: Poor (constant support)  Sitting - Dynamic: Poor (constant support)     PT Exercises  Exercise Treatment: AP, SAQ, hip add/abd, HS 8-12 each in seated with occasional rest breaks.  Other Specialty Interventions  Other Treatments/Modalities: Sitting trunk control and balance with unilat UE/LE activities.  Pt was sitting 15 mins, primarily max A x 1.  Safety Devices  Type of Devices: Gait belt;Nurse notified;All fall risk precautions in place;Patient at risk for falls;Left in bed (left in care of pt transport tech)      Goals  Short Term Goals  Time Frame for Short Term Goals: 7 days (1/19) unless otherwise noted. -1/15  continue  Short Term Goal 1: Pt will demonstrate MOD A X2 with bed mobility.  Short Term Goal 2: Pt will demonstrate MIN A X2 with functional transfers with LRAD.  Short Term Goal 3: pt will propel WC 50' with MOD IND.  Short Term Goal 4: Pt will participate in 4 different BLE exercises of 12-15 reps each to improve strength and endurance by 1/17. -1/15 8-12 4 Feliz  Patient Goals   Patient Goals : \"I want to go home.\"    Education  Patient Education  Education Given To: Patient  Education Provided: Role of Therapy;Plan of Care;Home Exercise Program;Fall Prevention Strategies;Transfer Training  Education Provided Comments: educated pt on safety in hospital, use of call light, HEP performance, benefits to 
X2;Additional time;Adaptive equipment (stedy)  Bed to Chair: Total assistance;Adaptive equipment;Assist X2 (stedy)     ADL  Feeding: Setup;Increased time to complete  Grooming: Moderate assistance;Increased time to complete;Verbal cueing  Grooming Skilled Clinical Factors: Mod A to brush hair using R hand while seated in chair . Pt has difficulty using L hand for grooming  UE Dressing: Moderate assistance;Increased time to complete  UE Dressing Skilled Clinical Factors: gown  Putting On/Taking Off Footwear: Dependent/Total  Putting On/Taking Off Footwear Skilled Clinical Factors: socks  Toileting: Dependent/Total  Toileting Skilled Clinical Factors: [employer]      Safety Devices  Type of Devices: Chair alarm in place;Gait belt;Nurse notified;Call light within reach;All fall risk precautions in place;Left in chair (Left with PT)    Patient Education  Education Given To: Patient  Education Provided: Role of Therapy;Transfer Training;Plan of Care;Equipment;Precautions;Orientation;Mobility Training  Education Provided Comments: Pt educated on importance of OOB mobility, prevention of complications of bedrest, and general safety during hospitalization  Education Method: Demonstration;Verbal  Barriers to Learning: Readiness to Learn  Education Outcome: Verbalized understanding;Continued education needed    Goals  Short Term Goals  Time Frame for Short Term Goals: 1 week(1-19-25--goals extended to 1/26)- all goals ongoing 1/20  Short Term Goal 1: min assist of 1 with stand-pivot transfers  Short Term Goal 2: min assist of 1 with sit<-->stand transfers by 1-18-25 (extend to 1/24)  Short Term Goal 3: mod assist with toileting self  Short Term Goal 4: tolerate 2 sets of 15 reps BUE AROM exercises to increase strength for transfers/toileting;  Patient Goals   Patient goals : \"home when able \"    AM-PAC - ADL  AM-PAC Daily Activity - Inpatient   How much help is needed for putting on and taking off regular lower body clothing?:

## 2025-01-20 NOTE — DISCHARGE INSTR - COC
support    Rehab Therapies: Physical Therapy, Occupational Therapy, and Speech/Language Therapy  Weight Bearing Status/Restrictions: No weight bearing restrictions  Other Medical Equipment (for information only, NOT a DME order):  hospital bed x2 stedy  Other Treatments:     Patient's personal belongings (please select all that are sent with patient):  None    RN SIGNATURE:  Electronically signed by Angi Hi RN on 1/20/25 at 3:16 PM EST    CASE MANAGEMENT/SOCIAL WORK SECTION    Inpatient Status Date: 01/11/2025    Readmission Risk Assessment Score:  [facility] RISK OF UNPLANNED READMISSION 2.0             13.9 Total Score        Discharging to Facility/ Agency   [facility] [place]  Services: Skilled Nursing  [address]  [facility] [address]  [phone]     / signature: Electronically signed by MONICA TALBOT RN on 1/20/25 at 2:52 PM EST    PHYSICIAN SECTION    Prognosis: Fair    Condition at Discharge: Stable    Rehab Potential (if transferring to Rehab): Fair    Recommended Labs or Other Treatments After Discharge: CBC, CMP      Recommended Follow-up, Labs or Other Treatments After Discharge:    ID INFUSION ORDERS:  Daptomycin 450mg IV q24 through 2/24/25  - Diagnosis streptococcal bacteremia and septic arthritis L SCJ   - First dose given in hospital    - PICC and Central Tunneled/Non-Tunneled:   NS 5 to 10mL pre-/post-use, 5 to 10mL pre-lab draw, and 10mL post-lab draw.   Heparin: (10 unit/mL) 5mL   For Maintenance, heparin (10 unit/mL) 5 mL or (100 unit/mL) 3 mL every 24 hr.    - Labs: CBC w diff, creatinine, LFTs, CRP, CK every Mon, FAX results to [phone]  - Call with antibiotic / infusion issues, [phone]  - Call with any change in status, transfer in or out of a facility or to hospital - [phone]  - Orders only valid for current disposition, NOT transferable upon discharge from facility or new admission to another facility.  - No f/u in outpatient ID office

## 2025-01-20 NOTE — DISCHARGE SUMMARY
1558 Called report to [facility] [phone]  
are normal. Renal size and enhancement is normal. There is atherosclerotic changes of the abdominal aorta and common iliac arteries. No aneurysm is identified. No bowel dilatation or bowel wall thickening is identified. No abdominal or pelvic lymphadenopathy is identified. No abnormal fluid collects is are identified. Degenerative changes are present within the spine.     1. No evidence of any acute abdominal or pelvic process.  Electronically signed by Grzegorz Corley MD    CT Head W/O Contrast    Result Date: 1/11/2025  EXAM: CT HEAD WO CONTRAST INDICATION: fall; pain COMPARISON: 2023 TECHNICAL: Axial CT imaging obtained from vertex to skull base. Axial images and multiplanar reformatted images reviewed.   Individualized dose optimization technique was used in order to meet ALARA standards for radiation dose reduction.  In addition to vendor specific dose reduction algorithms, the dose reduction techniques vary based on the specific scanner utilized but frequently include automated exposure control, adjustment of the mA and/or kV according to patient size, and use of iterative reconstruction technique. IV Contrast: None. FINDINGS: INTRACRANIAL HEMORRHAGE: None. VENTRICLES: Ventricles and sulci are diffusely prominent. BRAIN PARENCHYMA: No midline shift or mass effect. Mild white matter disease. SKULL: No destructive osseous process or fracture. PARANASAL SINUSES AND MASTOIDS: No acute sinusitis or mastoiditis. ORBITS: Normal. EXTRACRANIAL SOFT TISSUES: Normal.     1. No acute intracranial hemorrhage or mass effect. Electronically signed by Mars Nino    XR HIP 2-3 VW W PELVIS LEFT    Result Date: 1/11/2025  PELVIS AND LEFT HIP CLINICAL HISTORY: Pain. FINDINGS:  AP view of the pelvis and one view of the left hip (lateral) was obtained. There is no evidence of fracture or subluxation. The right and left hip joint spaces are maintained. Calcified vascular phlebolith overlie the pelvis. There is

## 2025-01-20 NOTE — CARE COORDINATION
CASE MANAGEMENT DISCHARGE SUMMARY      Discharge to: [facility];SNF     Hospital Exemption Notification (HENS) completed: yes    IMM given: 1/20/25  Follow-Up copy of Important Message from Medicare (IMM2) has been explained to patient and/or designated healthcare decision maker (HCDM). Pt and/or HCDM aware that patient is permitted to stay an additional 4 hours prior to discharge to consider an appeal if they feel as though they are being discharged too soon. Patient may discharge as planned if chooses to do so.  Patient/HCDM voice no other concerns or questions regarding this process.     Transportation:    Medical Transport explained to pt/family. Pt/family voice no agency preference.        Confirmed discharge plan with:     Patient: yes     Family:  Karen [phone]     Facility/Agency, name:  [facility]    Phone number for report to facility: [phone]      RN, name: Angi     Note: Discharging nurse to complete SALVADOR, reconcile AVS, and place final copy with patient's discharge packet. RN to ensure that written prescriptions for  Level II medications are sent with patient to the facility as per protocol.        
CM update note   PD #6  Dc plan is [facility] when ready - no pre cert needed. Will need PICC for 6 wks IVABx - defer PICC placement until bacteremia clears per ID note.    Following.      Leila Springer RN    
Chart reviewed day 3. Pt is followed by IM. Receiving IV abx;PNA. Plan to d/c to [facility] w/no cert needed. Updated Karen via phone. Will follow for needs as they arise. Electronically signed by MONICA TALBOT RN on 1/14/2025 at 1:20 PM    
Follow-Up copy of Important Message from Medicare (IMM2) has been explained to patient and/or designated healthcare decision maker (HCDM). Pt and/or HCDM aware that patient is permitted to stay an additional 4 hours prior to discharge to consider an appeal if they feel as though they are being discharged too soon. Patient may discharge as planned if chooses to do so.  Patient/HCDM voice no other concerns or questions regarding this process.        Leila Springer, RN    
ID consult noted and pending. Will monitor for recommendations. Plan at present if for [facility] at d/c. Traditional Medicare. No pre-cert needed. Will follow and continue to assist with d/c to SNF when medically ready for discharge.   
Plan for 6 weeks of IVABX's per ID. Defer PICC placement pending clearance of bacteremia. Plan for [facility] when stable. Medicare/ traditional/ no pre-cert needed. Confirmed with [facility] they are following and can take patient.   
Bathing, Dressing, Toileting, Cooking, Housework, Shopping, Mobility    PT AM-PAC: 10 /24  OT AM-PAC: 10 /24    Family can provide assistance at DC: Other (comment) (at times)  Would you like Case Management to discuss the discharge plan with any other family members/significant others, and if so, who? Yes (Karen)  Plans to Return to Present Housing: Unknown at present  Other Identified Issues/Barriers to RETURNING to current housing: Weakness.   Potential Assistance needed at discharge: Skilled Nursing Facility            Potential DME:    Patient expects to discharge to: Skilled nursing facility  Plan for transportation at discharge: Family    Financial    Payor: MEDICARE / Plan: MEDICARE PART A AND B / Product Type: *No Product type* /     Does insurance require precert for SNF: No    Potential assistance Purchasing Medications: No  Meds-to-Beds request:        [facility] ([facility]) - [address] -  [phone] - F [phone]  [address] [place] [address] [address]  [facility] [address]  Phone: [phone] Fax: [phone]    [facility] [id number] [address] -  [phone] - F [phone]  [address] [id number]  Phone: [phone] Fax: [phone]      Notes:    Factors facilitating achievement of predicted outcomes: Family support, Motivated, Cooperative, and [facility]    Barriers to discharge: Medication managment    Additional Case Management Notes: Pt IPTA in mobile home w/brotherMo. Pt owns a walker, rarely uses. Brother provides transport. Pt would like SisterKaren involved in SNF preference, VM left and awaiting call back. PCP, unknown per pt? Active Insurance. Will follow for needs as they arise.     The Plan for Transition of Care is related to the following treatment goals of Hypokalemia [E87.6]  Leukocytosis [D72.829]  Prerenal azotemia [R79.89]  SIRS (systemic inflammatory response syndrome) (HCC)

## 2025-01-20 NOTE — PLAN OF CARE
Problem: Discharge Planning  Goal: Discharge to home or other facility with appropriate resources  1/20/2025 1052 by Angi Hi, RN  Outcome: Progressing  Flowsheets (Taken 1/20/2025 0900)  Discharge to home or other facility with appropriate resources: Identify barriers to discharge with patient and caregiver       Problem: Safety - Adult  Goal: Free from fall injury  1/20/2025 1052 by Angi Hi, RN  Outcome: Progressing

## 2025-01-20 NOTE — DISCHARGE INSTRUCTIONS
Please complete the Modified Barium Swallow Study after discharge (at [facility]). You can schedule this by calling Central Scheduling at [phone]

## 2025-01-20 NOTE — PLAN OF CARE
Problem: Discharge Planning  Goal: Discharge to home or other facility with appropriate resources  1/20/2025 1508 by Angi Hi RN  Outcome: Adequate for Discharge  1/20/2025 1052 by Angi Hi RN  Outcome: Progressing  Flowsheets (Taken 1/20/2025 0900)  Discharge to home or other facility with appropriate resources: Identify barriers to discharge with patient and caregiver  1/20/2025 0118 by Bernard Madison RN  Outcome: Progressing     Problem: Pain  Goal: Verbalizes/displays adequate comfort level or baseline comfort level  1/20/2025 1508 by Angi Hi RN  Outcome: Adequate for Discharge  1/20/2025 0118 by Bernard Madison RN  Outcome: Progressing  Flowsheets (Taken 1/19/2025 2200)  Verbalizes/displays adequate comfort level or baseline comfort level: Encourage patient to monitor pain and request assistance     Problem: Safety - Adult  Goal: Free from fall injury  1/20/2025 1508 by Angi Hi RN  Outcome: Adequate for Discharge  1/20/2025 1052 by Angi Hi RN  Outcome: Progressing  1/20/2025 0118 by Bernard Madison RN  Outcome: Progressing     Problem: ABCDS Injury Assessment  Goal: Absence of physical injury  1/20/2025 1508 by Angi Hi RN  Outcome: Adequate for Discharge  1/20/2025 0118 by Bernard Madison RN  Outcome: Progressing     Problem: Skin/Tissue Integrity  Goal: Absence of new skin breakdown  Description: 1.  Monitor for areas of redness and/or skin breakdown  2.  Assess vascular access sites hourly  3.  Every 4-6 hours minimum:  Change oxygen saturation probe site  4.  Every 4-6 hours:  If on nasal continuous positive airway pressure, respiratory therapy assess nares and determine need for appliance change or resting period.  1/20/2025 1508 by Angi Hi RN  Outcome: Adequate for Discharge  1/20/2025 0118 by Bernard Madison RN  Outcome: Progressing     Problem: Neurosensory - Adult  Goal: Achieves maximal functionality and self care  1/20/2025 1508 by Sussy

## 2025-01-20 NOTE — DISCHARGE INSTR - DIET

## 2025-01-21 ENCOUNTER — CLINICAL DOCUMENTATION (OUTPATIENT)
Dept: INFECTIOUS DISEASES | Age: 75
End: 2025-01-21

## 2025-01-21 ENCOUNTER — TELEPHONE (OUTPATIENT)
Dept: INFECTIOUS DISEASES | Age: 75
End: 2025-01-21

## 2025-01-21 ENCOUNTER — ENROLLMENT (OUTPATIENT)
Dept: INFECTIOUS DISEASES | Age: 75
End: 2025-01-21

## 2025-01-21 DIAGNOSIS — B95.1 BACTEREMIA DUE TO GROUP B STREPTOCOCCUS: Primary | ICD-10-CM

## 2025-01-21 DIAGNOSIS — R78.81 BACTEREMIA DUE TO GROUP B STREPTOCOCCUS: Primary | ICD-10-CM

## 2025-01-21 NOTE — TELEPHONE ENCOUNTER
Called [facility] and spoke with Eliza. Verified IV abx and lab orders as follows.     Daptomycin 450mg IV q24 through 2/24/25  Labs: CBC w diff, creatinine, LFTs, CRP, CK every Mon  Call with any change in status, transfer in or out of a facility or to hospital   Fax and office number verified.    Eliza verified orders and verbalized understanding.

## 2025-01-21 NOTE — PROGRESS NOTES
Dr. Tyson has placed a referral order for pharmacist to manage Outpatient Parental Antimicrobial Therapy (OPAT) pursuant the ID Collaborative Practice Agreement.     Pertinent PMH and HPI:  PMH htn, L femur fracture, etoh misuse, oral/mandibular/tongue CA with dysphagia, lung CA, memory impairment, rhabdo and rib fracture  fall    Presents  with fall, hip pain, shoulder pain, back pain, fatigue       Pertinent Objective Data:    Wt Readings from Last 1 Encounters:   25 74.5 kg (164 lb 4.8 oz)      BMI Readings from Last 1 Encounters:   25 24.25 kg/m²      SCr= 0.7    Lab Results   Component Value Date    .0 (H) 2025       Lab Results   Component Value Date    CKTOTAL 106 2025       Imagin/11 CXRAY, hip XRAY, shoulder XRAY all unremarkable     CT head, CTAP unremarkable    1/15 CT chest limited due to motion but mild airspace disease     TTE:    Limited study to evaluate for vegetations.    Left Ventricle: Normal left ventricular systolic function with a visually estimated EF of 55 - 60%. Normal wall motion.    Mitral Valve: Mild annular calcification at the anterior and posterior leaflets.    Right Atrium: Echogenic density noted in the right atria only in the parasternal short axis view, likely represents a calcified prominent Eustachian valve, however, vegetation cannot be excluded, suggest TONY to further assess.    Pericardium: Trivial pericardial effusion present.    Image quality is technically difficult. Procedure performed with the patient in a supine position.       Micro:    blood: GBS   blood: NGTD    OPAT Orders:  Diagnosis  GBS bacteremia potentially due to oral source or skin/SCJ    Suspected L SCJ involvement    Antimicrobial Regimen and Projected Term Date IV dapto 450 mg daily-   Had documented delayed hypersensitivity rash to ceftriaxone   6 mg/kg based on actual     (6 week)   Weekly Lab Monitoring CBCwDiff, CRP, CK, and LFTs, SCr

## 2025-01-23 ENCOUNTER — TELEPHONE (OUTPATIENT)
Dept: INFECTIOUS DISEASES | Age: 75
End: 2025-01-23

## 2025-01-23 NOTE — TELEPHONE ENCOUNTER
OPAT Nurse Coordinator Weekly Update Note    Current OPAT plan: Daptomycin 450mg IV q24 through 2/24/25      Diagnosis: streptococcal bacteremia and septic arthritis L SCJ       Assessment: Called [facility] and spoke with patient's nurse. Nurse stated patient was doing well with no issues. She denied fever or s/s of infection.  I informed her of need for follow up with Dr Ivan with ENT @  and scheduled follow up with us. I also informed her of need for repeat TTE but would call once order was placed to give scheduling number. She verbalized understanding.       Follow up appts: ID follow up 2/11

## 2025-01-24 DIAGNOSIS — R78.81 BACTEREMIA DUE TO GROUP B STREPTOCOCCUS: Primary | ICD-10-CM

## 2025-01-24 DIAGNOSIS — B95.1 BACTEREMIA DUE TO GROUP B STREPTOCOCCUS: Primary | ICD-10-CM

## 2025-01-27 ENCOUNTER — TELEPHONE (OUTPATIENT)
Dept: INFECTIOUS DISEASES | Age: 75
End: 2025-01-27

## 2025-01-27 LAB
BASOPHILS ABSOLUTE: 0.1 /ΜL
BASOPHILS RELATIVE PERCENT: 1 %
CREAT SERPL-MCNC: 0.6 MG/DL
EOSINOPHILS ABSOLUTE: 0.2 /ΜL
EOSINOPHILS RELATIVE PERCENT: 2.1 %
HCT VFR BLD CALC: 35.4 % (ref 41–53)
HEMOGLOBIN: 12.5 G/DL (ref 13.5–17.5)
LYMPHOCYTES ABSOLUTE: 1.7 /ΜL
LYMPHOCYTES RELATIVE PERCENT: 21.9 %
MCH RBC QN AUTO: 32.4 PG
MCHC RBC AUTO-ENTMCNC: 35.4 G/DL
MCV RBC AUTO: 91.5 FL
MONOCYTES ABSOLUTE: 0.6 /ΜL
MONOCYTES RELATIVE PERCENT: 7.3 %
NEUTROPHILS ABSOLUTE: 5.4 /ΜL
NEUTROPHILS RELATIVE PERCENT: 67.7 %
PDW BLD-RTO: 13.3 %
PLATELET # BLD: 524 K/ΜL
PMV BLD AUTO: 7.1 FL
RBC # BLD: 3.87 10^6/ΜL
WBC # BLD: 7.9 10^3/ML

## 2025-01-27 NOTE — TELEPHONE ENCOUNTER
OPAT Nurse Coordinator Weekly Update Note    Current OPAT plan: Daptomycin 450mg IV q24 through 2/24/25      Diagnosis: streptococcal bacteremia and septic arthritis L SCJ       Assessment: Called [facility] and spoke to VINCENT Loaiza. Fadia reports patient doing ok. She states vitals have been stable and denies any fevers. She did report that patient is taking oxycodone \"pretty much around the clock.\"  I asked about the site of the L SCJ and she states that the site looks normal with no signs of infection and no redness or swelling noted.       Follow up appts: ID follow up 2/11.  Informed ADON of need for TTE prior to end date of 2/24 and need for follow up with Dr Moncho DAVILA sometime soon.  She verbalized understanding.

## 2025-02-05 ENCOUNTER — TELEPHONE (OUTPATIENT)
Dept: INFECTIOUS DISEASES | Age: 75
End: 2025-02-05

## 2025-02-05 NOTE — TELEPHONE ENCOUNTER
OPAT Nurse Coordinator Weekly Update Note    Current OPAT plan: Daptomycin 450mg IV q24 through 2/24/25      Diagnosis:  streptococcal bacteremia and septic arthritis L SCJ       Assessment: Called [facility] and spoke with Yuki. I asked about how patient was doing but she did not seem to know other than vitals were stable and no fevers noted. I asked about the site of septic arthritis at the left SCJ and she did not know what I was talking about. When I explained the importance of it, she stated she didn't notice anything.     I asked about patient's labs from this week. Yuki stated he did not have any and all she had was from last week.. I requested last week lab results be sent to us and to get this weeks labs ASAP. I verified our fax number with her.     I attempted to call patient and sister but no answer or both and had to leave a  for return call.      Follow up appts: echo 2/11, ID follow up 2/11

## 2025-02-06 DIAGNOSIS — B95.1 BACTEREMIA DUE TO GROUP B STREPTOCOCCUS: ICD-10-CM

## 2025-02-06 DIAGNOSIS — R78.81 BACTEREMIA DUE TO GROUP B STREPTOCOCCUS: ICD-10-CM

## 2025-02-06 NOTE — TELEPHONE ENCOUNTER
Called [facility] and was able to speak with CLARENCE Stratton. I informed Chayito of the issues with labs and informed her of our process of verifying our orders.  Chayito states that she is going to put labs in for tomorrow and scheduled them for every Monday until we say otherwise. She also reports that they have multiple Rns at the facility who are able to draw labs off of patients line. The following labs should be drawn tomorrow and every Monday.     CBC w/diff, creatinine, LFTs, CRP & CK    I will follow up tomorrow afternoon to see if I can obtain lab results.

## 2025-02-06 NOTE — TELEPHONE ENCOUNTER
Called [facility] and spoke to nurse Eliza. I asked Eliza for labs on patient. Eliza stated that patient had no labs. I stated that I had spoken with someone yesterday and requested labs to be drawn ASAP.     I reached out to [facility] and inquired about results and futures draw.  They state they only have a result from 1/27.     Results received. Not all labs that were order collected. [facility] states that the patient hasn't had anymore labs drawn and has no futures orders, despite me calling facility 2 days in a row asking for labs to be drawn ASAP.

## 2025-02-07 NOTE — TELEPHONE ENCOUNTER
Spoke with CLARENCE Stratton to follow up on labs. She reports that they put orders in for labs as STAT for today but the lab doesn't allow STATs on certain labs.  She said she does have patient in for labs on Monday.

## 2025-02-10 LAB
A/G RATIO: 1.1
ALBUMIN: 3 G/DL
ALP BLD-CCNC: 90 U/L
ALT SERPL-CCNC: 27 U/L
AST SERPL-CCNC: 28 U/L
BASOPHILS ABSOLUTE: ABNORMAL
BASOPHILS RELATIVE PERCENT: 0.3 %
BILIRUB SERPL-MCNC: 0.7 MG/DL (ref 0.1–1.4)
BILIRUBIN DIRECT: 0.2 MG/DL
BILIRUBIN, INDIRECT: ABNORMAL
C-REACTIVE PROTEIN: 272.4
CREAT SERPL-MCNC: 0.5 MG/DL
EOSINOPHILS ABSOLUTE: 1 /ΜL
EOSINOPHILS RELATIVE PERCENT: 7 %
GLOBULIN: ABNORMAL
HCT VFR BLD CALC: 36.4 % (ref 41–53)
HEMOGLOBIN: 12 G/DL (ref 13.5–17.5)
LYMPHOCYTES ABSOLUTE: 1.7 /ΜL
LYMPHOCYTES RELATIVE PERCENT: 12.3 %
MCH RBC QN AUTO: 30.8 PG
MCHC RBC AUTO-ENTMCNC: 33 G/DL
MCV RBC AUTO: 93.2 FL
MONOCYTES ABSOLUTE: 1.2 /ΜL
MONOCYTES RELATIVE PERCENT: 8.4 %
NEUTROPHILS ABSOLUTE: 10.1 /ΜL
NEUTROPHILS RELATIVE PERCENT: 72 %
PDW BLD-RTO: 13.7 %
PLATELET # BLD: 316 K/ΜL
PMV BLD AUTO: 7.6 FL
RBC # BLD: 3.9 10^6/ΜL
TOTAL CK: 43 U/L
TOTAL PROTEIN: 5.8 G/DL (ref 6.4–8.2)
WBC # BLD: 14 10^3/ML

## 2025-02-11 ENCOUNTER — HOSPITAL ENCOUNTER (OUTPATIENT)
Dept: CARDIOLOGY | Age: 75
Discharge: HOME | End: 2025-02-13
Attending: INTERNAL MEDICINE
Payer: MEDICARE

## 2025-02-11 ENCOUNTER — OFFICE VISIT (OUTPATIENT)
Dept: INFECTIOUS DISEASES | Age: 75
End: 2025-02-11
Payer: MEDICARE

## 2025-02-11 VITALS
OXYGEN SATURATION: 92 % | HEART RATE: 84 BPM | TEMPERATURE: 97.8 F | DIASTOLIC BLOOD PRESSURE: 64 MMHG | SYSTOLIC BLOOD PRESSURE: 100 MMHG

## 2025-02-11 VITALS — HEIGHT: 69 IN | BODY MASS INDEX: 24.29 KG/M2 | WEIGHT: 164 LBS

## 2025-02-11 DIAGNOSIS — B95.1 BACTEREMIA DUE TO GROUP B STREPTOCOCCUS: ICD-10-CM

## 2025-02-11 DIAGNOSIS — Z85.819 HISTORY OF ORAL CANCER: ICD-10-CM

## 2025-02-11 DIAGNOSIS — R78.81 BACTEREMIA DUE TO GROUP B STREPTOCOCCUS: Primary | ICD-10-CM

## 2025-02-11 DIAGNOSIS — B95.1 BACTEREMIA DUE TO GROUP B STREPTOCOCCUS: Primary | ICD-10-CM

## 2025-02-11 DIAGNOSIS — R78.81 BACTEREMIA DUE TO GROUP B STREPTOCOCCUS: ICD-10-CM

## 2025-02-11 DIAGNOSIS — M00.20: ICD-10-CM

## 2025-02-11 LAB
ECHO AO ASC DIAM: 3 CM
ECHO AO ASCENDING AORTA INDEX: 1.58 CM/M2
ECHO AO ROOT DIAM: 3.7 CM
ECHO AO ROOT INDEX: 1.95 CM/M2
ECHO AV AREA PEAK VELOCITY: 3.2 CM2
ECHO AV AREA VTI: 4.2 CM2
ECHO AV AREA/BSA PEAK VELOCITY: 1.7 CM2/M2
ECHO AV AREA/BSA VTI: 2.2 CM2/M2
ECHO AV MEAN GRADIENT: 1 MMHG
ECHO AV MEAN VELOCITY: 0.6 M/S
ECHO AV PEAK GRADIENT: 3 MMHG
ECHO AV PEAK VELOCITY: 0.8 M/S
ECHO AV VELOCITY RATIO: 0.75
ECHO AV VTI: 9.6 CM
ECHO BSA: 1.9 M2
ECHO EST RA PRESSURE: 3 MMHG
ECHO LA AREA 2C: 13.7 CM2
ECHO LA AREA 4C: 13.6 CM2
ECHO LA MAJOR AXIS: 4.6 CM
ECHO LA MINOR AXIS: 5 CM
ECHO LA VOL BP: 32 ML (ref 18–58)
ECHO LA VOL MOD A2C: 30 ML (ref 18–58)
ECHO LA VOL MOD A4C: 32 ML (ref 18–58)
ECHO LA VOL/BSA BIPLANE: 17 ML/M2 (ref 16–34)
ECHO LA VOLUME INDEX MOD A2C: 16 ML/M2 (ref 16–34)
ECHO LA VOLUME INDEX MOD A4C: 17 ML/M2 (ref 16–34)
ECHO LV E' LATERAL VELOCITY: 10.6 CM/S
ECHO LV E' SEPTAL VELOCITY: 7.62 CM/S
ECHO LV EDV A2C: 43 ML
ECHO LV EDV A4C: 60 ML
ECHO LV EDV INDEX A4C: 32 ML/M2
ECHO LV EDV NDEX A2C: 23 ML/M2
ECHO LV EF PHYSICIAN: 28 %
ECHO LV EJECTION FRACTION A2C: 30 %
ECHO LV EJECTION FRACTION A4C: 23 %
ECHO LV EJECTION FRACTION BIPLANE: 27 % (ref 55–100)
ECHO LV ESV A2C: 30 ML
ECHO LV ESV A4C: 46 ML
ECHO LV ESV INDEX A2C: 16 ML/M2
ECHO LV ESV INDEX A4C: 24 ML/M2
ECHO LV INTERNAL DIMENSION DIASTOLE INDEX: 1.68 CM/M2
ECHO LV INTERNAL DIMENSION DIASTOLIC: 3.2 CM (ref 4.2–5.9)
ECHO LV ISOVOLUMETRIC RELAXATION TIME (IVRT): 90 MS
ECHO LV IVSD: 0.8 CM (ref 0.6–1)
ECHO LV MASS 2D: 65.3 G (ref 88–224)
ECHO LV MASS INDEX 2D: 34.4 G/M2 (ref 49–115)
ECHO LV POSTERIOR WALL DIASTOLIC: 0.8 CM (ref 0.6–1)
ECHO LV RELATIVE WALL THICKNESS RATIO: 0.5
ECHO LVOT AREA: 4.2 CM2
ECHO LVOT AV VTI INDEX: 1
ECHO LVOT DIAM: 2.3 CM
ECHO LVOT MEAN GRADIENT: 1 MMHG
ECHO LVOT PEAK GRADIENT: 2 MMHG
ECHO LVOT PEAK VELOCITY: 0.6 M/S
ECHO LVOT STROKE VOLUME INDEX: 21 ML/M2
ECHO LVOT SV: 39.9 ML
ECHO LVOT VTI: 9.6 CM
ECHO MV A VELOCITY: 0.94 M/S
ECHO MV AREA VTI: 3.2 CM2
ECHO MV E DECELERATION TIME (DT): 114 MS
ECHO MV E VELOCITY: 0.5 M/S
ECHO MV E/A RATIO: 0.53
ECHO MV E/E' LATERAL: 4.72
ECHO MV E/E' RATIO (AVERAGED): 5.64
ECHO MV E/E' SEPTAL: 6.56
ECHO MV LVOT VTI INDEX: 1.29
ECHO MV MAX VELOCITY: 1 M/S
ECHO MV MEAN GRADIENT: 2 MMHG
ECHO MV MEAN VELOCITY: 0.6 M/S
ECHO MV PEAK GRADIENT: 4 MMHG
ECHO MV VTI: 12.4 CM
ECHO RV BASAL DIMENSION: 2.2 CM
ECHO RV FREE WALL PEAK S': 9 CM/S
ECHO RV LONGITUDINAL DIMENSION: 6.5 CM
ECHO RV MID DIMENSION: 2 CM
ECHO RV TAPSE: 1.3 CM (ref 1.7–?)

## 2025-02-11 PROCEDURE — 3017F COLORECTAL CA SCREEN DOC REV: CPT | Performed by: INTERNAL MEDICINE

## 2025-02-11 PROCEDURE — 1036F TOBACCO NON-USER: CPT | Performed by: INTERNAL MEDICINE

## 2025-02-11 PROCEDURE — 93306 TTE W/DOPPLER COMPLETE: CPT

## 2025-02-11 PROCEDURE — 6360000004 HC RX CONTRAST MEDICATION: Performed by: INTERNAL MEDICINE

## 2025-02-11 PROCEDURE — 3078F DIAST BP <80 MM HG: CPT | Performed by: INTERNAL MEDICINE

## 2025-02-11 PROCEDURE — 99214 OFFICE O/P EST MOD 30 MIN: CPT | Performed by: INTERNAL MEDICINE

## 2025-02-11 PROCEDURE — G8427 DOCREV CUR MEDS BY ELIG CLIN: HCPCS | Performed by: INTERNAL MEDICINE

## 2025-02-11 PROCEDURE — 3074F SYST BP LT 130 MM HG: CPT | Performed by: INTERNAL MEDICINE

## 2025-02-11 PROCEDURE — 1159F MED LIST DOCD IN RCRD: CPT | Performed by: INTERNAL MEDICINE

## 2025-02-11 PROCEDURE — G8420 CALC BMI NORM PARAMETERS: HCPCS | Performed by: INTERNAL MEDICINE

## 2025-02-11 PROCEDURE — 1111F DSCHRG MED/CURRENT MED MERGE: CPT | Performed by: INTERNAL MEDICINE

## 2025-02-11 PROCEDURE — 1123F ACP DISCUSS/DSCN MKR DOCD: CPT | Performed by: INTERNAL MEDICINE

## 2025-02-11 RX ORDER — CHOLECALCIFEROL (VITAMIN D3) 1250 MCG
1 CAPSULE ORAL DAILY
COMMUNITY

## 2025-02-11 RX ORDER — OXYCODONE HYDROCHLORIDE 5 MG/1
5 TABLET ORAL EVERY 6 HOURS PRN
COMMUNITY

## 2025-02-11 RX ORDER — FERROUS SULFATE 325(65) MG
325 TABLET ORAL
COMMUNITY

## 2025-02-11 RX ADMIN — SULFUR HEXAFLUORIDE 2 ML: KIT at 14:10

## 2025-02-11 NOTE — PROGRESS NOTES
SIRS (systemic inflammatory response syndrome) ([facility]) 01/15/2025    Leukocytosis 01/11/2025    Hypoxia 10/16/2023    Dyspnea and respiratory abnormalities 10/16/2023    Acute bronchitis 10/16/2023    Hypertension 10/16/2023    Acute respiratory failure 10/10/2023    Closed fracture of multiple ribs of right side 05/27/2023    Rhabdomyolysis 05/26/2023    New persistent daily headache      NPH (normal pressure hydrocephalus) ([facility])      HTN (hypertension), benign      Headache 07/21/2021    Intractable nausea and vomiting 07/21/2021    History of oral cancer 07/21/2021    Dysphagia 07/21/2021    Moderate protein-calorie malnutrition ([facility]) 11/01/2017    Nondisplaced unspecified condyle fracture of lower end of left femur, initial encounter for closed fracture ([facility]) 10/31/2017    Alcohol abuse 10/31/2017    Lactic acidosis 10/31/2017    Hyponatremia 10/31/2017         Background of tongue cancer s/p resection  Oseoradionecrosis of mandible  Appears to be lost to ENT follow-up      Admitted 1/11/25 with acute febrile illness, weakness, L shoulder pain  Remains unclear if the shoulder was the heralding symptom or if pain there presented after the weakness and fall      GpB strep bacteremia, present in both sets of BC collected on admission   TTE was reassuring   He is completing extended course of abx for suspected osteoarticular infection of SCJ     Suspected infection of L SCJ - clinically resolved  Marked elevation of CRP - wait updated labs      Drug hypersensitivity rash from rocephin - resolved  Re-challenge w caution        No abx allergies       Plan     Continue dapto as ordered  Will look for updated labs including CK   Update CRP     The SCJ area is now normal, local inflammation has resolved     Encourage f/u with ENT       All questions addressed    RTC TBD         BENJAMIN HOUSTON MD

## 2025-02-17 LAB
A/G RATIO: 1.2
ALBUMIN: 2.7 G/DL
ALP BLD-CCNC: 100 U/L
ALT SERPL-CCNC: 43 U/L
AST SERPL-CCNC: 32 U/L
BASOPHILS ABSOLUTE: 0.1 /ΜL
BASOPHILS RELATIVE PERCENT: 1 %
BILIRUB SERPL-MCNC: 0.7 MG/DL (ref 0.1–1.4)
BILIRUBIN DIRECT: 0.3 MG/DL
BILIRUBIN, INDIRECT: ABNORMAL
C-REACTIVE PROTEIN: 214.6
CREAT SERPL-MCNC: 0.5 MG/DL
EOSINOPHILS ABSOLUTE: 1.4 /ΜL
EOSINOPHILS RELATIVE PERCENT: 13.9 %
GLOBULIN: ABNORMAL
HCT VFR BLD CALC: 30.6 % (ref 41–53)
HEMOGLOBIN: 10.3 G/DL (ref 13.5–17.5)
LYMPHOCYTES ABSOLUTE: 1 /ΜL
LYMPHOCYTES RELATIVE PERCENT: 10.4 %
MCH RBC QN AUTO: 30.2 PG
MCHC RBC AUTO-ENTMCNC: 33.8 G/DL
MCV RBC AUTO: 89.3 FL
MONOCYTES ABSOLUTE: 0.6 /ΜL
MONOCYTES RELATIVE PERCENT: 5.7 %
NEUTROPHILS ABSOLUTE: 6.7 /ΜL
NEUTROPHILS RELATIVE PERCENT: 69 %
PDW BLD-RTO: 14.1 %
PLATELET # BLD: 417 K/ΜL
PMV BLD AUTO: 7.2 FL
RBC # BLD: 3.42 10^6/ΜL
TOTAL CK: 19 U/L
TOTAL PROTEIN: 5 G/DL (ref 6.4–8.2)
WBC # BLD: 9.8 10^3/ML

## 2025-02-20 ENCOUNTER — TELEPHONE (OUTPATIENT)
Dept: INFECTIOUS DISEASES | Age: 75
End: 2025-02-20

## 2025-02-20 DIAGNOSIS — R78.81 BACTEREMIA DUE TO GROUP B STREPTOCOCCUS: ICD-10-CM

## 2025-02-20 DIAGNOSIS — B95.1 BACTEREMIA DUE TO GROUP B STREPTOCOCCUS: ICD-10-CM

## 2025-02-20 NOTE — TELEPHONE ENCOUNTER
Called [facility] and was unable to talk to nurse as she was passing her meds per . Gave her my name and number for a return call.    response to care/treatments:

## 2025-02-20 NOTE — TELEPHONE ENCOUNTER
OPAT Nurse Coordinator Weekly Update Note    Current OPAT plan: Daptomycin 450mg IV q24 through 2/24/25      Diagnosis: streptococcal bacteremia and septic arthritis L SCJ       Assessment: Received a call back from Smita at [facility]. She reports that the patient is doing well. She denies the patient having any fevers or s/s of infection.       Follow up appts: No follows noted at the time of call.

## 2025-02-24 ENCOUNTER — TELEPHONE (OUTPATIENT)
Dept: INFECTIOUS DISEASES | Age: 75
End: 2025-02-24

## 2025-02-24 NOTE — TELEPHONE ENCOUNTER
Sent message to Chayito LIMA and made her aware that we are ending IV abx today as planned and line can be removed after last dose. Will verify she received message.     Several attempts to call facility without answer.

## 2025-02-24 NOTE — TELEPHONE ENCOUNTER
Called [facility] and spoke with nurse Saira. Informed Saira of ending IV abx as planned today and gave order to remove line after today's dose. Saira verified orders and verbalized understanding.     Will verify line removal in 1-2 days.

## 2025-02-25 NOTE — TELEPHONE ENCOUNTER
Called [facility] and spoke with patient's nurse. Patient's nurse stated line was still in.  I informed her of order given to remove line and she stated she had to speak with the DON, then hung up.     Called Juan.  I informed her of the above conversation. She states there is a current order in for patient's line to be removed. She reports that line will be removed today.

## (undated) DEVICE — ENDO CARRY-ON PROCEDURE KIT INCLUDES SUCTION TUBING, LUBRICANT, GAUZE, BIOHAZARD STICKER, TRANSPORT PAD AND INTERCEPT BEDSIDE KIT.: Brand: ENDO CARRY-ON PROCEDURE KIT

## (undated) DEVICE — CONMED SCOPE SAVER BITE BLOCK, 20X27 MM: Brand: SCOPE SAVER

## (undated) DEVICE — FORCEPS BX L240CM JAW DIA2.8MM L CAP W/ NDL MIC MESH TOOTH